# Patient Record
Sex: FEMALE | Race: BLACK OR AFRICAN AMERICAN | Employment: FULL TIME | URBAN - METROPOLITAN AREA
[De-identification: names, ages, dates, MRNs, and addresses within clinical notes are randomized per-mention and may not be internally consistent; named-entity substitution may affect disease eponyms.]

---

## 2017-02-01 ENCOUNTER — HOSPITAL ENCOUNTER (OUTPATIENT)
Dept: LAB | Age: 32
Discharge: HOME | End: 2017-02-01
Payer: MEDICAID

## 2017-02-01 ENCOUNTER — OFFICE VISIT (OUTPATIENT)
Dept: FAMILY MEDICINE CLINIC | Age: 32
End: 2017-02-01

## 2017-02-01 VITALS
DIASTOLIC BLOOD PRESSURE: 75 MMHG | WEIGHT: 189 LBS | TEMPERATURE: 97.9 F | HEIGHT: 67 IN | RESPIRATION RATE: 18 BRPM | OXYGEN SATURATION: 99 % | BODY MASS INDEX: 29.66 KG/M2 | SYSTOLIC BLOOD PRESSURE: 120 MMHG | HEART RATE: 74 BPM

## 2017-02-01 DIAGNOSIS — F41.9 ANXIETY: Primary | ICD-10-CM

## 2017-02-01 DIAGNOSIS — K21.00 GASTROESOPHAGEAL REFLUX DISEASE WITH ESOPHAGITIS: ICD-10-CM

## 2017-02-01 DIAGNOSIS — R09.81 SINUS CONGESTION: ICD-10-CM

## 2017-02-01 DIAGNOSIS — R00.2 PALPITATIONS: ICD-10-CM

## 2017-02-01 DIAGNOSIS — Z72.0 TOBACCO ABUSE: ICD-10-CM

## 2017-02-01 LAB
ALBUMIN SERPL BCP-MCNC: 3.8 G/DL (ref 3.4–5)
ALBUMIN/GLOB SERPL: 1.2 {RATIO} (ref 0.8–1.7)
ALP SERPL-CCNC: 66 U/L (ref 45–117)
ALT SERPL-CCNC: 20 U/L (ref 13–56)
ANION GAP BLD CALC-SCNC: 7 MMOL/L (ref 3–18)
AST SERPL W P-5'-P-CCNC: 12 U/L (ref 15–37)
BASOPHILS # BLD AUTO: 0 K/UL (ref 0–0.06)
BASOPHILS # BLD: 0 % (ref 0–2)
BILIRUB SERPL-MCNC: 0.7 MG/DL (ref 0.2–1)
BUN SERPL-MCNC: 9 MG/DL (ref 7–18)
BUN/CREAT SERPL: 9 (ref 12–20)
CALCIUM SERPL-MCNC: 9.2 MG/DL (ref 8.5–10.1)
CHLORIDE SERPL-SCNC: 106 MMOL/L (ref 100–108)
CO2 SERPL-SCNC: 26 MMOL/L (ref 21–32)
CREAT SERPL-MCNC: 0.98 MG/DL (ref 0.6–1.3)
DIFFERENTIAL METHOD BLD: ABNORMAL
EOSINOPHIL # BLD: 0.1 K/UL (ref 0–0.4)
EOSINOPHIL NFR BLD: 1 % (ref 0–5)
ERYTHROCYTE [DISTWIDTH] IN BLOOD BY AUTOMATED COUNT: 15.3 % (ref 11.6–14.5)
GLOBULIN SER CALC-MCNC: 3.2 G/DL (ref 2–4)
GLUCOSE SERPL-MCNC: 80 MG/DL (ref 74–99)
HCT VFR BLD AUTO: 38.2 % (ref 35–45)
HGB BLD-MCNC: 12.5 G/DL (ref 12–16)
LYMPHOCYTES # BLD AUTO: 35 % (ref 21–52)
LYMPHOCYTES # BLD: 2.2 K/UL (ref 0.9–3.6)
MCH RBC QN AUTO: 29.6 PG (ref 24–34)
MCHC RBC AUTO-ENTMCNC: 32.7 G/DL (ref 31–37)
MCV RBC AUTO: 90.5 FL (ref 74–97)
MONOCYTES # BLD: 0.4 K/UL (ref 0.05–1.2)
MONOCYTES NFR BLD AUTO: 6 % (ref 3–10)
NEUTS SEG # BLD: 3.7 K/UL (ref 1.8–8)
NEUTS SEG NFR BLD AUTO: 58 % (ref 40–73)
PLATELET # BLD AUTO: 214 K/UL (ref 135–420)
PMV BLD AUTO: 12.3 FL (ref 9.2–11.8)
POTASSIUM SERPL-SCNC: 3.9 MMOL/L (ref 3.5–5.5)
PROT SERPL-MCNC: 7 G/DL (ref 6.4–8.2)
RBC # BLD AUTO: 4.22 M/UL (ref 4.2–5.3)
SODIUM SERPL-SCNC: 139 MMOL/L (ref 136–145)
TSH SERPL DL<=0.05 MIU/L-ACNC: 0.72 UIU/ML (ref 0.36–3.74)
WBC # BLD AUTO: 6.4 K/UL (ref 4.6–13.2)

## 2017-02-01 PROCEDURE — 84443 ASSAY THYROID STIM HORMONE: CPT | Performed by: FAMILY MEDICINE

## 2017-02-01 PROCEDURE — 85025 COMPLETE CBC W/AUTO DIFF WBC: CPT | Performed by: FAMILY MEDICINE

## 2017-02-01 PROCEDURE — 80053 COMPREHEN METABOLIC PANEL: CPT | Performed by: FAMILY MEDICINE

## 2017-02-01 RX ORDER — LORATADINE AND PSEUDOEPHEDRINE 10; 240 MG/1; MG/1
1 TABLET, EXTENDED RELEASE ORAL DAILY
Qty: 30 TAB | Refills: 0 | Status: SHIPPED | OUTPATIENT
Start: 2017-02-01 | End: 2017-07-25

## 2017-02-01 RX ORDER — BUPROPION HYDROCHLORIDE 150 MG/1
150 TABLET, EXTENDED RELEASE ORAL 2 TIMES DAILY
Qty: 60 TAB | Refills: 1 | Status: SHIPPED | OUTPATIENT
Start: 2017-02-01 | End: 2017-07-25

## 2017-02-01 NOTE — MR AVS SNAPSHOT
Visit Information Date & Time Provider Department Dept. Phone Encounter #  
 2/1/2017 11:15 AM Hesed Sallye Kocher, MD [facility] [phone] [id number] Follow-up Instructions Return in about 1 month (around 3/1/2017), or if symptoms worsen or fail to improve, for smoking cessation, anxiety, GERD. Upcoming Health Maintenance Date Due Pneumococcal 19-64 Medium Risk (1 of 1 - PPSV23) 3/19/2004 PAP AKA CERVICAL CYTOLOGY 3/19/2006 DTaP/Tdap/Td series (2 - Td) 2/1/2027 Allergies as of 2/1/2017  Review Complete On: 2/1/2017 By: Tee Fierro MD  
 No Known Allergies Current Immunizations  Never Reviewed No immunizations on file. Not reviewed this visit You Were Diagnosed With   
  
 Codes Comments Anxiety    -  Primary ICD-10-CM: F41.9 ICD-9-CM: 300.00 Palpitations     ICD-10-CM: R00.2 ICD-9-CM: 785.1 Tobacco abuse     ICD-10-CM: Z72.0 ICD-9-CM: 305.1 Sinus congestion     ICD-10-CM: R09.81 ICD-9-CM: 478.19 Gastroesophageal reflux disease with esophagitis     ICD-10-CM: K21.0 ICD-9-CM: 530.11 Vitals BP Pulse Temp Resp Height(growth percentile) Weight(growth percentile) 120/75 (BP 1 Location: Left arm, BP Patient Position: Sitting) 74 97.9 °F (36.6 °C) (Oral) 18 5' 7\" (1.702 m) 189 lb (85.7 kg) LMP SpO2 BMI Smoking Status 01/23/2017 99% 29.6 kg/m2 Current Every Day Smoker BMI and BSA Data Body Mass Index Body Surface Area  
 29.6 kg/m 2 2.01 m 2 Preferred Pharmacy Pharmacy Name [address] [place] [phone] Your Updated Medication List  
  
   
This list is accurate as of: 2/1/17 12:28 PM.  Always use your most recent med list.  
  
  
  
  
 buPROPion  mg SR tablet Commonly known as:  Shamika Heller Take 1 Tab by mouth two (2) times a day.  Indications: SMOKING CESSATION  
  
 loratadine-pseudoephedrine  mg per tablet Commonly known as:  CLARITIN-D 24 HOUR Take 1 Tab by mouth daily. Prescriptions Sent to Pharmacy Refills  
 loratadine-pseudoephedrine (CLARITIN-D 24 HOUR)  mg per tablet 0 Sig: Take 1 Tab by mouth daily. Class: Normal  
 Pharmacy: [facility] [place] Ph #: [phone] Route: Oral  
 buPROPion SR (WELLBUTRIN SR) 150 mg SR tablet 1 Sig: Take 1 Tab by mouth two (2) times a day. Indications: SMOKING CESSATION Class: Normal  
 [facility] [facility] [facility]: [phone] Route: Oral  
  
Follow-up Instructions Return in about 1 month (around 3/1/2017), or if symptoms worsen or fail to improve, for smoking cessation, anxiety, GERD. To-Do List   
 02/01/2017 Lab:  CBC WITH AUTOMATED DIFF   
  
 02/01/2017 Lab:  METABOLIC PANEL, COMPREHENSIVE   
  
 02/01/2017 Lab:  TSH 3RD GENERATION Patient Instructions Anxiety Disorder: Care Instructions Your Care Instructions Anxiety is a normal reaction to stress. Difficult situations can cause you to have symptoms such as sweaty palms and a nervous feeling. In an anxiety disorder, the symptoms are far more severe. Constant worry, muscle tension, trouble sleeping, nausea and diarrhea, and other symptoms can make normal daily activities difficult or impossible. These symptoms may occur for no reason, and they can affect your work, school, or social life. Medicines, counseling, and self-care can all help. Follow-up care is a key part of your treatment and safety. Be sure to make and go to all appointments, and call your doctor if you are having problems. It's also a good idea to know your test results and keep a list of the medicines you take. How can you care for yourself at home? · Take medicines exactly as directed.  Call your doctor if you think you are having a problem with your medicine. · Go to your counseling sessions and follow-up appointments. · Recognize and accept your anxiety. Then, when you are in a situation that makes you anxious, say to yourself, \"This is not an emergency. I feel uncomfortable, but I am not in danger. I can keep going even if I feel anxious. \" · Be kind to your body: ¨ Relieve tension with exercise or a massage. ¨ Get enough rest. 
¨ Avoid alcohol, caffeine, nicotine, and illegal drugs. They can increase your anxiety level and cause sleep problems. ¨ Learn and do relaxation techniques. See below for more about these techniques. · Engage your mind. Get out and do something you enjoy. Go to a funny movie, or take a walk or hike. Plan your day. Having too much or too little to do can make you anxious. · Keep a record of your symptoms. Discuss your fears with a good friend or family member, or join a support group for people with similar problems. Talking to others sometimes relieves stress. · Get involved in social groups, or volunteer to help others. Being alone sometimes makes things seem worse than they are. · Get at least 30 minutes of exercise on most days of the week to relieve stress. Walking is a good choice. You also may want to do other activities, such as running, swimming, cycling, or playing tennis or team sports. Relaxation techniques Do relaxation exercises 10 to 20 minutes a day. You can play soothing, relaxing music while you do them, if you wish. · Tell others in your house that you are going to do your relaxation exercises. Ask them not to disturb you. · Find a comfortable place, away from all distractions and noise. · Lie down on your back, or sit with your back straight. · Focus on your breathing. Make it slow and steady. · Breathe in through your nose. Breathe out through either your nose or mouth.  
· Breathe deeply, filling up the area between your navel and your rib cage. Breathe so that your belly goes up and down. · Do not hold your breath. · Breathe like this for 5 to 10 minutes. Notice the feeling of calmness throughout your whole body. As you continue to breathe slowly and deeply, relax by doing the following for another 5 to 10 minutes: · Tighten and relax each muscle group in your body. You can begin at your toes and work your way up to your head. · Imagine your muscle groups relaxing and becoming heavy. · Empty your mind of all thoughts. · Let yourself relax more and more deeply. · Become aware of the state of calmness that surrounds you. · When your relaxation time is over, you can bring yourself back to alertness by moving your fingers and toes and then your hands and feet and then stretching and moving your entire body. Sometimes people fall asleep during relaxation, but they usually wake up shortly afterward. · Always give yourself time to return to full alertness before you drive a car or do anything that might cause an accident if you are not fully alert. Never play a relaxation tape while you drive a car. When should you call for help? Call 911 anytime you think you may need emergency care. For example, call if: 
· You feel you cannot stop from hurting yourself or someone else. Keep the numbers for these national suicide hotlines: 5-285-209-TALK (5-238.941.9499) and 0-430-NOFATCY (5-917.201.2224). If you or someone you know talks about suicide or feeling hopeless, get help right away. Watch closely for changes in your health, and be sure to contact your doctor if: 
· You have anxiety or fear that affects your life. · You have symptoms of anxiety that are new or different from those you had before. Where can you learn more? Go to http://kvng-carla.info/. Enter P754 in the search box to learn more about \"Anxiety Disorder: Care Instructions. \" Current as of: July 26, 2016 Content Version: 11.1 © 7933-2195 [employer]. Care instructions adapted under license by [employer] (which disclaims liability or warranty for this information). If you have questions about a medical condition or this instruction, always ask your healthcare professional. [address] any warranty or liability for your use of this information. Gastroesophageal Reflux Disease (GERD): Care Instructions Your Care Instructions Gastroesophageal reflux disease (GERD) is the backward flow of stomach acid into the esophagus. The esophagus is the tube that leads from your throat to your stomach. A one-way valve prevents the stomach acid from moving up into this tube. When you have GERD, this valve does not close tightly enough. If you have mild GERD symptoms including heartburn, you may be able to control the problem with antacids or over-the-counter medicine. Changing your diet, losing weight, and making other lifestyle changes can also help reduce symptoms. Follow-up care is a key part of your treatment and safety. Be sure to make and go to all appointments, and call your doctor if you are having problems. Its also a good idea to know your test results and keep a list of the medicines you take. How can you care for yourself at home? · Take your medicines exactly as prescribed. Call your doctor if you think you are having a problem with your medicine. · Your doctor may recommend over-the-counter medicine. For mild or occasional indigestion, antacids, such as Tums, Gaviscon, Mylanta, or Maalox, may help. Your doctor also may recommend over-the-counter acid reducers, such as Pepcid AC, Tagamet HB, Zantac 75, or Prilosec. Read and follow all instructions on the label. If you use these medicines often, talk with your doctor. · Change your eating habits. ¨ Its best to eat several small meals instead of two or three large meals. ¨ After you eat, wait 2 to 3 hours before you lie down. ¨ Chocolate, mint, and alcohol can make GERD worse. ¨ Spicy foods, foods that have a lot of acid (like tomatoes and oranges), and coffee can make GERD symptoms worse in some people. If your symptoms are worse after you eat a certain food, you may want to stop eating that food to see if your symptoms get better. · Do not smoke or chew tobacco. Smoking can make GERD worse. If you need help quitting, talk to your doctor about stop-smoking programs and medicines. These can increase your chances of quitting for good. · If you have GERD symptoms at night, raise the head of your bed 6 to 8 inches by putting the frame on blocks or placing a foam wedge under the head of your mattress. (Adding extra pillows does not work.) · Do not wear tight clothing around your middle. · Lose weight if you need to. Losing just 5 to 10 pounds can help. When should you call for help? Call your doctor now or seek immediate medical care if: 
· You have new or different belly pain. · Your stools are black and tarlike or have streaks of blood. Watch closely for changes in your health, and be sure to contact your doctor if: 
· Your symptoms have not improved after 2 days. · Food seems to catch in your throat or chest. 
Where can you learn more? Go to http://kvng-carla.info/. Enter C893 in the search box to learn more about \"Gastroesophageal Reflux Disease (GERD): Care Instructions. \" Current as of: August 9, 2016 Content Version: 11.1 © 4039-8880 [employer]. Care instructions adapted under license by [employer] (which disclaims liability or warranty for this information). If you have questions about a medical condition or this instruction, always ask your healthcare professional. [place] [address] any warranty or liability for your use of this information. Stopping Smokeless Tobacco Use: Care Instructions Your Care Instructions Smokeless tobacco comes in many forms, such as snuff and chewing tobacco: · Snuff is finely ground tobacco sold in cans or pouches. Most of the time, snuff is used by putting a \"pinch\" or \"dip\" between the lower lip or cheek and the gum. · Chewing tobacco is sold as loose leaves, plugs, or twists. It is chewed or placed between the cheek and the gum or teeth. There are plenty of reasons to stop using smokeless tobacco. These products are harmful. They are not risk-free alternatives to smoking. Smokeless tobacco contains nicotine, which is addicting. Though using smokeless tobacco is less harmful than smoking cigarettes, it can cause serious health problems, such as: 
· White patches or red sores in your mouth that can turn into mouth cancer involving the lip, tongue, or cheek. · Tooth loss and other dental problems. · Gum disease. Your gums may pull away from your teeth and not grow back. People who use smokeless tobacco crave the nicotine in it. Giving up smokeless tobacco is much harder than simply changing a habit. Your body has to stop craving the nicotine. It is hard to quit, but you can do it. Many tools are available for people who want to quit using smokeless tobacco. You may find that combining tools works best for you. There are several steps to quitting. First you get ready to quit. Then you get support to help you. After that, you learn new skills and behaviors to quit. For many people, a necessary step is getting and using medicine. Your doctor will help you set up the plan that best meets your needs. You may want to attend a tobacco cessation program. When you choose a program, look for one that has proven success. Ask your doctor for ideas.  You will greatly increase your chances of success if you take medicine as well as get counseling or join a cessation program. 
Some of the changes you feel when you first quit smokeless tobacco are uncomfortable. Your body will miss the nicotine at first, and you may feel short-tempered and grumpy. You may have trouble sleeping or concentrating. Medicine can help you deal with these symptoms. You may struggle with changing your habits and rituals. The last step is the tricky one: Be prepared for the urge to use smokeless tobacco to continue for a time. This is a lot to deal with, but keep at it. You will feel better. Follow-up care is a key part of your treatment and safety. Be sure to make and go to all appointments, and call your doctor if you are having problems. It's also a good idea to know your test results and keep a list of the medicines you take. How can you care for yourself at home? · Ask your family, friends, and coworkers for support. You have a better chance of quitting if you have help and support. · Join a support group for people who are trying to quit using smokeless tobacco. 
· Set a quit date. Pick your date carefully so that it is not right in the middle of a big deadline or stressful time. After you quit, do not use smokeless tobacco even once. Get rid of all spit cups, cans, and pouches after your last use. Clean your house and your clothes so that they do not smell of tobacco. 
· Learn how to be a non-user. Think about ways you can avoid those things that make you reach for tobacco. 
¨ Learn some ways to deal with cravings, like calling a friend or going for a walk. Cravings often pass. ¨ Avoid situations that put you at greatest risk for using smokeless tobacco. For some people, it is hard to spend time with friends without dipping or chewing. For others, they might skip a coffee break with coworkers who smoke or use smokeless tobacco. 
¨ Change your daily routine. Take a different route to work, or eat a meal in a different place. · Cut down on stress. Calm yourself or release tension by doing an activity you enjoy, such as reading a book, taking a hot bath, or gardening. · Talk to your doctor or pharmacist about nicotine replacement therapy. You still get nicotine, but you do not use tobacco. Nicotine replacement products help you slowly reduce the amount of nicotine you need. Many of these products are available over the counter. They include nicotine patches, gum, lozenges, and inhalers. · Ask your doctor about bupropion (Wellbutrin) or varenicline (Chantix), which are prescription medicines. They do not contain nicotine. They help you by reducing withdrawal symptoms, such as stress and anxiety. · Get regular exercise. Having healthy habits will help your body move past its craving for nicotine. · Be prepared to keep trying. Most people are not successful the first few times they try to quit. Do not get mad at yourself if you use tobacco again. Make a list of things you learned, and think about when you want to try again, such as next week, next month, or next year. Where can you learn more? Go to http://kvng-carla.info/. Enter C003 in the search box to learn more about \"Stopping Smokeless Tobacco Use: Care Instructions. \" Current as of: May 26, 2016 Content Version: 11.1 © 7746-9671 [employer]. Care instructions adapted under license by [employer] (which disclaims liability or warranty for this information). If you have questions about a medical condition or this instruction, always ask your healthcare professional. [address] any warranty or liability for your use of this information. Introducing [facility]! [facility] introduces [employer] patient portal. Now you can access parts of your medical record, email your doctor's office, and request medication refills online. 1. In your internet browser, go to [email or web address]€™[email or web address] More [email or web address] Than [email or web address]€™s More Alive Than You 2. Click on the First Time User? Click Here link in the Sign In box. You will see the New Member Sign Up page. 3. Enter your [employer] Access Code exactly as it appears below. You will not need to use this code after youve completed the sign-up process. If you do not sign up before the expiration date, you must request a new code. · [employer] Access Code: [email or web address] Expires: 5/2/2017 11:38 AM 
 
4. Enter the last four digits of your Social Security Number (xxxx) and Date of Birth (mm/dd/yyyy) as indicated and click Submit. You will be taken to the next sign-up page. 5. Create a [employer] ID. This will be your [employer] login ID and cannot be changed, so think of one that is secure and easy to remember. 6. Create a [employer] password. You can change your password at any time. 7. Enter your Password Reset Question and Answer. This can be used at a later time if you forget your password. 8. Enter your e-mail address. You will receive e-mail notification when new information is available in [address]. 9. Click Sign Up. You can now view and download portions of your medical record. 10. Click the Download Summary menu link to download a portable copy of your medical information. If you have questions, please visit the Frequently Asked Questions section of the [employer] website. Remember, [employer] is NOT to be used for urgent needs. For medical emergencies, dial 911. Now available from your iPhone and Android! Please provide this summary of care documentation to your next provider. Your primary care clinician is listed as Arpan Rojo. If you have any questions after today's visit, please call [phone].

## 2017-02-01 NOTE — PATIENT INSTRUCTIONS
Anxiety Disorder: Care Instructions  Your Care Instructions  Anxiety is a normal reaction to stress. Difficult situations can cause you to have symptoms such as sweaty palms and a nervous feeling. In an anxiety disorder, the symptoms are far more severe. Constant worry, muscle tension, trouble sleeping, nausea and diarrhea, and other symptoms can make normal daily activities difficult or impossible. These symptoms may occur for no reason, and they can affect your work, school, or social life. Medicines, counseling, and self-care can all help. Follow-up care is a key part of your treatment and safety. Be sure to make and go to all appointments, and call your doctor if you are having problems. It's also a good idea to know your test results and keep a list of the medicines you take. How can you care for yourself at home? · Take medicines exactly as directed. Call your doctor if you think you are having a problem with your medicine. · Go to your counseling sessions and follow-up appointments. · Recognize and accept your anxiety. Then, when you are in a situation that makes you anxious, say to yourself, \"This is not an emergency. I feel uncomfortable, but I am not in danger. I can keep going even if I feel anxious. \"  · Be kind to your body:  ¨ Relieve tension with exercise or a massage. ¨ Get enough rest.  ¨ Avoid alcohol, caffeine, nicotine, and illegal drugs. They can increase your anxiety level and cause sleep problems. ¨ Learn and do relaxation techniques. See below for more about these techniques. · Engage your mind. Get out and do something you enjoy. Go to a funny movie, or take a walk or hike. Plan your day. Having too much or too little to do can make you anxious. · Keep a record of your symptoms. Discuss your fears with a good friend or family member, or join a support group for people with similar problems. Talking to others sometimes relieves stress.   · Get involved in social groups, or volunteer to help others. Being alone sometimes makes things seem worse than they are. · Get at least 30 minutes of exercise on most days of the week to relieve stress. Walking is a good choice. You also may want to do other activities, such as running, swimming, cycling, or playing tennis or team sports. Relaxation techniques  Do relaxation exercises 10 to 20 minutes a day. You can play soothing, relaxing music while you do them, if you wish. · Tell others in your house that you are going to do your relaxation exercises. Ask them not to disturb you. · Find a comfortable place, away from all distractions and noise. · Lie down on your back, or sit with your back straight. · Focus on your breathing. Make it slow and steady. · Breathe in through your nose. Breathe out through either your nose or mouth. · Breathe deeply, filling up the area between your navel and your rib cage. Breathe so that your belly goes up and down. · Do not hold your breath. · Breathe like this for 5 to 10 minutes. Notice the feeling of calmness throughout your whole body. As you continue to breathe slowly and deeply, relax by doing the following for another 5 to 10 minutes:  · Tighten and relax each muscle group in your body. You can begin at your toes and work your way up to your head. · Imagine your muscle groups relaxing and becoming heavy. · Empty your mind of all thoughts. · Let yourself relax more and more deeply. · Become aware of the state of calmness that surrounds you. · When your relaxation time is over, you can bring yourself back to alertness by moving your fingers and toes and then your hands and feet and then stretching and moving your entire body. Sometimes people fall asleep during relaxation, but they usually wake up shortly afterward. · Always give yourself time to return to full alertness before you drive a car or do anything that might cause an accident if you are not fully alert.  Never play a relaxation tape while you drive a car. When should you call for help? Call 911 anytime you think you may need emergency care. For example, call if:  · You feel you cannot stop from hurting yourself or someone else. Keep the numbers for these national suicide hotlines: 0-715-789-TALK (3-959.277.9968) and 8-728-ZZJQTUR (8-386.846.9185). If you or someone you know talks about suicide or feeling hopeless, get help right away. Watch closely for changes in your health, and be sure to contact your doctor if:  · You have anxiety or fear that affects your life. · You have symptoms of anxiety that are new or different from those you had before. Where can you learn more? Go to http://kvng-carla.info/. Enter P754 in the search box to learn more about \"Anxiety Disorder: Care Instructions. \"  Current as of: July 26, 2016  Content Version: 11.1  © 9137-9453 [employer]. Care instructions adapted under license by [employer] (which disclaims liability or warranty for this information). If you have questions about a medical condition or this instruction, always ask your healthcare professional. [place] [address] any warranty or liability for your use of this information. Gastroesophageal Reflux Disease (GERD): Care Instructions  Your Care Instructions    Gastroesophageal reflux disease (GERD) is the backward flow of stomach acid into the esophagus. The esophagus is the tube that leads from your throat to your stomach. A one-way valve prevents the stomach acid from moving up into this tube. When you have GERD, this valve does not close tightly enough. If you have mild GERD symptoms including heartburn, you may be able to control the problem with antacids or over-the-counter medicine. Changing your diet, losing weight, and making other lifestyle changes can also help reduce symptoms. Follow-up care is a key part of your treatment and safety.  Be sure to make and go to all appointments, and call your doctor if you are having problems. Its also a good idea to know your test results and keep a list of the medicines you take. How can you care for yourself at home? · Take your medicines exactly as prescribed. Call your doctor if you think you are having a problem with your medicine. · Your doctor may recommend over-the-counter medicine. For mild or occasional indigestion, antacids, such as Tums, Gaviscon, Mylanta, or Maalox, may help. Your doctor also may recommend over-the-counter acid reducers, such as Pepcid AC, Tagamet HB, Zantac 75, or Prilosec. Read and follow all instructions on the label. If you use these medicines often, talk with your doctor. · Change your eating habits. ¨ Its best to eat several small meals instead of two or three large meals. ¨ After you eat, wait 2 to 3 hours before you lie down. ¨ Chocolate, mint, and alcohol can make GERD worse. ¨ Spicy foods, foods that have a lot of acid (like tomatoes and oranges), and coffee can make GERD symptoms worse in some people. If your symptoms are worse after you eat a certain food, you may want to stop eating that food to see if your symptoms get better. · Do not smoke or chew tobacco. Smoking can make GERD worse. If you need help quitting, talk to your doctor about stop-smoking programs and medicines. These can increase your chances of quitting for good. · If you have GERD symptoms at night, raise the head of your bed 6 to 8 inches by putting the frame on blocks or placing a foam wedge under the head of your mattress. (Adding extra pillows does not work.)  · Do not wear tight clothing around your middle. · Lose weight if you need to. Losing just 5 to 10 pounds can help. When should you call for help? Call your doctor now or seek immediate medical care if:  · You have new or different belly pain. · Your stools are black and tarlike or have streaks of blood.   Watch closely for changes in your health, and be sure to contact your doctor if:  · Your symptoms have not improved after 2 days. · Food seems to catch in your throat or chest.  Where can you learn more? Go to http://kvng-carla.info/. Enter K277 in the search box to learn more about \"Gastroesophageal Reflux Disease (GERD): Care Instructions. \"  Current as of: August 9, 2016  Content Version: 11.1  © 8716-6177 [employer]. Care instructions adapted under license by [employer] (which disclaims liability or warranty for this information). If you have questions about a medical condition or this instruction, always ask your healthcare professional. [place] [address] any warranty or liability for your use of this information. Stopping Smokeless Tobacco Use: Care Instructions  Your Care Instructions  Smokeless tobacco comes in many forms, such as snuff and chewing tobacco:  · Snuff is finely ground tobacco sold in cans or pouches. Most of the time, snuff is used by putting a \"pinch\" or \"dip\" between the lower lip or cheek and the gum. · Chewing tobacco is sold as loose leaves, plugs, or twists. It is chewed or placed between the cheek and the gum or teeth. There are plenty of reasons to stop using smokeless tobacco. These products are harmful. They are not risk-free alternatives to smoking. Smokeless tobacco contains nicotine, which is addicting. Though using smokeless tobacco is less harmful than smoking cigarettes, it can cause serious health problems, such as:  · White patches or red sores in your mouth that can turn into mouth cancer involving the lip, tongue, or cheek. · Tooth loss and other dental problems. · Gum disease. Your gums may pull away from your teeth and not grow back. People who use smokeless tobacco crave the nicotine in it. Giving up smokeless tobacco is much harder than simply changing a habit. Your body has to stop craving the nicotine. It is hard to quit, but you can do it. Many tools are available for people who want to quit using smokeless tobacco. You may find that combining tools works best for you. There are several steps to quitting. First you get ready to quit. Then you get support to help you. After that, you learn new skills and behaviors to quit. For many people, a necessary step is getting and using medicine. Your doctor will help you set up the plan that best meets your needs. You may want to attend a tobacco cessation program. When you choose a program, look for one that has proven success. Ask your doctor for ideas. You will greatly increase your chances of success if you take medicine as well as get counseling or join a cessation program.  Some of the changes you feel when you first quit smokeless tobacco are uncomfortable. Your body will miss the nicotine at first, and you may feel short-tempered and grumpy. You may have trouble sleeping or concentrating. Medicine can help you deal with these symptoms. You may struggle with changing your habits and rituals. The last step is the tricky one: Be prepared for the urge to use smokeless tobacco to continue for a time. This is a lot to deal with, but keep at it. You will feel better. Follow-up care is a key part of your treatment and safety. Be sure to make and go to all appointments, and call your doctor if you are having problems. It's also a good idea to know your test results and keep a list of the medicines you take. How can you care for yourself at home? · Ask your family, friends, and coworkers for support. You have a better chance of quitting if you have help and support. · Join a support group for people who are trying to quit using smokeless tobacco.  · Set a quit date. Pick your date carefully so that it is not right in the middle of a big deadline or stressful time. After you quit, do not use smokeless tobacco even once. Get rid of all spit cups, cans, and pouches after your last use.  Clean your house and your clothes so that they do not smell of tobacco.  · Learn how to be a non-user. Think about ways you can avoid those things that make you reach for tobacco.  ¨ Learn some ways to deal with cravings, like calling a friend or going for a walk. Cravings often pass. ¨ Avoid situations that put you at greatest risk for using smokeless tobacco. For some people, it is hard to spend time with friends without dipping or chewing. For others, they might skip a coffee break with coworkers who smoke or use smokeless tobacco.  ¨ Change your daily routine. Take a different route to work, or eat a meal in a different place. · Cut down on stress. Calm yourself or release tension by doing an activity you enjoy, such as reading a book, taking a hot bath, or gardening. · Talk to your doctor or pharmacist about nicotine replacement therapy. You still get nicotine, but you do not use tobacco. Nicotine replacement products help you slowly reduce the amount of nicotine you need. Many of these products are available over the counter. They include nicotine patches, gum, lozenges, and inhalers. · Ask your doctor about bupropion (Wellbutrin) or varenicline (Chantix), which are prescription medicines. They do not contain nicotine. They help you by reducing withdrawal symptoms, such as stress and anxiety. · Get regular exercise. Having healthy habits will help your body move past its craving for nicotine. · Be prepared to keep trying. Most people are not successful the first few times they try to quit. Do not get mad at yourself if you use tobacco again. Make a list of things you learned, and think about when you want to try again, such as next week, next month, or next year. Where can you learn more? Go to http://kvng-carla.info/. Enter Z882 in the search box to learn more about \"Stopping Smokeless Tobacco Use: Care Instructions. \"  Current as of: May 26, 2016  Content Version: 11.1  © 2567-2256 [employer]. Care instructions adapted under license by [employer] (which disclaims liability or warranty for this information). If you have questions about a medical condition or this instruction, always ask your healthcare professional. [address] any warranty or liability for your use of this information.

## 2017-02-01 NOTE — PROGRESS NOTES
Chief Complaint   Patient presents with    Establish Care    Anxiety    Abdominal Pain     Patient in to establish care. She has concerns with anxiety symptoms. She states that her heart is racing sometimes. She also reports changes in her appetite and sleeping habits. She was seen at [facility] ED on 1/1/2017 for RUQ pain. 1. Have you been to the ER, urgent care clinic since your last visit? Hospitalized since your last visit? Yes, 1/1/2017 [facility] ED for Abdominal pain. 2. Have you seen or consulted any other health care providers outside of the [address] [place] [address] since your last visit? Include any pap smears or colon screening. No     HPI  Jeevan Kraft comes in to establish care. 1) GERD: Patient has epigastric pain with retrosternal discomfort. This feels like heartburn. She was seen previously in the ED and placed on famotidine. She is yet to take the medication. Denies nausea, vomiting or hematemesis. Symptoms worse with certain foods including fatty and spicy foods. This is accompanied with nausea but no vomiting. I discussed dyspepsia and GERD. Will have her take the famotidine and f/u i 1 month or sooner as needed. 2) Anxiety: patient does have episodes of anxiety. These come on and off. She worries about anything and everything. She does get palpitations at these times. No chest pain or syncope. She also does at times feel depressed. 3) Tobacco abuse: patient smoker, 1 ppd for past 15 years. She is willing to quit smoking. Will give wellbutrin. This should also help with anxiety. F/u in 1 month. 4) Sinus congestion: patient has sinus congestion and pressure. ths affects the frontal and maxillary sinuses. Feels these are plugged up. She has post nasal drainage that is clear. No fever or chills. will give claritin D for now and f/u at next visit.         Past Medical History  Past Medical History   Diagnosis Date    Gastritis        Surgical History  Past Surgical History Procedure Laterality Date    Hx breast reduction      Hx tubal ligation          Medications      Allergies  No Known Allergies    Family History  No family history on file. Social History  Social History     Social History    Marital status: SINGLE     Spouse name: N/A    Number of children: N/A    Years of education: N/A     Occupational History    Unemployed      Social History Main Topics    Smoking status: Current Every Day Smoker     Types: Cigarettes    Smokeless tobacco: Not on file    Alcohol use Yes      Comment:  Occ    Drug use: No    Sexual activity: Yes     Other Topics Concern     Service No    Blood Transfusions No    Caffeine Concern No    Occupational Exposure No    Hobby Hazards No    Sleep Concern No    Stress Concern No    Weight Concern No    Special Diet No    Back Care No    Exercise No    Bike Helmet No    Seat Belt Yes    Self-Exams Yes     Social History Narrative    No narrative on file       Review of Systems  Review of Systems - History obtained from the patient  General ROS: positive for  - fatigue  negative for - chills or fever  Psychological ROS: positive for - anxiety, concentration difficulties, depression and mood swings  Ophthalmic ROS: negative  ENT ROS: positive for - headaches, nasal congestion, nasal discharge, sinus pain, sneezing and tinnitus  negative for - epistaxis, hearing change or sore throat  Allergy and Immunology ROS: positive for - nasal congestion and postnasal drip  Hematological and Lymphatic ROS: negative  Endocrine ROS: positive for - malaise/lethargy, mood swings and palpitations  Respiratory ROS: no cough, shortness of breath, or wheezing  Cardiovascular ROS: no chest pain or dyspnea on exertion, has palpitations on an doff  Gastrointestinal ROS: no abdominal pain, change in bowel habits, or black or bloody stools, has heartburn   Genito-Urinary ROS: no dysuria, trouble voiding, or hematuria  Musculoskeletal ROS: negative  Neurological ROS: negative    Vital Signs  Visit Vitals    /75 (BP 1 Location: Left arm, BP Patient Position: Sitting)    Pulse 74    Temp 97.9 °F (36.6 °C) (Oral)    Resp 18    Ht 5' 7\" (1.702 m)    Wt 189 lb (85.7 kg)    LMP 01/23/2017    SpO2 99%    BMI 29.6 kg/m2         Physical Exam  Physical Examination: General appearance - oriented to person, place, and time, acyanotic, in no respiratory distress, well hydrated and anxious  Mental status - alert, oriented to person, place, and time, affect appropriate to mood  Eyes - sclera anicteric  Ears - bilateral TM's and external ear canals normal, hearing grossly normal bilaterally  Nose - erythema, mucosal congestion  Mouth - mucous membranes moist, pharynx normal without lesions  Neck - supple, no significant adenopathy  Lymphatics - no palpable lymphadenopathy, no hepatosplenomegaly  Chest - clear to auscultation, no wheezes, rales or rhonchi, symmetric air entry, no tachypnea, retractions or cyanosis  Heart - normal rate, regular rhythm, normal S1, S2, no murmurs, rubs, clicks or gallops  Abdomen - soft, nontender, nondistended, no masses or organomegaly  bowel sounds normal  Back exam - full range of motion, no tenderness, palpable spasm or pain on motion  Neurological - alert, oriented, normal speech, no focal findings or movement disorder noted  Musculoskeletal - no joint tenderness, deformity or swelling  Extremities - peripheral pulses normal, no pedal edema, no clubbing or cyanosis    Diagnostics  No orders of the defined types were placed in this encounter.         Results  Results for orders placed or performed during the hospital encounter of 01/19/11   CBC WITH AUTOMATED DIFF   Result Value Ref Range    WBC 9.1 4.5 - 13.0 K/uL    RBC 4.24 4.10 - 5.10 M/uL    HGB 13.4 12.0 - 16.0 g/dL    HCT 40.4 36.0 - 46.0 %    MCV 95.3 78.0 - 102.0 FL    MCH 31.5 25.0 - 35.0 PG    MCHC 33.0 31.0 - 37.0 g/dL    RDW 14.4 11.5 - 14.5 %    PLATELET 168 130 - 400 K/uL    MPV 11.2 (H) 7.4 - 10.4 FL    NEUTROPHILS 68 42 - 75 %    LYMPHOCYTES 26 20 - 51 %    MONOCYTES 4 2 - 9 %    EOSINOPHILS 1 0 - 5 %    BASOPHILS 1 0 - 3 %    ABS. NEUTROPHILS 6.2 1.8 - 8.0 K/UL    ABS. LYMPHOCYTES 2.4 0.8 - 3.5 K/UL    ABS. MONOCYTES 0.4 0 - 1.0 K/UL    ABS. EOSINOPHILS 0.1 0.0 - 0.4 K/UL    ABS. BASOPHILS 0.0 0.0 - 0.1 K/UL    DF AUTOMATED      ASSESSMENT and PLAN    ICD-10-CM ICD-9-CM    1. Anxiety F41.9 300.00 buPROPion SR (WELLBUTRIN SR) 150 mg SR tablet      TSH 3RD GENERATION   2. Palpitations R00.2 785.1 TSH 3RD GENERATION      METABOLIC PANEL, COMPREHENSIVE      CBC WITH AUTOMATED DIFF   3. Tobacco abuse Z72.0 305.1 buPROPion SR (WELLBUTRIN SR) 150 mg SR tablet   4. Sinus congestion R09.81 478.19 loratadine-pseudoephedrine (CLARITIN-D 24 HOUR)  mg per tablet   5. Gastroesophageal reflux disease with esophagitis K21.0 530.11      lab results and schedule of future lab studies reviewed with patient  reviewed diet, exercise and weight control  reviewed medications and side effects in detail      I have discussed the diagnosis with the patient and the intended plan of care as seen in the above orders. The patient has received an after-visit summary and questions were answered concerning future plans. I have discussed medication, side effects, and warnings with the patient in detail. The patient verbalized understanding and is in agreement with the plan of care. The patient will contact the office with any additional concerns.     Randolph Pierre MD

## 2017-03-29 ENCOUNTER — OFFICE VISIT (OUTPATIENT)
Dept: FAMILY MEDICINE CLINIC | Age: 32
End: 2017-03-29

## 2017-03-29 VITALS
HEIGHT: 67 IN | WEIGHT: 189 LBS | DIASTOLIC BLOOD PRESSURE: 75 MMHG | HEART RATE: 84 BPM | TEMPERATURE: 98 F | SYSTOLIC BLOOD PRESSURE: 115 MMHG | BODY MASS INDEX: 29.66 KG/M2 | RESPIRATION RATE: 18 BRPM

## 2017-03-29 DIAGNOSIS — Z12.4 ENCOUNTER FOR SCREENING FOR CERVICAL CANCER: ICD-10-CM

## 2017-03-29 DIAGNOSIS — Z01.419 WELL WOMAN EXAM: Primary | ICD-10-CM

## 2017-03-29 DIAGNOSIS — Z72.0 TOBACCO ABUSE: ICD-10-CM

## 2017-03-29 DIAGNOSIS — N64.4 MASTALGIA: ICD-10-CM

## 2017-03-29 NOTE — MR AVS SNAPSHOT
Visit Information Date & Time Provider Department Dept. Phone Encounter #  
 3/29/2017 11:15 AM Arpan Sanderson MD [facility] [phone] [id number] Follow-up Instructions Return in about 3 months (around 6/29/2017), or if symptoms worsen or fail to improve, for smoking cessation. Upcoming Health Maintenance Date Due  
 PAP AKA CERVICAL CYTOLOGY 3/19/2006 DTaP/Tdap/Td series (2 - Td) 2/1/2027 Allergies as of 3/29/2017  Review Complete On: 3/29/2017 By: Mauricio Gorman MD  
 No Known Allergies Current Immunizations  Never Reviewed No immunizations on file. Not reviewed this visit You Were Diagnosed With   
  
 Codes Comments Encounter for screening for cervical cancer     -  Primary ICD-10-CM: Z12.4 ICD-9-CM: V76.2 Mastalgia     ICD-10-CM: N64.4 ICD-9-CM: 611.71 Vitals BP Pulse Temp Resp Height(growth percentile) Weight(growth percentile) 115/75 (BP 1 Location: Right arm, BP Patient Position: Sitting) 84 98 °F (36.7 °C) (Oral) 18 5' 7\" (1.702 m) 189 lb (85.7 kg) LMP BMI OB Status Smoking Status 03/20/2017 29.6 kg/m2 Having regular periods Current Every Day Smoker BMI and BSA Data Body Mass Index Body Surface Area  
 29.6 kg/m 2 2.01 m 2 Preferred Pharmacy Pharmacy Name Phone [facility] [address] [facility] [facility] [phone] Your Updated Medication List  
  
   
This list is accurate as of: 3/29/17 12:09 PM.  Always use your most recent med list.  
  
  
  
  
 buPROPion  mg SR tablet Commonly known as:  Alysia Martin Take 1 Tab by mouth two (2) times a day. Indications: SMOKING CESSATION  
  
 loratadine-pseudoephedrine  mg per tablet Commonly known as:  CLARITIN-D 24 HOUR Take 1 Tab by mouth daily. Follow-up Instructions  Return in about 3 months (around 6/29/2017), or if symptoms worsen or fail to improve, for smoking cessation. To-Do List   
 03/29/2017 Imaging:  NEW MAMMO BI SCREENING INCL CAD   
  
 03/29/2017 Pathology:  PAP IG, CT-NG-TV, APTIMA HPV AND [address] 36/74,50([id number]) Patient Instructions Breast Pain: Care Instructions Your Care Instructions Breast tenderness and pain may come and go with your monthly periods (cyclic), or it may not follow any pattern (noncyclic). Breast pain is rarely caused by a serious health problem. You may need tests to find the cause. Follow-up care is a key part of your treatment and safety. Be sure to make and go to all appointments, and call your doctor if you are having problems. Its also a good idea to know your test results and keep a list of the medicines you take. How can you care for yourself at home? · If your doctor gave you medicine, take it exactly as prescribed. Call your doctor if you think you are having a problem with your medicine. · Take an over-the-counter pain medicine, such as acetaminophen (Tylenol), ibuprofen (Advil, Motrin), or naproxen (Aleve), to relieve pain and swelling. Read and follow all instructions on the label. · Do not take two or more pain medicines at the same time unless the doctor told you to. Many pain medicines have acetaminophen, which is Tylenol. Too much acetaminophen (Tylenol) can be harmful. · Wear a supportive bra, such as a sports bra or a jog bra. · Cut down on the amount of fat in your diet. If you need help planning healthy meals, see a dietitian. · Get at least 30 minutes of exercise on most days of the week. Walking is a good choice. You also may want to do other activities, such as running, swimming, cycling, or playing tennis or team sports. · Keep a healthy sleep pattern. Go to bed at the same time every night, and get up at the same time every day. When should you call for help? Call your doctor now or seek immediate medical care if: · You have new changes in a breast, such as: ¨ A lump or thickening in your breast or armpit. ¨ A change in the breast's size or shape. ¨ Skin changes, such as dimples or puckers. ¨ Nipple discharge. ¨ A change in the color or feel of the skin of your breast or the darker area around the nipple (areola). ¨ A change in the shape of the nipple (it may look like it's being pulled into the breast). · You have symptoms of a breast infection, such as: 
¨ Increased pain, swelling, redness, or warmth around a breast. 
¨ Red streaks extending from the breast. 
¨ Pus draining from a breast. 
¨ A fever. Watch closely for changes in your health, and be sure to contact your doctor if: 
· Your breast pain does not get better after 1 week. · You have a lump or thickening in your breast or armpit. Where can you learn more? Go to http://kvng-carla.info/. Enter S226 in the search box to learn more about \"Breast Pain: Care Instructions. \" Current as of: May 27, 2016 Content Version: 11.2 © 4176-6470 [employer]. Care instructions adapted under license by [employer] (which disclaims liability or warranty for this information). If you have questions about a medical condition or this instruction, always ask your healthcare professional. [address] any warranty or liability for your use of this information. Pap Test: Care Instructions Your Care Instructions The Pap test (also called a Pap smear) is a screening test for cancer of the cervix, which is the lower part of the uterus that opens into the vagina. The test can help your doctor find early changes in the cells that could lead to cancer. The sample of cells taken during your test has been sent to a lab so that an expert can look at the cells. It usually takes a week or two to get the results back. Follow-up care is a key part of your treatment and safety.  Be sure to make and go to all appointments, and call your doctor if you are having problems. It's also a good idea to know your test results and keep a list of the medicines you take. What do the results mean? · A normal result means that the test did not find any abnormal cells in the sample. · An abnormal result can mean many things. Most of these are not cancer. The results of your test may be abnormal because: 
¨ You have an infection of the vagina or cervix, such as a yeast infection. ¨ You have an IUD (intrauterine device for birth control). ¨ You have low estrogen levels after menopause that are causing the cells to change. ¨ You have cell changes that may be a sign of precancer or cancer. The results are ranked based on how serious the changes might be. There are many other reasons why you might not get a normal result. If the results were abnormal, you may need to get another test within a few weeks or months. If the results show changes that could be a sign of cancer, you may need a test called a colposcopy, which provides a more complete view of the cervix. Sometimes the lab cannot use the sample because it does not contain enough cells or was not preserved well. If so, you may need to have the test again. This is not common, but it does happen from time to time. When should you call for help? Watch closely for changes in your health, and be sure to contact your doctor if: 
· You have vaginal bleeding or pain for more than 2 days after the test. It is normal to have a small amount of bleeding for a day or two after the test. 
Where can you learn more? Go to http://kvng-carla.info/. Enter I634 in the search box to learn more about \"Pap Test: Care Instructions. \" Current as of: July 26, 2016 Content Version: 11.2 © 0684-9595 [email or web address].[employer].  Care instructions adapted under license by [employer] (which disclaims liability or warranty for this information). If you have questions about a medical condition or this instruction, always ask your healthcare professional. [address] any warranty or liability for your use of this information. Introducing [facility]! Munir Bishop introduces [employer] patient portal. Now you can access parts of your medical record, email your doctor's office, and request medication refills online. 1. In your internet browser, go to [email or web address] 2. Click on the First Time User? Click Here link in the Sign In box. You will see the New Member Sign Up page. 3. Enter your [employer] Access Code exactly as it appears below. You will not need to use this code after youve completed the sign-up process. If you do not sign up before the expiration date, you must request a new code. · [employer] Access Code: [email or web address] Expires: 5/2/2017 12:38 PM 
 
4. Enter the last four digits of your Social Security Number (xxxx) and Date of Birth (mm/dd/yyyy) as indicated and click Submit. You will be taken to the next sign-up page. 5. Create a [employer] ID. This will be your [employer] login ID and cannot be changed, so think of one that is secure and easy to remember. 6. Create a [employer] password. You can change your password at any time. 7. Enter your Password Reset Question and Answer. This can be used at a later time if you forget your password. 8. Enter your e-mail address. You will receive e-mail notification when new information is available in [address] 9. Click Sign Up. You can now view and download portions of your medical record. 10. Click the Download Summary menu link to download a portable copy of your medical information. If you have questions, please visit the Frequently Asked Questions section of the [employer] website. Remember, [employer] is NOT to be used for urgent needs. For medical emergencies, dial 911. Now available from your iPhone and Android! Please provide this summary of care documentation to your next provider. Your primary care clinician is listed as Arpan Rojo. If you have any questions after today's visit, please call [phone].

## 2017-03-29 NOTE — PATIENT INSTRUCTIONS
Breast Pain: Care Instructions  Your Care Instructions  Breast tenderness and pain may come and go with your monthly periods (cyclic), or it may not follow any pattern (noncyclic). Breast pain is rarely caused by a serious health problem. You may need tests to find the cause. Follow-up care is a key part of your treatment and safety. Be sure to make and go to all appointments, and call your doctor if you are having problems. Its also a good idea to know your test results and keep a list of the medicines you take. How can you care for yourself at home? · If your doctor gave you medicine, take it exactly as prescribed. Call your doctor if you think you are having a problem with your medicine. · Take an over-the-counter pain medicine, such as acetaminophen (Tylenol), ibuprofen (Advil, Motrin), or naproxen (Aleve), to relieve pain and swelling. Read and follow all instructions on the label. · Do not take two or more pain medicines at the same time unless the doctor told you to. Many pain medicines have acetaminophen, which is Tylenol. Too much acetaminophen (Tylenol) can be harmful. · Wear a supportive bra, such as a sports bra or a jog bra. · Cut down on the amount of fat in your diet. If you need help planning healthy meals, see a dietitian. · Get at least 30 minutes of exercise on most days of the week. Walking is a good choice. You also may want to do other activities, such as running, swimming, cycling, or playing tennis or team sports. · Keep a healthy sleep pattern. Go to bed at the same time every night, and get up at the same time every day. When should you call for help? Call your doctor now or seek immediate medical care if:  · You have new changes in a breast, such as:  ¨ A lump or thickening in your breast or armpit. ¨ A change in the breast's size or shape. ¨ Skin changes, such as dimples or puckers. ¨ Nipple discharge.   ¨ A change in the color or feel of the skin of your breast or the darker area around the nipple (areola). ¨ A change in the shape of the nipple (it may look like it's being pulled into the breast). · You have symptoms of a breast infection, such as:  ¨ Increased pain, swelling, redness, or warmth around a breast.  ¨ Red streaks extending from the breast.  ¨ Pus draining from a breast.  ¨ A fever. Watch closely for changes in your health, and be sure to contact your doctor if:  · Your breast pain does not get better after 1 week. · You have a lump or thickening in your breast or armpit. Where can you learn more? Go to http://kvng-carla.info/. Enter S930 in the search box to learn more about \"Breast Pain: Care Instructions. \"  Current as of: May 27, 2016  Content Version: 11.2  © 9321-6731 [employer]. Care instructions adapted under license by [employer] (which disclaims liability or warranty for this information). If you have questions about a medical condition or this instruction, always ask your healthcare professional. [place] [address] any warranty or liability for your use of this information. Pap Test: Care Instructions  Your Care Instructions  The Pap test (also called a Pap smear) is a screening test for cancer of the cervix, which is the lower part of the uterus that opens into the vagina. The test can help your doctor find early changes in the cells that could lead to cancer. The sample of cells taken during your test has been sent to a lab so that an expert can look at the cells. It usually takes a week or two to get the results back. Follow-up care is a key part of your treatment and safety. Be sure to make and go to all appointments, and call your doctor if you are having problems. It's also a good idea to know your test results and keep a list of the medicines you take. What do the results mean? · A normal result means that the test did not find any abnormal cells in the sample.   · An abnormal result can mean many things. Most of these are not cancer. The results of your test may be abnormal because:  ¨ You have an infection of the vagina or cervix, such as a yeast infection. ¨ You have an IUD (intrauterine device for birth control). ¨ You have low estrogen levels after menopause that are causing the cells to change. ¨ You have cell changes that may be a sign of precancer or cancer. The results are ranked based on how serious the changes might be. There are many other reasons why you might not get a normal result. If the results were abnormal, you may need to get another test within a few weeks or months. If the results show changes that could be a sign of cancer, you may need a test called a colposcopy, which provides a more complete view of the cervix. Sometimes the lab cannot use the sample because it does not contain enough cells or was not preserved well. If so, you may need to have the test again. This is not common, but it does happen from time to time. When should you call for help? Watch closely for changes in your health, and be sure to contact your doctor if:  · You have vaginal bleeding or pain for more than 2 days after the test. It is normal to have a small amount of bleeding for a day or two after the test.  Where can you learn more? Go to http://kvng-carla.info/. Enter D638 in the search box to learn more about \"Pap Test: Care Instructions. \"  Current as of: July 26, 2016  Content Version: 11.2  © 4278-6908 [employer]. Care instructions adapted under license by [employer] (which disclaims liability or warranty for this information). If you have questions about a medical condition or this instruction, always ask your healthcare professional. [place] [address] any warranty or liability for your use of this information.

## 2017-03-29 NOTE — PROGRESS NOTES
Chief Complaint   Patient presents with    Well Woman     1. Have you been to the ER, urgent care clinic since your last visit? Hospitalized since your last visit? No    2. Have you seen or consulted any other health care providers outside of the [address] [place] [address] since your last visit? Include any pap smears or colon screening. No     HPI  Emilie Collazo comes in for Well woman check. 1) patient would like to have pap smear done. 2) Patient would like to have breast exam done. She has a family h/o breast cancer. She has had reduction mammoplasty. 2 years ago did have breast cysts and had to have breast us done. He has pain bilateral breasts more so on the left. She is at the end of her menstrual period. No nipple discharge. No redness or erythema. Will send her for mammogram.  3) Tobacco abuse: she is yet to start using the wellbutrin. She will try it and see how it does for her. Past Medical History  Past Medical History:   Diagnosis Date    Gastritis        Surgical History  Past Surgical History:   Procedure Laterality Date    HX BREAST REDUCTION      HX TUBAL LIGATION          Medications  Current Outpatient Prescriptions   Medication Sig Dispense Refill    loratadine-pseudoephedrine (CLARITIN-D 24 HOUR)  mg per tablet Take 1 Tab by mouth daily. 30 Tab 0    buPROPion SR (WELLBUTRIN SR) 150 mg SR tablet Take 1 Tab by mouth two (2) times a day. Indications: SMOKING CESSATION 60 Tab 1       Allergies  No Known Allergies    Family History  No family history on file. Social History  Social History     Social History    Marital status: SINGLE     Spouse name: N/A    Number of children: N/A    Years of education: N/A     Occupational History    Unemployed      Social History Main Topics    Smoking status: Current Every Day Smoker     Types: Cigarettes    Smokeless tobacco: Not on file    Alcohol use Yes      Comment:  Occ    Drug use: No    Sexual activity: Yes     Other Topics Concern     Service No    Blood Transfusions No    Caffeine Concern No    Occupational Exposure No    Hobby Hazards No    Sleep Concern No    Stress Concern No    Weight Concern No    Special Diet No    Back Care No    Exercise No    Bike Helmet No    Seat Belt Yes    Self-Exams Yes     Social History Narrative       Review of Systems  Review of Systems - History obtained from patient and chart review  General ROS: negative for - chills, fatigue, fever or malaise  Psychological ROS: negative  Ophthalmic ROS: negative for - blurry vision or decreased vision  ENT ROS: negative  Allergy and Immunology ROS: negative  Hematological and Lymphatic ROS: negative  Endocrine ROS: positive for - breast changes, negative for fever, chills  Breast ROS: negative for breast lumps  Respiratory ROS: no cough, shortness of breath, or wheezing  Cardiovascular ROS: no chest pain or dyspnea on exertion  Gastrointestinal ROS: no abdominal pain, change in bowel habits, or black or bloody stools  Genito-Urinary ROS: no dysuria, trouble voiding, or hematuria  Musculoskeletal ROS: negative  Neurological ROS: negative  Dermatological ROS: negative    Vital Signs  Visit Vitals    /75 (BP 1 Location: Right arm, BP Patient Position: Sitting)    Pulse 84    Temp 98 °F (36.7 °C) (Oral)    Resp 18    Ht 5' 7\" (1.702 m)    Wt 189 lb (85.7 kg)    LMP 03/20/2017    BMI 29.6 kg/m2         Physical Exam  Physical Examination: General appearance - alert, well appearing, and in no distress, oriented to person, place, and time, acyanotic, in no respiratory distress and well hydrated  Mental status - alert, oriented to person, place, and time, normal mood, behavior, speech, dress, motor activity, and thought processes  Eyes - sclera anicteric  Mouth - mucous membranes moist, pharynx normal without lesions  Neck - supple, no significant adenopathy  Chest - clear to auscultation, no wheezes, rales or rhonchi, symmetric air entry, no tachypnea, retractions or cyanosis  Heart - normal rate and regular rhythm, S1 and S2 normal  Breasts - breasts appear normal, no suspicious masses, no skin or nipple changes or axillary nodes, surgical scars noted inferior aspect both breasts  Pelvic - normal external genitalia, vulva, vagina, cervix, uterus and adnexa, PAP: Pap smear done today, exam chaperoned by Katarina Werner LPN. Back exam - full range of motion, no tenderness, palpable spasm or pain on motion  Neurological - alert, oriented, normal speech, no focal findings or movement disorder noted, motor and sensory grossly normal bilaterally  Musculoskeletal - no joint tenderness, deformity or swelling  Extremities - peripheral pulses normal, no pedal edema, no clubbing or cyanosis    Diagnostics  No orders of the defined types were placed in this encounter. Results  Results for orders placed or performed during the hospital encounter of 46/65/06   METABOLIC PANEL, COMPREHENSIVE   Result Value Ref Range    Sodium 139 136 - 145 mmol/L    Potassium 3.9 3.5 - 5.5 mmol/L    Chloride 106 100 - 108 mmol/L    CO2 26 21 - 32 mmol/L    Anion gap 7 3.0 - 18 mmol/L    Glucose 80 74 - 99 mg/dL    BUN 9 7.0 - 18 MG/DL    Creatinine 0.98 0.6 - 1.3 MG/DL    BUN/Creatinine ratio 9 (L) 12 - 20      GFR est AA >60 >60 ml/min/1.73m2    GFR est non-AA >60 >60 ml/min/1.73m2    Calcium 9.2 8.5 - 10.1 MG/DL    Bilirubin, total 0.7 0.2 - 1.0 MG/DL    ALT (SGPT) 20 13 - 56 U/L    AST (SGOT) 12 (L) 15 - 37 U/L    Alk.  phosphatase 66 45 - 117 U/L    Protein, total 7.0 6.4 - 8.2 g/dL    Albumin 3.8 3.4 - 5.0 g/dL    Globulin 3.2 2.0 - 4.0 g/dL    A-G Ratio 1.2 0.8 - 1.7     CBC WITH AUTOMATED DIFF   Result Value Ref Range    WBC 6.4 4.6 - 13.2 K/uL    RBC 4.22 4.20 - 5.30 M/uL    HGB 12.5 12.0 - 16.0 g/dL    HCT 38.2 35.0 - 45.0 %    MCV 90.5 74.0 - 97.0 FL    MCH 29.6 24.0 - 34.0 PG    MCHC 32.7 31.0 - 37.0 g/dL    RDW 15.3 (H) 11.6 - 14.5 %    PLATELET 363 504 - 420 K/uL    MPV 12.3 (H) 9.2 - 11.8 FL    NEUTROPHILS 58 40 - 73 %    LYMPHOCYTES 35 21 - 52 %    MONOCYTES 6 3 - 10 %    EOSINOPHILS 1 0 - 5 %    BASOPHILS 0 0 - 2 %    ABS. NEUTROPHILS 3.7 1.8 - 8.0 K/UL    ABS. LYMPHOCYTES 2.2 0.9 - 3.6 K/UL    ABS. MONOCYTES 0.4 0.05 - 1.2 K/UL    ABS. EOSINOPHILS 0.1 0.0 - 0.4 K/UL    ABS. BASOPHILS 0.0 0.0 - 0.06 K/UL    DF AUTOMATED     TSH, 3RD GENERATION   Result Value Ref Range    TSH 0.72 0.36 - 3.74 uIU/mL     ASSESSMENT and PLAN    ICD-10-CM ICD-9-CM    1. Well woman exam Z01.419 V72.31    2. Encounter for screening for cervical cancer  Z12.4 V76.2 PAP IG, CT-NG-TV, APTIMA HPV AND RFX 86/62,08([id number])      PAP IG, CT-NG-TV, APTIMA HPV AND RFX 91/42,80([id number])   3. Mastalgia N64.4 611.71 NEW MAMMO BI SCREENING INCL CAD     current treatment plan is effective, no change in therapy  lab results and schedule of future lab studies reviewed with patient  reviewed diet, exercise and weight control  very strongly urged to quit smoking to reduce cardiovascular risk  reviewed medications and side effects in detail  radiology results and schedule of future radiology studies reviewed with patient      I have discussed the diagnosis with the patient and the intended plan of care as seen in the above orders. The patient has received an after-visit summary and questions were answered concerning future plans. I have discussed medication, side effects, and warnings with the patient in detail. The patient verbalized understanding and is in agreement with the plan of care. The patient will contact the office with any additional concerns.     Alyssa Rene MD

## 2017-04-01 LAB
C TRACH RRNA CVX QL NAA+PROBE: NEGATIVE
CYTOLOGIST CVX/VAG CYTO: NORMAL
CYTOLOGY CVX/VAG DOC THIN PREP: NORMAL
DX ICD CODE: NORMAL
HPV I/H RISK 4 DNA CVX QL PROBE+SIG AMP: NEGATIVE
Lab: NORMAL
N GONORRHOEA RRNA CVX QL NAA+PROBE: NEGATIVE
OTHER STN SPEC: NORMAL
PATH REPORT.FINAL DX SPEC: NORMAL
STAT OF ADQ CVX/VAG CYTO-IMP: NORMAL
T VAGINALIS RRNA SPEC QL NAA+PROBE: NEGATIVE

## 2017-07-25 ENCOUNTER — OFFICE VISIT (OUTPATIENT)
Dept: FAMILY MEDICINE CLINIC | Age: 32
End: 2017-07-25

## 2017-07-25 VITALS
BODY MASS INDEX: 31.27 KG/M2 | WEIGHT: 199.2 LBS | DIASTOLIC BLOOD PRESSURE: 74 MMHG | HEIGHT: 67 IN | SYSTOLIC BLOOD PRESSURE: 136 MMHG | RESPIRATION RATE: 18 BRPM | OXYGEN SATURATION: 98 % | HEART RATE: 85 BPM | TEMPERATURE: 97.6 F

## 2017-07-25 DIAGNOSIS — R10.84 GENERALIZED ABDOMINAL PAIN: Primary | ICD-10-CM

## 2017-07-25 DIAGNOSIS — R10.13 DYSPEPSIA: ICD-10-CM

## 2017-07-25 DIAGNOSIS — R00.2 PALPITATIONS: ICD-10-CM

## 2017-07-25 DIAGNOSIS — K21.00 GASTROESOPHAGEAL REFLUX DISEASE WITH ESOPHAGITIS: ICD-10-CM

## 2017-07-25 DIAGNOSIS — F41.9 ANXIETY: ICD-10-CM

## 2017-07-25 DIAGNOSIS — R19.8 CHANGE IN BOWEL FUNCTION: ICD-10-CM

## 2017-07-25 DIAGNOSIS — Z13.6 SCREENING FOR CARDIOVASCULAR CONDITION: ICD-10-CM

## 2017-07-25 LAB
BILIRUB UR QL STRIP: NEGATIVE
GLUCOSE UR-MCNC: NEGATIVE MG/DL
KETONES P FAST UR STRIP-MCNC: NEGATIVE MG/DL
PH UR STRIP: 6 [PH] (ref 4.6–8)
PROT UR QL STRIP: NEGATIVE MG/DL
SP GR UR STRIP: 1.01 (ref 1–1.03)
UA UROBILINOGEN AMB POC: NORMAL (ref 0.2–1)
URINALYSIS CLARITY POC: CLEAR
URINALYSIS COLOR POC: YELLOW
URINE BLOOD POC: NEGATIVE
URINE LEUKOCYTES POC: NEGATIVE
URINE NITRITES POC: NEGATIVE

## 2017-07-25 RX ORDER — PHENOL/SODIUM PHENOLATE
20 AEROSOL, SPRAY (ML) MUCOUS MEMBRANE DAILY
Qty: 30 TAB | Refills: 1 | Status: SHIPPED | OUTPATIENT
Start: 2017-07-25 | End: 2017-12-05

## 2017-07-25 NOTE — MR AVS SNAPSHOT
Visit Information Date & Time Provider Department Dept. Phone Encounter #  
 7/25/2017 12:00 PM Arpan Hopper MD [facility] [phone] [id number] Follow-up Instructions Return in about 3 months (around 10/25/2017), or if symptoms worsen or fail to improve, for abdominal pain, dyspepsia. Upcoming Health Maintenance Date Due INFLUENZA AGE 9 TO ADULT 8/1/2017 PAP AKA CERVICAL CYTOLOGY 3/29/2020 DTaP/Tdap/Td series (2 - Td) 2/1/2027 Allergies as of 7/25/2017  Review Complete On: 7/25/2017 By: Paula Gray MD  
 No Known Allergies Current Immunizations  Never Reviewed No immunizations on file. Not reviewed this visit You Were Diagnosed With   
  
 Codes Comments Generalized abdominal pain    -  Primary ICD-10-CM: R10.84 ICD-9-CM: 789.07 Change in bowel function     ICD-10-CM: R19.4 ICD-9-CM: 787.99 Dyspepsia     ICD-10-CM: R10.13 ICD-9-CM: 536.8 Gastroesophageal reflux disease with esophagitis     ICD-10-CM: K21.0 ICD-9-CM: 530.11 Screening for cardiovascular condition     ICD-10-CM: Z13.6 ICD-9-CM: V81.2 Vitals BP Pulse Temp Resp Height(growth percentile) Weight(growth percentile) 136/74 (BP 1 Location: Left arm, BP Patient Position: Sitting) 85 97.6 °F (36.4 °C) (Oral) 18 5' 7\" (1.702 m) 199 lb 3.2 oz (90.4 kg) SpO2 BMI OB Status Smoking Status 98% 31.2 kg/m2 Having regular periods Current Every Day Smoker BMI and BSA Data Body Mass Index Body Surface Area  
 31.2 kg/m 2 2.07 m 2 Preferred Pharmacy Pharmacy Name [address] [phone] Your Updated Medication List  
  
   
This list is accurate as of: 7/25/17 12:27 PM.  Always use your most recent med list.  
  
  
  
  
 Omeprazole delayed release 20 mg tablet Commonly known as:  PRILOSEC D/R Take 1 Tab by mouth daily. Prescriptions Sent to Pharmacy Refills Omeprazole delayed release (PRILOSEC D/R) 20 mg tablet 1 Sig: Take 1 Tab by mouth daily. Class: Normal  
 Pharmacy: [facility]  #: [phone] Route: Oral  
  
We Performed the Following AMB POC URINALYSIS DIP STICK MANUAL W/O MICRO [88761 CPT(R)] REFERRAL TO GASTROENTEROLOGY [ZKO60 Custom] Comments:  
 Please evaluate patient for abdominal pain, bloating and change in bowel habits. Follow-up Instructions Return in about 3 months (around 10/25/2017), or if symptoms worsen or fail to improve, for abdominal pain, dyspepsia. To-Do List   
 07/25/2017 Lab:  CBC WITH AUTOMATED DIFF   
  
 07/25/2017 Lab:  LIPID PANEL   
  
 07/25/2017 Lab:  METABOLIC PANEL, COMPREHENSIVE Referral Information Referral ID Referred By Referred To  
  
 [id number] Jen Glynn N Not Available Visits Status Start Date End Date 1 New Request 7/25/17 7/25/18 If your referral has a status of pending review or denied, additional information will be sent to support the outcome of this decision. Patient Instructions Indigestion (Dyspepsia or Heartburn): Care Instructions Your Care Instructions Sometimes it can be hard to pinpoint the cause of indigestion (dyspepsia or heartburn). Most cases of an upset stomach with bloating, burning, burping, and nausea are minor and go away within several hours. Home treatment and over-the-counter medicine often are able to control symptoms. But if you take medicine to relieve your indigestion without making diet and lifestyle changes, your symptoms are likely to return again and again. If you get indigestion often, it may be a sign of a more serious medical problem. Be sure to follow up with your doctor, who may want to do tests to be sure of the cause of your indigestion. Follow-up care is a key part of your treatment and safety. Be sure to make and go to all appointments, and call your doctor if you are having problems. It's also a good idea to know your test results and keep a list of the medicines you take. How can you care for yourself at home? · Your doctor may recommend over-the-counter medicine. For mild or occasional indigestion, antacids such as Tums, Gaviscon, Mylanta, or Maalox may help. Be careful when you take over-the-counter antacid medicines. Many of these medicines have aspirin in them. Read the label to make sure that you are not taking more than the recommended dose. Too much aspirin can be harmful. · Your doctor also may recommend over-the-counter acid reducers, such as Pepcid AC, Tagamet HB, Zantac 75, or Prilosec. Read and follow all instructions on the label. If you use these medicines often, talk with your doctor. · Change your eating habits. ¨ It's best to eat several small meals instead of two or three large meals. ¨ After you eat, wait 2 to 3 hours before you lie down. ¨ Chocolate, mint, and alcohol can make GERD worse. ¨ Spicy foods, foods that have a lot of acid (like tomatoes and oranges), and coffee can make GERD symptoms worse in some people. If your symptoms are worse after you eat a certain food, you may want to stop eating that food to see if your symptoms get better. · Do not smoke or chew tobacco. Smoking can make GERD worse. If you need help quitting, talk to your doctor about stop-smoking programs and medicines. These can increase your chances of quitting for good. · If you have GERD symptoms at night, raise the head of your bed 6 to 8 inches by putting the frame on blocks or placing a foam wedge under the head of your mattress. (Adding extra pillows does not work.) · Do not wear tight clothing around your middle. · Lose weight if you need to. Losing just 5 to 10 pounds can help. · Do not take anti-inflammatory medicines, such as aspirin, ibuprofen (Advil, Motrin), or naproxen (Aleve). These can irritate the stomach. If you need a pain medicine, try acetaminophen (Tylenol), which does not cause stomach upset. When should you call for help? Call 911 anytime you think you may need emergency care. For example, call if: 
· You passed out (lost consciousness). · You vomit blood or what looks like coffee grounds. · You pass maroon or very bloody stools. · You have chest pain or pressure. This may occur with: ¨ Sweating. ¨ Shortness of breath. ¨ Nausea or vomiting. ¨ Pain that spreads from the chest to the neck, jaw, or one or both shoulders or arms. ¨ Feeling dizzy or lightheaded. ¨ A fast or uneven pulse. After calling 911, chew 1 adult-strength aspirin. Wait for an ambulance. Do not try to drive yourself. Call your doctor now or seek immediate medical care if: 
· You have severe belly pain. · Your stools are black and tarlike or have streaks of blood. · You have trouble swallowing. · You are losing weight and do not know why. Watch closely for changes in your health, and be sure to contact your doctor if: 
· You do not get better as expected. Where can you learn more? Go to http://kvng-carla.info/. Enter X266 in the search box to learn more about \"Indigestion (Dyspepsia or Heartburn): Care Instructions. \" Current as of: November 16, 2016 Content Version: 11.3 © 9075-1511 [employer]. Care instructions adapted under license by [employer] (which disclaims liability or warranty for this information). If you have questions about a medical condition or this instruction, always ask your healthcare professional. [place] [address] any warranty or liability for your use of this information. Introducing [facility] SERVICES!    
 Mahi Mason introduces [employer] patient portal. Now you can access parts of your medical record, email your doctor's office, and request medication refills online. 1. In your internet browser, go to [email or web address] 2. Click on the First Time User? Click Here link in the Sign In box. You will see the New Member Sign Up page. 3. Enter your [employer] Access Code exactly as it appears below. You will not need to use this code after youve completed the sign-up process. If you do not sign up before the expiration date, you must request a new code. · [employer] Access Code: [email or web address] Expires: 10/23/2017 12:27 PM 
 
4. Enter the last four digits of your Social Security Number (xxxx) and Date of Birth (mm/dd/yyyy) as indicated and click Submit. You will be taken to the next sign-up page. 5. Create a Propagenix ID. This will be your Prop[employer] login ID and cannot be changed, so think of one that is secure and easy to remember. 6. Create a [employer] password. You can change your password at any time. 7. Enter your Password Reset Question and Answer. This can be used at a later time if you forget your password. 8. Enter your e-mail address. You will receive e-mail notification when new information is available in [address] 9. Click Sign Up. You can now view and download portions of your medical record. 10. Click the Download Summary menu link to download a portable copy of your medical information. If you have questions, please visit the Frequently Asked Questions section of the [employer] website. Remember, [employer] is NOT to be used for urgent needs. For medical emergencies, dial 911. Now available from your iPhone and Android! Please provide this summary of care documentation to your next provider. Your primary care clinician is listed as Arpan Rojo. If you have any questions after today's visit, please call [phone].

## 2017-07-25 NOTE — PROGRESS NOTES
Chief Complaint   Patient presents with    Complete Physical    Abdominal Pain     Bloated with gas      1. Have you been to the ER, urgent care clinic since your last visit? Hospitalized since your last visit? No    2. Have you seen or consulted any other health care providers outside of the [address] [place] [address] since your last visit? Include any pap smears or colon screening. No     HPI  Haley Colon comes in for f/u care:  1) Abdominal pain: Patient has abdominal pain. Abdominal pain is mainly lower abdomen and also epigastric area. She does have bloating and abdominal fullness. States she is burping a lot. She also does note change in her bowel habits. Her stool is inconsistent in terms of texture and at times is loose but she denies any diarrhea. There is no blood in the stools. She has not changed her diet but her appetite is poor. She would like to be seen by gastroenterologist.  She does note weight gain. Will check labs today   2) GERD: Patient has heartburn and reflux symptoms. In the past has used antacids with relief. I will put her on omeprazole to take daily. Past Medical History  Past Medical History:   Diagnosis Date    Gastritis        Surgical History  Past Surgical History:   Procedure Laterality Date    HX BREAST REDUCTION      HX TUBAL LIGATION          Medications      Allergies  No Known Allergies    Family History  No family history on file. Social History  Social History     Social History    Marital status: SINGLE     Spouse name: N/A    Number of children: N/A    Years of education: N/A     Occupational History    Unemployed      Social History Main Topics    Smoking status: Current Every Day Smoker     Types: Cigarettes    Smokeless tobacco: Not on file    Alcohol use Yes      Comment:  Occ    Drug use: No    Sexual activity: Yes     Other Topics Concern     Service No    Blood Transfusions No    Caffeine Concern No    Occupational Exposure No    Hobby Hazards No    Sleep Concern No    Stress Concern No    Weight Concern No    Special Diet No    Back Care No    Exercise No    Bike Helmet No    Seat Belt Yes    Self-Exams Yes     Social History Narrative       Review of Systems  Review of Systems - History obtained from chart review and the patient  General ROS: positive for  - fatigue and malaise  negative for - chills or fever  Psychological ROS: negative  Ophthalmic ROS: negative  ENT ROS: negative  Allergy and Immunology ROS: negative  Hematological and Lymphatic ROS: negative  Endocrine ROS: negative  Respiratory ROS: no cough, shortness of breath, or wheezing  Cardiovascular ROS: no chest pain or dyspnea on exertion  Gastrointestinal ROS: positive for - abdominal pain, change in bowel habits, gas/bloating and heartburn  Genito-Urinary ROS: negative  Musculoskeletal ROS: negative  Neurological ROS: negative    Vital Signs  Visit Vitals    /74 (BP 1 Location: Left arm, BP Patient Position: Sitting)    Pulse 85    Temp 97.6 °F (36.4 °C) (Oral)    Resp 18    Ht 5' 7\" (1.702 m)    Wt 199 lb 3.2 oz (90.4 kg)    SpO2 98%    BMI 31.2 kg/m2         Physical Exam  Physical Examination: General appearance - alert, well appearing, and in no distress, oriented to person, place, and time, acyanotic, in no respiratory distress and well hydrated  Mental status - alert, oriented to person, place, and time, normal mood, behavior, speech, dress, motor activity, and thought processes  Eyes - pupils equal and reactive, extraocular eye movements intact, sclera anicteric  Mouth - mucous membranes moist, pharynx normal without lesions  Neck - supple, no significant adenopathy  Lymphatics - no palpable lymphadenopathy, no hepatosplenomegaly  Chest - clear to auscultation, no wheezes, rales or rhonchi, symmetric air entry, no tachypnea, retractions or cyanosis  Heart - normal rate, regular rhythm, normal S1, S2, no murmurs, rubs, clicks or gallops  Abdomen - tenderness noted epigastric, mild. No rebound tenderness noted  bowel sounds normal  Back exam - full range of motion, no tenderness, palpable spasm or pain on motion  Neurological - alert, oriented, normal speech, no focal findings or movement disorder noted  Musculoskeletal - no joint tenderness, deformity or swelling  Extremities - peripheral pulses normal, no pedal edema, no clubbing or cyanosis    Diagnostics  No orders of the defined types were placed in this encounter. Results  Results for orders placed or performed in visit on 03/29/17   PAP IG, CT-NG-TV, APTIMA HPV AND RFX 16/18,45([id number])   Result Value Ref Range    Diagnosis Comment     Specimen adequacy Comment     Clinician provided ICD10 Comment     Performed by: Comment     . Melissa Maradiaga Note: Comment     Test methodology Comment     HPV APTIMA Negative Negative    Chlamydia, Nuc. Acid Amp Negative Negative    Gonococcus, Nuc. Acid Amp Negative Negative    Trich vag by LORETTA Negative Negative     ASSESSMENT and PLAN    ICD-10-CM ICD-9-CM    1. Generalized abdominal pain R10.84 789.07 REFERRAL TO GASTROENTEROLOGY      METABOLIC PANEL, COMPREHENSIVE      CBC WITH AUTOMATED DIFF      AMB POC URINALYSIS DIP STICK MANUAL W/O MICRO      METABOLIC PANEL, COMPREHENSIVE      CBC WITH AUTOMATED DIFF   2. Change in bowel function R19.4 787.99 REFERRAL TO GASTROENTEROLOGY   3. Dyspepsia R10.13 536.8 REFERRAL TO GASTROENTEROLOGY   4. Gastroesophageal reflux disease with esophagitis K21.0 530.11 Omeprazole delayed release (PRILOSEC D/R) 20 mg tablet   5. Screening for cardiovascular condition Z13.6 V81.2 LIPID PANEL      GA COLLECTION VENOUS BLOOD,VENIPUNCTURE      LIPID PANEL   6. Anxiety F41.9 300.00 TSH 3RD GENERATION   7.  Palpitations R00.2 785.1 TSH 3RD GENERATION     lab results and schedule of future lab studies reviewed with patient  reviewed diet, exercise and weight control  reviewed medications and side effects in detail    I have discussed the diagnosis with the patient and the intended plan of care as seen in the above orders. The patient has received an after-visit summary and questions were answered concerning future plans. I have discussed medication, side effects, and warnings with the patient in detail. The patient verbalized understanding and is in agreement with the plan of care. The patient will contact the office with any additional concerns.     Davide Rose MD

## 2017-07-25 NOTE — PATIENT INSTRUCTIONS
Indigestion (Dyspepsia or Heartburn): Care Instructions  Your Care Instructions  Sometimes it can be hard to pinpoint the cause of indigestion (dyspepsia or heartburn). Most cases of an upset stomach with bloating, burning, burping, and nausea are minor and go away within several hours. Home treatment and over-the-counter medicine often are able to control symptoms. But if you take medicine to relieve your indigestion without making diet and lifestyle changes, your symptoms are likely to return again and again. If you get indigestion often, it may be a sign of a more serious medical problem. Be sure to follow up with your doctor, who may want to do tests to be sure of the cause of your indigestion. Follow-up care is a key part of your treatment and safety. Be sure to make and go to all appointments, and call your doctor if you are having problems. It's also a good idea to know your test results and keep a list of the medicines you take. How can you care for yourself at home? · Your doctor may recommend over-the-counter medicine. For mild or occasional indigestion, antacids such as Tums, Gaviscon, Mylanta, or Maalox may help. Be careful when you take over-the-counter antacid medicines. Many of these medicines have aspirin in them. Read the label to make sure that you are not taking more than the recommended dose. Too much aspirin can be harmful. · Your doctor also may recommend over-the-counter acid reducers, such as Pepcid AC, Tagamet HB, Zantac 75, or Prilosec. Read and follow all instructions on the label. If you use these medicines often, talk with your doctor. · Change your eating habits. ¨ It's best to eat several small meals instead of two or three large meals. ¨ After you eat, wait 2 to 3 hours before you lie down. ¨ Chocolate, mint, and alcohol can make GERD worse. ¨ Spicy foods, foods that have a lot of acid (like tomatoes and oranges), and coffee can make GERD symptoms worse in some people. If your symptoms are worse after you eat a certain food, you may want to stop eating that food to see if your symptoms get better. · Do not smoke or chew tobacco. Smoking can make GERD worse. If you need help quitting, talk to your doctor about stop-smoking programs and medicines. These can increase your chances of quitting for good. · If you have GERD symptoms at night, raise the head of your bed 6 to 8 inches by putting the frame on blocks or placing a foam wedge under the head of your mattress. (Adding extra pillows does not work.)  · Do not wear tight clothing around your middle. · Lose weight if you need to. Losing just 5 to 10 pounds can help. · Do not take anti-inflammatory medicines, such as aspirin, ibuprofen (Advil, Motrin), or naproxen (Aleve). These can irritate the stomach. If you need a pain medicine, try acetaminophen (Tylenol), which does not cause stomach upset. When should you call for help? Call 911 anytime you think you may need emergency care. For example, call if:  · You passed out (lost consciousness). · You vomit blood or what looks like coffee grounds. · You pass maroon or very bloody stools. · You have chest pain or pressure. This may occur with:  ¨ Sweating. ¨ Shortness of breath. ¨ Nausea or vomiting. ¨ Pain that spreads from the chest to the neck, jaw, or one or both shoulders or arms. ¨ Feeling dizzy or lightheaded. ¨ A fast or uneven pulse. After calling 911, chew 1 adult-strength aspirin. Wait for an ambulance. Do not try to drive yourself. Call your doctor now or seek immediate medical care if:  · You have severe belly pain. · Your stools are black and tarlike or have streaks of blood. · You have trouble swallowing. · You are losing weight and do not know why. Watch closely for changes in your health, and be sure to contact your doctor if:  · You do not get better as expected. Where can you learn more? Go to http://savanna.info/.   Enter T302 in the search box to learn more about \"Indigestion (Dyspepsia or Heartburn): Care Instructions. \"  Current as of: November 16, 2016  Content Version: 11.3  © 2549-1916 [employer]. Care instructions adapted under license by [employer] (which disclaims liability or warranty for this information). If you have questions about a medical condition or this instruction, always ask your healthcare professional. [place] [address] any warranty or liability for your use of this information.

## 2017-07-26 LAB
ALBUMIN SERPL-MCNC: 4.2 G/DL (ref 3.5–5.5)
ALBUMIN/GLOB SERPL: 1.7 {RATIO} (ref 1.2–2.2)
ALP SERPL-CCNC: 62 IU/L (ref 39–117)
ALT SERPL-CCNC: 19 IU/L (ref 0–32)
AST SERPL-CCNC: 20 IU/L (ref 0–40)
BASOPHILS # BLD AUTO: 0 X10E3/UL (ref 0–0.2)
BASOPHILS NFR BLD AUTO: 0 %
BILIRUB SERPL-MCNC: 0.3 MG/DL (ref 0–1.2)
BUN SERPL-MCNC: 13 MG/DL (ref 6–20)
BUN/CREAT SERPL: 13 (ref 9–23)
CALCIUM SERPL-MCNC: 9.2 MG/DL (ref 8.7–10.2)
CHLORIDE SERPL-SCNC: 101 MMOL/L (ref 96–106)
CHOLEST SERPL-MCNC: 153 MG/DL (ref 100–199)
CO2 SERPL-SCNC: 21 MMOL/L (ref 18–29)
CREAT SERPL-MCNC: 0.97 MG/DL (ref 0.57–1)
EOSINOPHIL # BLD AUTO: 0.1 X10E3/UL (ref 0–0.4)
EOSINOPHIL NFR BLD AUTO: 1 %
ERYTHROCYTE [DISTWIDTH] IN BLOOD BY AUTOMATED COUNT: 14.5 % (ref 12.3–15.4)
GLOBULIN SER CALC-MCNC: 2.5 G/DL (ref 1.5–4.5)
GLUCOSE SERPL-MCNC: 85 MG/DL (ref 65–99)
HCT VFR BLD AUTO: 38.8 % (ref 34–46.6)
HDLC SERPL-MCNC: 66 MG/DL
HGB BLD-MCNC: 12.4 G/DL (ref 11.1–15.9)
IMM GRANULOCYTES # BLD: 0 X10E3/UL (ref 0–0.1)
IMM GRANULOCYTES NFR BLD: 0 %
INTERPRETATION, 910389: NORMAL
LDLC SERPL CALC-MCNC: 59 MG/DL (ref 0–99)
LYMPHOCYTES # BLD AUTO: 2.3 X10E3/UL (ref 0.7–3.1)
LYMPHOCYTES NFR BLD AUTO: 29 %
MCH RBC QN AUTO: 29.3 PG (ref 26.6–33)
MCHC RBC AUTO-ENTMCNC: 32 G/DL (ref 31.5–35.7)
MCV RBC AUTO: 92 FL (ref 79–97)
MONOCYTES # BLD AUTO: 0.4 X10E3/UL (ref 0.1–0.9)
MONOCYTES NFR BLD AUTO: 4 %
NEUTROPHILS # BLD AUTO: 5.1 X10E3/UL (ref 1.4–7)
NEUTROPHILS NFR BLD AUTO: 66 %
PLATELET # BLD AUTO: 194 X10E3/UL (ref 150–379)
POTASSIUM SERPL-SCNC: 4.5 MMOL/L (ref 3.5–5.2)
PROT SERPL-MCNC: 6.7 G/DL (ref 6–8.5)
RBC # BLD AUTO: 4.23 X10E6/UL (ref 3.77–5.28)
SODIUM SERPL-SCNC: 138 MMOL/L (ref 134–144)
TRIGL SERPL-MCNC: 139 MG/DL (ref 0–149)
VLDLC SERPL CALC-MCNC: 28 MG/DL (ref 5–40)
WBC # BLD AUTO: 7.9 X10E3/UL (ref 3.4–10.8)

## 2017-12-05 ENCOUNTER — OFFICE VISIT (OUTPATIENT)
Dept: FAMILY MEDICINE CLINIC | Age: 32
End: 2017-12-05

## 2017-12-05 VITALS
TEMPERATURE: 96.3 F | SYSTOLIC BLOOD PRESSURE: 130 MMHG | HEIGHT: 67 IN | RESPIRATION RATE: 16 BRPM | DIASTOLIC BLOOD PRESSURE: 73 MMHG | OXYGEN SATURATION: 98 % | HEART RATE: 89 BPM | BODY MASS INDEX: 32.02 KG/M2 | WEIGHT: 204 LBS

## 2017-12-05 DIAGNOSIS — E66.9 OBESITY (BMI 30-39.9): Primary | ICD-10-CM

## 2017-12-05 DIAGNOSIS — M54.6 LEFT-SIDED THORACIC BACK PAIN, UNSPECIFIED CHRONICITY: ICD-10-CM

## 2017-12-05 DIAGNOSIS — M25.562 LEFT KNEE PAIN, UNSPECIFIED CHRONICITY: ICD-10-CM

## 2017-12-05 DIAGNOSIS — L02.419 AXILLARY ABSCESS: ICD-10-CM

## 2017-12-05 RX ORDER — SULFAMETHOXAZOLE AND TRIMETHOPRIM 800; 160 MG/1; MG/1
1 TABLET ORAL 2 TIMES DAILY
Qty: 20 TAB | Refills: 0 | Status: SHIPPED | OUTPATIENT
Start: 2017-12-05 | End: 2017-12-15

## 2017-12-05 RX ORDER — PHENTERMINE HYDROCHLORIDE 37.5 MG/1
37.5 TABLET ORAL
Qty: 30 TAB | Refills: 0 | Status: SHIPPED | OUTPATIENT
Start: 2017-12-05 | End: 2018-12-27 | Stop reason: SDUPTHER

## 2017-12-05 RX ORDER — DICLOFENAC SODIUM 50 MG/1
50 TABLET, DELAYED RELEASE ORAL
Qty: 60 TAB | Refills: 0 | Status: SHIPPED | OUTPATIENT
Start: 2017-12-05 | End: 2018-12-27 | Stop reason: SDUPTHER

## 2017-12-05 RX ORDER — MUPIROCIN 20 MG/G
OINTMENT TOPICAL DAILY
Qty: 30 G | Refills: 0 | Status: SHIPPED | OUTPATIENT
Start: 2017-12-05 | End: 2018-12-27

## 2017-12-05 NOTE — PROGRESS NOTES
Chief Complaint   Patient presents with    Complete Physical    Knee Pain     left knee pain about 1 month now. 1. Have you been to the ER, urgent care clinic since your last visit? Hospitalized since your last visit? No    2. Have you seen or consulted any other health care providers outside of the [address] [place] [address] since your last visit? Include any pap smears or colon screening. No     HPI  Andree Pimentel comes in for follow-up care. Knee pain: Patient has left knee pain that has been ongoing for a month. She has trouble flexing the knee or bearing weight with the knee. Denies trauma or twisting of the knee. Denies any clicking sensation or sensation of the knee giving way though has discomfort going up and down inclines. Will do knee x-ray. Obesity: Patient has a BMI of 31.95. Weight has kept going up despite her trying various dietary modifications and also lifestyle modification. She is at a point where she would like to try medication for weight loss. Will give some phentermine to try for a month and I will follow-up. Back pain: Patient has pain upper back most on the left side. Feels like the muscle is tight. Pain is worse when she moves the left upper extremity. This is myofascial pain. She denies any cough, wheeze or shortness of breath. I will put her on diclofenac to take up to twice a day as needed. Will follow-up in a month. Abscesses: Patient does have axillary abscesses. These come after she shaves her armpits or uses a role in. Currently has 2  swelling on the left side and one in the right axilla. I will put on Bactrim to take twice a day for 10 days. I will give Cochran cream to apply daily.     Past Medical History  Past Medical History:   Diagnosis Date    Gastritis        Surgical History  Past Surgical History:   Procedure Laterality Date    HX BREAST REDUCTION      HX TUBAL LIGATION          Medications  Current Outpatient Prescriptions   Medication Sig Dispense Refill    trimethoprim-sulfamethoxazole (BACTRIM DS, SEPTRA DS) 160-800 mg per tablet Take 1 Tab by mouth two (2) times a day for 10 days. 20 Tab 0    mupirocin (BACTROBAN) 2 % ointment Apply  to affected area daily. 30 g 0    phentermine (ADIPEX-P) 37.5 mg tablet Take 1 Tab by mouth every morning. Max Daily Amount: 37.5 mg. 30 Tab 0    diclofenac EC (VOLTAREN) 50 mg EC tablet Take 1 Tab by mouth two (2) times daily as needed. 60 Tab 0       Allergies  No Known Allergies    Family History  No family history on file. Social History  Social History     Social History    Marital status: SINGLE     Spouse name: N/A    Number of children: N/A    Years of education: N/A     Occupational History    Unemployed      Social History Main Topics    Smoking status: Current Every Day Smoker     Types: Cigarettes    Smokeless tobacco: Never Used    Alcohol use Yes      Comment:  Occ    Drug use: No    Sexual activity: Yes     Other Topics Concern     Service No    Blood Transfusions No    Caffeine Concern No    Occupational Exposure No    Hobby Hazards No    Sleep Concern No    Stress Concern No    Weight Concern No    Special Diet No    Back Care No    Exercise No    Bike Helmet No    Seat Belt Yes    Self-Exams Yes     Social History Narrative       Review of Systems  Review of Systems - History obtained from chart review and the patient  General ROS: negative for - chills, fatigue, fever or malaise  Psychological ROS: negative  Ophthalmic ROS: negative  ENT ROS: negative  Allergy and Immunology ROS: negative  Hematological and Lymphatic ROS: negative  Respiratory ROS: no cough, shortness of breath, or wheezing  Cardiovascular ROS: no chest pain or dyspnea on exertion  Gastrointestinal ROS: no abdominal pain, change in bowel habits, or black or bloody stools  Genito-Urinary ROS: no dysuria, trouble voiding, or hematuria  Musculoskeletal ROS: positive for - joint pain and pain in back - left, upper and knee - left  Neurological ROS: negative    Vital Signs  Visit Vitals    /73 (BP 1 Location: Left arm, BP Patient Position: Sitting)    Pulse 89    Temp 96.3 °F (35.7 °C) (Oral)    Resp 16    Ht 5' 7\" (1.702 m)    Wt 204 lb (92.5 kg)    LMP 11/20/2017    SpO2 98%    BMI 31.95 kg/m2         Physical Exam  Physical Examination: General appearance - alert, well appearing, and in no distress, oriented to person, place, and time, overweight, acyanotic, in no respiratory distress and well hydrated  Mental status - alert, oriented to person, place, and time, normal mood, behavior, speech, dress, motor activity, and thought processes  Eyes - sclera anicteric  Neck - supple, no significant adenopathy  Lymphatics - no palpable lymphadenopathy  Chest - clear to auscultation, no wheezes, rales or rhonchi, symmetric air entry, no tachypnea, retractions or cyanosis  Heart - normal rate, regular rhythm, normal S1, S2, no murmurs, rubs, clicks or gallops  Abdomen - soft, nontender, nondistended, no masses or organomegaly  Back exam - Mild discomfort left parathoracic muscles  Neurological - alert, oriented, normal speech, no focal findings or movement disorder noted  Musculoskeletal -left knee pain with limitation of movement due to discomfort especially to flexion  Extremities - no pedal edema noted, intact peripheral pulses  Skin - Axillary abscesses on the right and the left side. Not draining. Nontender.     Results  Results for orders placed or performed in visit on 15/09/79   METABOLIC PANEL, COMPREHENSIVE   Result Value Ref Range    Glucose 85 65 - 99 mg/dL    BUN 13 6 - 20 mg/dL    Creatinine 0.97 0.57 - 1.00 mg/dL    GFR est non-AA 78 >59 mL/min/1.73    GFR est AA 89 >59 mL/min/1.73    BUN/Creatinine ratio 13 9 - 23    Sodium 138 134 - 144 mmol/L    Potassium 4.5 3.5 - 5.2 mmol/L    Chloride 101 96 - 106 mmol/L    CO2 21 18 - 29 mmol/L    Calcium 9.2 8.7 - 10.2 mg/dL    Protein, total 6.7 6.0 - 8.5 g/dL    Albumin 4.2 3.5 - 5.5 g/dL    GLOBULIN, TOTAL 2.5 1.5 - 4.5 g/dL    A-G Ratio 1.7 1.2 - 2.2    Bilirubin, total 0.3 0.0 - 1.2 mg/dL    Alk. phosphatase 62 39 - 117 IU/L    AST (SGOT) 20 0 - 40 IU/L    ALT (SGPT) 19 0 - 32 IU/L   LIPID PANEL   Result Value Ref Range    Cholesterol, total 153 100 - 199 mg/dL    Triglyceride 139 0 - 149 mg/dL    HDL Cholesterol 66 >39 mg/dL    VLDL, calculated 28 5 - 40 mg/dL    LDL, calculated 59 0 - 99 mg/dL   CBC WITH AUTOMATED DIFF   Result Value Ref Range    WBC 7.9 3.4 - 10.8 x10E3/uL    RBC 4.23 3.77 - 5.28 x10E6/uL    HGB 12.4 11.1 - 15.9 g/dL    HCT 38.8 34.0 - 46.6 %    MCV 92 79 - 97 fL    MCH 29.3 26.6 - 33.0 pg    MCHC 32.0 31.5 - 35.7 g/dL    RDW 14.5 12.3 - 15.4 %    PLATELET 488 272 - 492 x10E3/uL    NEUTROPHILS 66 %    Lymphocytes 29 %    MONOCYTES 4 %    EOSINOPHILS 1 %    BASOPHILS 0 %    ABS. NEUTROPHILS 5.1 1.4 - 7.0 x10E3/uL    Abs Lymphocytes 2.3 0.7 - 3.1 x10E3/uL    ABS. MONOCYTES 0.4 0.1 - 0.9 x10E3/uL    ABS. EOSINOPHILS 0.1 0.0 - 0.4 x10E3/uL    ABS. BASOPHILS 0.0 0.0 - 0.2 x10E3/uL    IMMATURE GRANULOCYTES 0 %    ABS. IMM. GRANS. 0.0 0.0 - 0.1 x10E3/uL   CVD REPORT   Result Value Ref Range    INTERPRETATION Note    AMB POC URINALYSIS DIP STICK MANUAL W/O MICRO   Result Value Ref Range    Color (UA POC) Yellow     Clarity (UA POC) Clear     Glucose (UA POC) Negative Negative    Bilirubin (UA POC) Negative Negative    Ketones (UA POC) Negative Negative    Specific gravity (UA POC) 1.015 1.001 - 1.035    Blood (UA POC) Negative Negative    pH (UA POC) 6.0 4.6 - 8.0    Protein (UA POC) Negative Negative mg/dL    Urobilinogen (UA POC) 0.2 mg/dL 0.2 - 1    Nitrites (UA POC) Negative Negative    Leukocyte esterase (UA POC) Negative Negative       ASSESSMENT and PLAN  1. Obesity (BMI 30-39.9)  - phentermine (ADIPEX-P) 37.5 mg tablet; Take 1 Tab by mouth every morning. Max Daily Amount: 37.5 mg.  Dispense: 30 Tab; Refill: 0    2.  Left knee pain, unspecified chronicity  - XR KNEE LT MIN 4 V; Future  - diclofenac EC (VOLTAREN) 50 mg EC tablet; Take 1 Tab by mouth two (2) times daily as needed. Dispense: 60 Tab; Refill: 0    3. Left-sided thoracic back pain, unspecified chronicity  - diclofenac EC (VOLTAREN) 50 mg EC tablet; Take 1 Tab by mouth two (2) times daily as needed. Dispense: 60 Tab; Refill: 0    4. Axillary abscess  - trimethoprim-sulfamethoxazole (BACTRIM DS, SEPTRA DS) 160-800 mg per tablet; Take 1 Tab by mouth two (2) times a day for 10 days. Dispense: 20 Tab; Refill: 0  - mupirocin (BACTROBAN) 2 % ointment; Apply  to affected area daily. Dispense: 30 g; Refill: 0    reviewed diet, exercise and weight control  reviewed medications and side effects in detail  radiology results and schedule of future radiology studies reviewed with patient    I have discussed the diagnosis with the patient and the intended plan of care as seen in the above orders. The patient has received an after-visit summary and questions were answered concerning future plans. I have discussed medication, side effects, and warnings with the patient in detail. The patient verbalized understanding and is in agreement with the plan of care. The patient will contact the office with any additional concerns.     Karely Linton MD

## 2017-12-05 NOTE — MR AVS SNAPSHOT
Visit Information Date & Time Provider Department Dept. Phone Encounter #  
 12/5/2017  2:30 PM Arpan Black MD [place] [id number] Follow-up Instructions Return in about 1 month (around 1/5/2018), or if symptoms worsen or fail to improve, for weight management, knee pain. Upcoming Health Maintenance Date Due  
 PAP AKA CERVICAL CYTOLOGY 3/29/2020 DTaP/Tdap/Td series (2 - Td) 2/1/2027 Allergies as of 12/5/2017  Review Complete On: 12/5/2017 By: Mary Shah No Known Allergies Current Immunizations  Never Reviewed No immunizations on file. Not reviewed this visit You Were Diagnosed With   
  
 Codes Comments Obesity (BMI 30-39.9)    -  Primary ICD-10-CM: K07.7 ICD-9-CM: 278.00 Left knee pain, unspecified chronicity     ICD-10-CM: P55.222 ICD-9-CM: 719.46 Left-sided thoracic back pain, unspecified chronicity     ICD-10-CM: M54.6 ICD-9-CM: 724.1 Axillary abscess     ICD-10-CM: L02.419 ICD-9-CM: 234. 3 Vitals BP Pulse Temp Resp Height(growth percentile) Weight(growth percentile) 130/73 (BP 1 Location: Left arm, BP Patient Position: Sitting) 89 96.3 °F (35.7 °C) (Oral) 16 5' 7\" (1.702 m) 204 lb (92.5 kg) LMP SpO2 BMI OB Status Smoking Status 11/20/2017 98% 31.95 kg/m2 Having regular periods Current Every Day Smoker BMI and BSA Data Body Mass Index Body Surface Area 31.95 kg/m 2 2.09 m 2 Preferred Pharmacy Pharmacy Name Phone [id number] [address] Your Updated Medication List  
  
   
This list is accurate as of: 12/5/17  3:03 PM.  Always use your most recent med list.  
  
  
  
  
 diclofenac EC 50 mg EC tablet Commonly known as:  VOLTAREN Take 1 Tab by mouth two (2) times daily as needed. mupirocin 2 % ointment Commonly known as:  [facility]  
 Apply  to affected area daily. phentermine 37.5 mg tablet Commonly known as:  ADIPEX-P Take 1 Tab by mouth every morning. Max Daily Amount: 37.5 mg.  
  
 trimethoprim-sulfamethoxazole 160-800 mg per tablet Commonly known as:  BACTRIM DS, SEPTRA DS Take 1 Tab by mouth two (2) times a day for 10 days. Prescriptions Printed Refills  
 phentermine (ADIPEX-P) 37.5 mg tablet 0 Sig: Take 1 Tab by mouth every morning. Max Daily Amount: 37.5 mg.  
 Class: Print Route: Oral  
  
Prescriptions Sent to Pharmacy Refills  
 trimethoprim-sulfamethoxazole (BACTRIM DS, SEPTRA DS) 160-800 mg per tablet 0 Sig: Take 1 Tab by mouth two (2) times a day for 10 days. Class: Normal  
 Pharmacy: [facility] [address] [place] [address] Ph #: [phone] Route: Oral  
 mupirocin (BACTROBAN) 2 % ointment 0 Sig: Apply  to affected area daily. Class: Normal  
 Pharmacy: [facility] [address] [place] [address] Ph #: [phone] Route: Topical  
 diclofenac EC (VOLTAREN) 50 mg EC tablet 0 Sig: Take 1 Tab by mouth two (2) times daily as needed. Class: Normal  
 Pharmacy: [facility] [address] [place] [address] Ph #: [phone] Route: Oral  
  
Follow-up Instructions Return in about 1 month (around 1/5/2018), or if symptoms worsen or fail to improve, for weight management, knee pain. To-Do List   
 12/05/2017 Imaging:  XR KNEE LT MIN 4 V Introducing [facility]! Cory Bhakta introduces [employer] patient portal. Now you can access parts of your medical record, email your doctor's office, and request medication refills online. 1. In your internet browser, go to [email or web address] 2. Click on the First Time User? Click Here link in the Sign In box. You will see the New Member Sign Up page. 3. Enter your [employer] Access Code exactly as it appears below.  You will not need to use this code after youve completed the sign-up process. If you do not sign up before the expiration date, you must request a new code. · [employer] Access Code: [email or web address] Expires: 3/5/2018  3:03 PM 
 
4. Enter the last four digits of your Social Security Number (xxxx) and Date of Birth (mm/dd/yyyy) as indicated and click Submit. You will be taken to the next sign-up page. 5. Create a Neofonie ID. This will be your Ne[employer]ie login ID and cannot be changed, so think of one that is secure and easy to remember. 6. Create a Ne[employer]onie password. You can change your password at any time. 7. Enter your Password Reset Question and Answer. This can be used at a later time if you forget your password. 8. Enter your e-mail address. You will receive e-mail notification when new information is available in [address]. 9. Click Sign Up. You can now view and download portions of your medical record. 10. Click the Download Summary menu link to download a portable copy of your medical information. If you have questions, please visit the Frequently Asked Questions section of the [employer] website. Remember, [employer] is NOT to be used for urgent needs. For medical emergencies, dial 911. Now available from your iPhone and Android! Please provide this summary of care documentation to your next provider. Your primary care clinician is listed as Arpan Rojo. If you have any questions after today's visit, please call [phone].

## 2017-12-11 ENCOUNTER — TELEPHONE (OUTPATIENT)
Dept: FAMILY MEDICINE CLINIC | Age: 32
End: 2017-12-11

## 2017-12-11 NOTE — TELEPHONE ENCOUNTER
Informed patient of normal x-ray results at this time. Patient will also receive a letter in the mail this week with normal results.

## 2018-12-27 ENCOUNTER — OFFICE VISIT (OUTPATIENT)
Dept: FAMILY MEDICINE CLINIC | Age: 33
End: 2018-12-27

## 2018-12-27 VITALS
WEIGHT: 197 LBS | HEIGHT: 67 IN | HEART RATE: 85 BPM | TEMPERATURE: 98.2 F | RESPIRATION RATE: 18 BRPM | SYSTOLIC BLOOD PRESSURE: 131 MMHG | DIASTOLIC BLOOD PRESSURE: 76 MMHG | BODY MASS INDEX: 30.92 KG/M2 | OXYGEN SATURATION: 99 %

## 2018-12-27 DIAGNOSIS — M25.562 LEFT KNEE PAIN, UNSPECIFIED CHRONICITY: Primary | ICD-10-CM

## 2018-12-27 DIAGNOSIS — E66.9 OBESITY (BMI 30-39.9): ICD-10-CM

## 2018-12-27 RX ORDER — PHENTERMINE HYDROCHLORIDE 37.5 MG/1
37.5 TABLET ORAL
Qty: 30 TAB | Refills: 0 | Status: SHIPPED | OUTPATIENT
Start: 2018-12-27 | End: 2019-06-06

## 2018-12-27 RX ORDER — DICLOFENAC SODIUM 50 MG/1
50 TABLET, DELAYED RELEASE ORAL
Qty: 60 TAB | Refills: 0 | Status: SHIPPED | OUTPATIENT
Start: 2018-12-27 | End: 2019-02-04

## 2018-12-27 NOTE — LETTER
NOTIFICATION RETURN TO WORK  
 
12/27/2018 3:04 PM 
 
Ms. Emiliano Cope [phone] [facility] [address] To Whom It May Concern: 
 
Emiliano Cope is currently under the care of [facility]. She should avoid putting weight on or straining her left lower extremity while at work until she is reviewed by the orthopedic physician. If there are questions or concerns please have the patient contact our office. Sincerely, Bipin Goodman MD

## 2018-12-27 NOTE — PROGRESS NOTES
Chief Complaint   Patient presents with    Knee Swelling     left knee pain and swelling for approx one week     1. Have you been to the ER, urgent care clinic since your last visit? Hospitalized since your last visit? Yes Oct 12, 2018 [facility] for Knee injury    2. Have you seen or consulted any other health care providers outside of the [address] [place] [address] since your last visit? Include any pap smears or colon screening. No     HPI  Demetrius Oliveira comes in for follow-up care. Patient has knee pain. This is left knee. Has been ongoing for more than a week. 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 She is now limping. At times the knee does swell. She had a knee x-ray done last year that was negative. She denies fall or trauma to the knee. I will place a referral for her to be seen by the orthopedist.  Currently will prescribe some diclofenac 50 mg up to 3 times daily as needed for the knee pain. She can wear a knee brace for comfort. Patient would like some restrictions at the workplace. I did give [place] Number for this. Patient has obesity. She has a BMI of 30.85. In the past has been on phentermine but did discontinue the medication at that give her dry mouth. She would like to try the medication again for weight loss. Discussed the use of phentermine. I will only give it for 3 months. She is to do lifestyle and dietary modification. Prescription was given. The patient declines the flu vaccine. Past Medical History  Past Medical History:   Diagnosis Date    Gastritis        Surgical History  Past Surgical History:   Procedure Laterality Date    HX BREAST REDUCTION      HX TUBAL LIGATION          Medications  Current Outpatient Medications   Medication Sig Dispense Refill    diclofenac EC (VOLTAREN) 50 mg EC tablet Take 1 Tab by mouth three (3) times daily as needed.  60 Tab 0    phentermine (ADIPEX-P) 37.5 mg tablet Take 1 Tab by mouth every morning. Max Daily Amount: 37.5 mg. 30 Tab 0       Allergies  No Known Allergies    Family History  History reviewed. No pertinent family history. Social History  Social History     Socioeconomic History    Marital status: SINGLE     Spouse name: Not on file    Number of children: Not on file    Years of education: Not on file    Highest education level: Not on file   Social Needs    Financial resource strain: Not on file    Food insecurity - worry: Not on file    Food insecurity - inability: Not on file   [employer] needs - medical: Not on file   [employer] needs - non-medical: Not on file   Occupational History    Occupation: Unemployed   Tobacco Use    Smoking status: Current Every Day Smoker     Types: Cigarettes    Smokeless tobacco: Never Used   Substance and Sexual Activity    Alcohol use: Yes     Comment: Occ    Drug use: No    Sexual activity: Yes   Other Topics Concern     Service No    Blood Transfusions No    Caffeine Concern No    Occupational Exposure No    Hobby Hazards No    Sleep Concern No    Stress Concern No    Weight Concern No    Special Diet No    Back Care No    Exercise No    Bike Helmet No    Seat Belt Yes    Self-Exams Yes   Social History Narrative    Not on file       Review of Systems  Review of Systems -review of systems is negative except as noted above in the HPI.     Vital Signs  Visit Vitals  /76 (BP 1 Location: Left arm, BP Patient Position: Sitting)   Pulse 85   Temp 98.2 °F (36.8 °C) (Oral)   Resp 18   Ht 5' 7\" (1.702 m)   Wt 197 lb (89.4 kg)   LMP 12/19/2018 (Approximate)   SpO2 99%   BMI 30.85 kg/m²         Physical Exam  Physical Examination: General appearance - alert, well appearing, and in no distress, oriented to person, place, and time and overweight  Mental status - alert, oriented to person, place, and time, affect appropriate to mood  Lymphatics - no palpable lymphadenopathy  Chest - clear to auscultation, no wheezes, rales or rhonchi, symmetric air entry  Heart - normal rate, regular rhythm, normal S1, S2, no murmurs, rubs, clicks or gallops  Neurological - alert, oriented, normal speech, no focal findings or movement disorder noted  Musculoskeletal -tenderness left knee especially around the patella margins and patella tendon. Ligamentous exam is stable. No obvious swelling of the knee. Extremities - no pedal edema noted, intact peripheral pulses    Results  Results for orders placed or performed in visit on 85/76/17   METABOLIC PANEL, COMPREHENSIVE   Result Value Ref Range    Glucose 85 65 - 99 mg/dL    BUN 13 6 - 20 mg/dL    Creatinine 0.97 0.57 - 1.00 mg/dL    GFR est non-AA 78 >59 mL/min/1.73    GFR est AA 89 >59 mL/min/1.73    BUN/Creatinine ratio 13 9 - 23    Sodium 138 134 - 144 mmol/L    Potassium 4.5 3.5 - 5.2 mmol/L    Chloride 101 96 - 106 mmol/L    CO2 21 18 - 29 mmol/L    Calcium 9.2 8.7 - 10.2 mg/dL    Protein, total 6.7 6.0 - 8.5 g/dL    Albumin 4.2 3.5 - 5.5 g/dL    GLOBULIN, TOTAL 2.5 1.5 - 4.5 g/dL    A-G Ratio 1.7 1.2 - 2.2    Bilirubin, total 0.3 0.0 - 1.2 mg/dL    Alk. phosphatase 62 39 - 117 IU/L    AST (SGOT) 20 0 - 40 IU/L    ALT (SGPT) 19 0 - 32 IU/L   LIPID PANEL   Result Value Ref Range    Cholesterol, total 153 100 - 199 mg/dL    Triglyceride 139 0 - 149 mg/dL    HDL Cholesterol 66 >39 mg/dL    VLDL, calculated 28 5 - 40 mg/dL    LDL, calculated 59 0 - 99 mg/dL   CBC WITH AUTOMATED DIFF   Result Value Ref Range    WBC 7.9 3.4 - 10.8 x10E3/uL    RBC 4.23 3.77 - 5.28 x10E6/uL    HGB 12.4 11.1 - 15.9 g/dL    HCT 38.8 34.0 - 46.6 %    MCV 92 79 - 97 fL    MCH 29.3 26.6 - 33.0 pg    MCHC 32.0 31.5 - 35.7 g/dL    RDW 14.5 12.3 - 15.4 %    PLATELET 001 446 - 269 x10E3/uL    NEUTROPHILS 66 %    Lymphocytes 29 %    MONOCYTES 4 %    EOSINOPHILS 1 %    BASOPHILS 0 %    ABS. NEUTROPHILS 5.1 1.4 - 7.0 x10E3/uL    Abs Lymphocytes 2.3 0.7 - 3.1 x10E3/uL    ABS. MONOCYTES 0.4 0.1 - 0.9 x10E3/uL    ABS. EOSINOPHILS 0.1 0.0 - 0.4 x10E3/uL    ABS. BASOPHILS 0.0 0.0 - 0.2 x10E3/uL    IMMATURE GRANULOCYTES 0 %    ABS. IMM. GRANS. 0.0 0.0 - 0.1 x10E3/uL   CVD REPORT   Result Value Ref Range    INTERPRETATION Note    AMB POC URINALYSIS DIP STICK MANUAL W/O MICRO   Result Value Ref Range    Color (UA POC) Yellow     Clarity (UA POC) Clear     Glucose (UA POC) Negative Negative    Bilirubin (UA POC) Negative Negative    Ketones (UA POC) Negative Negative    Specific gravity (UA POC) 1.015 1.001 - 1.035    Blood (UA POC) Negative Negative    pH (UA POC) 6.0 4.6 - 8.0    Protein (UA POC) Negative Negative mg/dL    Urobilinogen (UA POC) 0.2 mg/dL 0.2 - 1    Nitrites (UA POC) Negative Negative    Leukocyte esterase (UA POC) Negative Negative       ASSESSMENT and PLAN    ICD-10-CM ICD-9-CM    1. Left knee pain, unspecified chronicity M25.562 719.46 diclofenac EC (VOLTAREN) 50 mg EC tablet      REFERRAL TO ORTHOPEDICS   2. Obesity (BMI 30-39. 9) E66.9 278.00 phentermine (ADIPEX-P) 37.5 mg tablet   Discussed the patient's BMI with her. The BMI follow up plan is as follows:     dietary management education, guidance, and counseling  encourage exercise  monitor weight  reviewed diet, exercise and weight control  reviewed medications and side effects in detail      I have discussed the diagnosis with the patient and the intended plan of care as seen in the above orders. The patient has received an after-visit summary and questions were answered concerning future plans. I have discussed medication, side effects, and warnings with the patient in detail. The patient verbalized understanding and is in agreement with the plan of care. The patient will contact the office with any additional concerns.     Letitia Sen MD

## 2018-12-27 NOTE — PATIENT INSTRUCTIONS
Body Mass Index: Care Instructions  Your Care Instructions    Body mass index (BMI) can help you see if your weight is raising your risk for health problems. It uses a formula to compare how much you weigh with how tall you are. · A BMI lower than 18.5 is considered underweight. · A BMI between 18.5 and 24.9 is considered healthy. · A BMI between 25 and 29.9 is considered overweight. A BMI of 30 or higher is considered obese. If your BMI is in the normal range, it means that you have a lower risk for weight-related health problems. If your BMI is in the overweight or obese range, you may be at increased risk for weight-related health problems, such as high blood pressure, heart disease, stroke, arthritis or joint pain, and diabetes. If your BMI is in the underweight range, you may be at increased risk for health problems such as fatigue, lower protection (immunity) against illness, muscle loss, bone loss, hair loss, and hormone problems. BMI is just one measure of your risk for weight-related health problems. You may be at higher risk for health problems if you are not active, you eat an unhealthy diet, or you drink too much alcohol or use tobacco products. Follow-up care is a key part of your treatment and safety. Be sure to make and go to all appointments, and call your doctor if you are having problems. It's also a good idea to know your test results and keep a list of the medicines you take. How can you care for yourself at home? · Practice healthy eating habits. This includes eating plenty of fruits, vegetables, whole grains, lean protein, and low-fat dairy. · If your doctor recommends it, get more exercise. Walking is a good choice. Bit by bit, increase the amount you walk every day. Try for at least 30 minutes on most days of the week. · Do not smoke. Smoking can increase your risk for health problems. If you need help quitting, talk to your doctor about stop-smoking programs and medicines. These can increase your chances of quitting for good. · Limit alcohol to 2 drinks a day for men and 1 drink a day for women. Too much alcohol can cause health problems. If you have a BMI higher than 25  · Your doctor may do other tests to check your risk for weight-related health problems. This may include measuring the distance around your waist. A waist measurement of more than 40 inches in men or 35 inches in women can increase the risk of weight-related health problems. · Talk with your doctor about steps you can take to stay healthy or improve your health. You may need to make lifestyle changes to lose weight and stay healthy, such as changing your diet and getting regular exercise. If you have a BMI lower than 18.5  · Your doctor may do other tests to check your risk for health problems. · Talk with your doctor about steps you can take to stay healthy or improve your health. You may need to make lifestyle changes to gain or maintain weight and stay healthy, such as getting more healthy foods in your diet and doing exercises to build muscle. Where can you learn more? Go to http://kvng-carla.info/. Enter S176 in the search box to learn more about \"Body Mass Index: Care Instructions. \"  Current as of: October 13, 2016  Content Version: 11.4  © 9803-7239 Healthwise, Incorporated. Care instructions adapted under license by [employer] (which disclaims liability or warranty for this information). If you have questions about a medical condition or this instruction, always ask your healthcare professional. [address] any warranty or liability for your use of this information.

## 2019-01-09 ENCOUNTER — OFFICE VISIT (OUTPATIENT)
Dept: ORTHOPEDIC SURGERY | Age: 34
End: 2019-01-09

## 2019-01-09 VITALS
SYSTOLIC BLOOD PRESSURE: 126 MMHG | WEIGHT: 197.8 LBS | RESPIRATION RATE: 16 BRPM | OXYGEN SATURATION: 97 % | HEIGHT: 67 IN | HEART RATE: 89 BPM | TEMPERATURE: 98.8 F | BODY MASS INDEX: 31.04 KG/M2 | DIASTOLIC BLOOD PRESSURE: 90 MMHG

## 2019-01-09 DIAGNOSIS — M22.2X2 PATELLOFEMORAL SYNDROME OF LEFT KNEE: Primary | ICD-10-CM

## 2019-01-09 DIAGNOSIS — M25.562 LEFT KNEE PAIN, UNSPECIFIED CHRONICITY: ICD-10-CM

## 2019-01-09 RX ORDER — MELOXICAM 15 MG/1
15 TABLET ORAL
Qty: 30 TAB | Refills: 1 | Status: SHIPPED | OUTPATIENT
Start: 2019-01-09 | End: 2019-02-04

## 2019-01-09 NOTE — LETTER
NOTIFICATION RETURN TO WORK / SCHOOL 
 
1/9/2019 3:33 PM 
 
Ms. Vane Rodriguez [address] [facility] [address] To Whom It May Concern: 
 
Vane Rodriguez is currently under the care of [address] [place] [address] [place]. She will return to work on: 1/14/19 with no restrictions. If there are questions or concerns please have the patient contact our office. Sincerely, Garry Coppola MD

## 2019-01-09 NOTE — PROGRESS NOTES
Tangela Vivar  1985   Chief Complaint   Patient presents with    Knee Pain     LEFT KNEE PAIN        HISTORY OF PRESENT ILLNESS  Tangela Vivar is a 35 y.o. female who presents today for evaluation of left knee pain. The patient was referred by Dr. Vangie Hawley. She rates her pain 8/10 today. Pain has been present for awhile. The pain is worst after sitting for a long time. Patient describes the pain as aching that is Intermittent in nature. Symptoms are worse with prolonged walking and standing, steps, squatting , Activity and is better with  Rest. Associated symptoms include locking. Since problem started, it: has worsened. Pain does wake patient up at night. Has taken no meds for the problem. Has tried following treatments: Injections:NO; Brace:NO; Therapy:NO; Cane/Crutch:NO       No Known Allergies     Past Medical History:   Diagnosis Date    Gastritis       Social History     Socioeconomic History    Marital status: SINGLE     Spouse name: Not on file    Number of children: Not on file    Years of education: Not on file    Highest education level: Not on file   Social Needs    Financial resource strain: Not on file    Food insecurity - worry: Not on file    Food insecurity - inability: Not on file    Transportation needs - medical: Not on file   [employer] needs - non-medical: Not on file   Occupational History    Occupation: Unemployed   Tobacco Use    Smoking status: Current Every Day Smoker     Types: Cigarettes    Smokeless tobacco: Never Used   Substance and Sexual Activity    Alcohol use: Yes     Comment:  Occ    Drug use: No    Sexual activity: Yes   Other Topics Concern     Service No    Blood Transfusions No    Caffeine Concern No    Occupational Exposure No    Hobby Hazards No    Sleep Concern No    Stress Concern No    Weight Concern No    Special Diet No    Back Care No    Exercise No    Bike Helmet No    Seat Belt Yes    Self-Exams Yes   Social History Narrative    Not on file      Past Surgical History:   Procedure Laterality Date    HX BREAST REDUCTION      HX TUBAL LIGATION        History reviewed. No pertinent family history. Current Outpatient Medications   Medication Sig    diclofenac EC (VOLTAREN) 50 mg EC tablet Take 1 Tab by mouth three (3) times daily as needed.  phentermine (ADIPEX-P) 37.5 mg tablet Take 1 Tab by mouth every morning. Max Daily Amount: 37.5 mg. No current facility-administered medications for this visit. REVIEW OF SYSTEM   Patient denies: Weight loss, Fever/Chills, HA, Visual changes, Fatigue, Chest pain, SOB, Abdominal pain, N/V/D/C, Blood in stool or urine, Edema. Pertinent positive as above in HPI. All others were negative    PHYSICAL EXAM:   Visit Vitals  /90   Pulse 89   Temp 98.8 °F (37.1 °C) (Oral)   Resp 16   Ht 5' 7\" (1.702 m)   Wt 197 lb 12.8 oz (89.7 kg)   LMP 12/19/2018 (Approximate)   SpO2 97%   BMI 30.98 kg/m²     The patient is a well-developed, well-nourished female   in no acute distress. The patient is alert and oriented times three. The patient is alert and oriented times three. Mood and affect are normal.  LYMPHATIC: lymph nodes are not enlarged and are within normal limits  SKIN: normal in color and non tender to palpation. There are no bruises or abrasions noted. NEUROLOGICAL: Motor sensory exam is within normal limits. Reflexes are equal bilaterally.  There is normal sensation to pinprick and light touch  MUSCULOSKELETAL:  Examination Left knee   Skin Intact   Range of motion 0-130   Effusion -   Medial joint line tenderness -   Lateral joint line tenderness -   Tenderness Pes Bursa -   Tenderness insertion MCL -   Tenderness insertion LCL -   Corys -   Patella crepitus +   Patella grind -   Lachman -   Pivot shift -   Anterior drawer -   Posterior drawer -   Varus stress -   Valgus stress -   Neurovascular Intact   Calf Swelling and Tenderness to Palpation -   Tawny's Test -   Hamstring Cord Tightness +     IMAGING: XR of left knee dated 1/9/19 was reviewed and read: No acute abnormalities     IMPRESSION:      ICD-10-CM ICD-9-CM    1. Patellofemoral syndrome of left knee M22.2X2 719.46 REFERRAL TO PHYSICAL THERAPY      meloxicam (MOBIC) 15 mg tablet   2. Left knee pain, unspecified chronicity M25.562 719.46 AMB POC XRAY, KNEE; 1/2 VIEWS      REFERRAL TO PHYSICAL THERAPY      meloxicam (MOBIC) 15 mg tablet        PLAN:  1. The patient presents today with left knee pain due to patellofemoral syndrome and I would like her to begin PT. Risk factors include: n/a  2. No ultrasound exam indicated today  3. No cortisone injection indicated today   4. Yes Physical Therapy indicated today  5. No diagnostic test indicated today:   6. No durable medical equipment indicated today  7. No referral indicated today   8. Yes medications indicated today: MOBIC  9. No Narcotic indicated today       RTC 4 weeks  Follow-up Disposition: Not on File  Office note will be sent to referring provider. Scribed by Wil Alberto ([facility]) as dictated by MD SHABANA Poole, Dr. Denisha Fraser, confirm that all documentation is accurate.     Denisha Fraser M.D.   [facility]

## 2019-01-09 NOTE — LETTER
NOTIFICATION RETURN TO WORK / SCHOOL 
 
1/9/2019 3:25 PM 
 
Ms. Yvonne Avalos [address] [facility] [address] To Whom It May Concern: 
 
Yvonne Avalos is currently under the care of [address] [place] [address] [place]. She can return to work with no restrictions. If there are questions or concerns please have the patient contact our office. Sincerely, Peter Cheek MD

## 2019-01-09 NOTE — PROGRESS NOTES
1. Have you been to the ER, urgent care clinic since your last visit? Hospitalized since your last visit? No    2. Have you seen or consulted any other health care providers outside of the [address] [place] [address] since your last visit? Include any pap smears or colon screening.  No

## 2019-01-31 ENCOUNTER — ED HISTORICAL/CONVERTED ENCOUNTER (OUTPATIENT)
Dept: OTHER | Age: 34
End: 2019-01-31

## 2019-02-04 ENCOUNTER — ED HISTORICAL/CONVERTED ENCOUNTER (OUTPATIENT)
Dept: OTHER | Age: 34
End: 2019-02-04

## 2019-02-04 ENCOUNTER — OFFICE VISIT (OUTPATIENT)
Dept: FAMILY MEDICINE CLINIC | Age: 34
End: 2019-02-04

## 2019-02-04 VITALS
BODY MASS INDEX: 30.92 KG/M2 | TEMPERATURE: 98.9 F | WEIGHT: 197 LBS | HEART RATE: 72 BPM | HEIGHT: 67 IN | RESPIRATION RATE: 16 BRPM | OXYGEN SATURATION: 99 % | SYSTOLIC BLOOD PRESSURE: 125 MMHG | DIASTOLIC BLOOD PRESSURE: 76 MMHG

## 2019-02-04 DIAGNOSIS — M54.50 RIGHT-SIDED LOW BACK PAIN WITHOUT SCIATICA, UNSPECIFIED CHRONICITY: Primary | ICD-10-CM

## 2019-02-04 NOTE — LETTER
NOTIFICATION RETURN TO WORK 
 
2/4/2019 4:50 PM 
 
Ms. Lpoez Headley [address] [facility] [address] To Whom It May Concern: 
 
Lopez Headley is currently under the care of [facility]. She says that she has been unable to work since 02/01/2019. She will return to work on: 02/07/2019. She should not lift any loads above 20 pounds until she is seen by the orthopedic physician. If there are questions or concerns please have the patient contact our office. Sincerely, Sergio Sharif MD

## 2019-02-04 NOTE — PROGRESS NOTES
Chief Complaint   Patient presents with    LOW BACK PAIN     work injury on 01-     1. Have you been to the ER, urgent care clinic since your last visit? Hospitalized since your last visit? Yes [facility] on 01-31-19    2. Have you seen or consulted any other health care providers outside of the [address] [place] [address] since your last visit? Include any pap smears or colon screening. No     HPI  Mario Leal comes in for acute care. Patient has back pain. States that she strained her back while she was at work last week. Since then has been on the muscle relaxant. Pain persists. Was also given ibuprofen but she has not been taking this. Preferred use a muscle relaxant. She went back to work on restrictions but the pain has gotten worse. She feels that she is unable to go back to work given the persistence of symptoms. He denies numbness or tingling of the lower extremities. No bladder or bowel dysfunction. Pain is more so on her right side. Feels more like a muscle spasm. Past Medical History  Past Medical History:   Diagnosis Date    Gastritis        Surgical History  Past Surgical History:   Procedure Laterality Date    HX BREAST REDUCTION      HX TUBAL LIGATION          Medications  Current Outpatient Medications   Medication Sig Dispense Refill    phentermine (ADIPEX-P) 37.5 mg tablet Take 1 Tab by mouth every morning. Max Daily Amount: 37.5 mg. 30 Tab 0    meloxicam (MOBIC) 15 mg tablet Take 1 Tab by mouth daily (with breakfast). 30 Tab 1    diclofenac EC (VOLTAREN) 50 mg EC tablet Take 1 Tab by mouth three (3) times daily as needed. 60 Tab 0       Allergies  No Known Allergies    Family History  History reviewed. No pertinent family history.     Social History  Social History     Socioeconomic History    Marital status: SINGLE     Spouse name: Not on file    Number of children: Not on file    Years of education: Not on file    Highest education level: Not on file   Social Needs    Financial resource strain: Not on file    Food insecurity - worry: Not on file    Food insecurity - inability: Not on file    Transportation needs - medical: Not on file   [employer] needs - non-medical: Not on file   Occupational History    Occupation: Unemployed   Tobacco Use    Smoking status: Current Every Day Smoker     Types: Cigarettes    Smokeless tobacco: Never Used   Substance and Sexual Activity    Alcohol use: Yes     Comment: Occ    Drug use: No    Sexual activity: Yes   Other Topics Concern     Service No    Blood Transfusions No    Caffeine Concern No    Occupational Exposure No    Hobby Hazards No    Sleep Concern No    Stress Concern No    Weight Concern No    Special Diet No    Back Care No    Exercise No    Bike Helmet No    Seat Belt Yes    Self-Exams Yes   Social History Narrative    Not on file       Review of Systems  Review of Systems -review of all systems negative except as noted above in the HPI.     Vital Signs  Visit Vitals  /76 (BP 1 Location: Left arm, BP Patient Position: Sitting)   Pulse 72   Temp 98.9 °F (37.2 °C) (Oral)   Resp 16   Ht 5' 7\" (1.702 m)   Wt 197 lb (89.4 kg)   LMP 01/12/2019 (Exact Date)   SpO2 99%   BMI 30.85 kg/m²         Physical Exam  Physical Examination: General appearance - oriented to person, place, and time and acyanotic, in no respiratory distress  Mental status - alert, oriented to person, place, and time, affect appropriate to mood  Neck - supple, no significant adenopathy  Lymphatics - no palpable lymphadenopathy  Chest - clear to auscultation, no wheezes, rales or rhonchi, symmetric air entry  Heart - normal rate and regular rhythm, S1 and S2 normal  Back exam - limited range of motion, pain with motion noted during exam, tenderness noted right paralumbar muscles  Neurological - motor and sensory grossly normal bilaterally    Results  Results for orders placed or performed in visit on 49/04/03   METABOLIC PANEL, COMPREHENSIVE   Result Value Ref Range    Glucose 85 65 - 99 mg/dL    BUN 13 6 - 20 mg/dL    Creatinine 0.97 0.57 - 1.00 mg/dL    GFR est non-AA 78 >59 mL/min/1.73    GFR est AA 89 >59 mL/min/1.73    BUN/Creatinine ratio 13 9 - 23    Sodium 138 134 - 144 mmol/L    Potassium 4.5 3.5 - 5.2 mmol/L    Chloride 101 96 - 106 mmol/L    CO2 21 18 - 29 mmol/L    Calcium 9.2 8.7 - 10.2 mg/dL    Protein, total 6.7 6.0 - 8.5 g/dL    Albumin 4.2 3.5 - 5.5 g/dL    GLOBULIN, TOTAL 2.5 1.5 - 4.5 g/dL    A-G Ratio 1.7 1.2 - 2.2    Bilirubin, total 0.3 0.0 - 1.2 mg/dL    Alk. phosphatase 62 39 - 117 IU/L    AST (SGOT) 20 0 - 40 IU/L    ALT (SGPT) 19 0 - 32 IU/L   LIPID PANEL   Result Value Ref Range    Cholesterol, total 153 100 - 199 mg/dL    Triglyceride 139 0 - 149 mg/dL    HDL Cholesterol 66 >39 mg/dL    VLDL, calculated 28 5 - 40 mg/dL    LDL, calculated 59 0 - 99 mg/dL   CBC WITH AUTOMATED DIFF   Result Value Ref Range    WBC 7.9 3.4 - 10.8 x10E3/uL    RBC 4.23 3.77 - 5.28 x10E6/uL    HGB 12.4 11.1 - 15.9 g/dL    HCT 38.8 34.0 - 46.6 %    MCV 92 79 - 97 fL    MCH 29.3 26.6 - 33.0 pg    MCHC 32.0 31.5 - 35.7 g/dL    RDW 14.5 12.3 - 15.4 %    PLATELET 968 926 - 281 x10E3/uL    NEUTROPHILS 66 %    Lymphocytes 29 %    MONOCYTES 4 %    EOSINOPHILS 1 %    BASOPHILS 0 %    ABS. NEUTROPHILS 5.1 1.4 - 7.0 x10E3/uL    Abs Lymphocytes 2.3 0.7 - 3.1 x10E3/uL    ABS. MONOCYTES 0.4 0.1 - 0.9 x10E3/uL    ABS. EOSINOPHILS 0.1 0.0 - 0.4 x10E3/uL    ABS. BASOPHILS 0.0 0.0 - 0.2 x10E3/uL    IMMATURE GRANULOCYTES 0 %    ABS. IMM.  GRANS. 0.0 0.0 - 0.1 x10E3/uL   CVD REPORT   Result Value Ref Range    INTERPRETATION Note    AMB POC URINALYSIS DIP STICK MANUAL W/O MICRO   Result Value Ref Range    Color (UA POC) Yellow     Clarity (UA POC) Clear     Glucose (UA POC) Negative Negative    Bilirubin (UA POC) Negative Negative    Ketones (UA POC) Negative Negative    Specific gravity (UA POC) 1.015 1.001 - 1.035    Blood (UA POC) Negative Negative    pH (UA POC) 6.0 4.6 - 8.0    Protein (UA POC) Negative Negative mg/dL    Urobilinogen (UA POC) 0.2 mg/dL 0.2 - 1    Nitrites (UA POC) Negative Negative    Leukocyte esterase (UA POC) Negative Negative       ASSESSMENT and PLAN    ICD-10-CM ICD-9-CM    1. Right-sided low back pain without sciatica, unspecified chronicity M54.5 724.2 XR SPINE LUMB 2 OR 3 V      REFERRAL TO ORTHOPEDICS     reviewed diet, exercise and weight control  reviewed medications and side effects in detail  radiology results and schedule of future radiology studies reviewed with patient  Patient has myofascial low back pain. This is likely due to muscle strain. Given the persistence of symptoms we will will order the lumbar x-ray. I will have her take the Flexeril and ibuprofen 3 times a day as needed for the pain. Will have her not to lift any loads about 20 pounds while at work. We discussed physical therapy and follow-up by a spine specialist.  Patient would like to be referred to spine specialist.  This was done. I will follow-up with her in a month or sooner as needed. I have discussed the diagnosis with the patient and the intended plan of care as seen in the above orders. The patient has received an after-visit summary and questions were answered concerning future plans. I have discussed medication, side effects, and warnings with the patient in detail. The patient verbalized understanding and is in agreement with the plan of care. The patient will contact the office with any additional concerns.     Moises Haque MD

## 2019-03-01 ENCOUNTER — TELEPHONE (OUTPATIENT)
Dept: ORTHOPEDIC SURGERY | Age: 34
End: 2019-03-01

## 2019-03-01 ENCOUNTER — OFFICE VISIT (OUTPATIENT)
Dept: ORTHOPEDIC SURGERY | Age: 34
End: 2019-03-01

## 2019-03-01 VITALS
OXYGEN SATURATION: 100 % | RESPIRATION RATE: 16 BRPM | DIASTOLIC BLOOD PRESSURE: 82 MMHG | WEIGHT: 199.2 LBS | SYSTOLIC BLOOD PRESSURE: 123 MMHG | HEIGHT: 67 IN | BODY MASS INDEX: 31.27 KG/M2 | HEART RATE: 79 BPM | TEMPERATURE: 98.3 F

## 2019-03-01 DIAGNOSIS — S39.012A STRAIN OF LUMBAR REGION, INITIAL ENCOUNTER: ICD-10-CM

## 2019-03-01 DIAGNOSIS — M54.5 LOW BACK PAIN, UNSPECIFIED BACK PAIN LATERALITY, UNSPECIFIED CHRONICITY, WITH SCIATICA PRESENCE UNSPECIFIED: Primary | ICD-10-CM

## 2019-03-01 RX ORDER — METHYLPREDNISOLONE 4 MG/1
TABLET ORAL
Qty: 1 DOSE PACK | Refills: 0 | Status: SHIPPED | OUTPATIENT
Start: 2019-03-01 | End: 2019-04-02 | Stop reason: ALTCHOICE

## 2019-03-01 RX ORDER — IBUPROFEN 200 MG
TABLET ORAL
COMMUNITY
End: 2019-06-06

## 2019-03-01 NOTE — TELEPHONE ENCOUNTER
Form for [employer] faxed a form to the [facility] [place] office. It is a patient status report to be filled out. It will be in NP door.

## 2019-03-01 NOTE — PROGRESS NOTES
[facility]  [address], [address]  Phone: [phone]  Fax: [phone]        INITIAL CONSULTATION      HISTORY OF PRESENT ILLNESS:  Elizabeth Logan is a 35 y.o. female whom is referred from workers comp and Apolinar Garcia MD secondary to progressive right-sided flank pain since work injury 1/31/19. She rates her pain 8/10. She reports she was lifting a case of cottage cheese (~60 lbs) and felt a catch in her back on the left side. She kept working following the injury as she thought the pain would go away on its own. Pt denies hx of back problems prior to the injury. She denies radicular sxs at this time. Her pain is exacerbated with bending and sitting up straight for prolonged periods of time. Pt denies hx of spinal surgery, injections, or PT/chiropractor. She has treated with muscle relaxers which help her go to sleep at night and Ibuprofen with no relief. Pt is seeing Dr. Elliot Gates for left knee pain which predates her injury. Pt denies change in bowel or bladder habits. Pt denies fever, weight loss, or skin changes. PmHx tubal ligation. Note from Betina Cunningham MD dated 2/4/19 indicating patient was seen with c/o low back pain following injury 1/31/19. Strained her back while she was at work last week. Treated with muscle relaxers without relief. Treated with Ibuprofen which she did not take. She went back to work with restrictions which exacerbated her pain. Her pain was mostly left-sided at that time. Referred to me. The patient is RHD.  reviewed. Body mass index is 31.2 kg/m².     PCP: Betina Cunningham MD    Past Medical History:   Diagnosis Date    Gastritis           Past Surgical History:   Procedure Laterality Date    HX BREAST REDUCTION      HX TUBAL LIGATION           Social History     Tobacco Use    Smoking status: Current Every Day Smoker     Types: Cigarettes    Smokeless tobacco: Never Used   Substance Use Topics    Alcohol use: Yes     Comment: Occ     Work status: The patient is employed. Marital status: The patient is single. Current Outpatient Medications   Medication Sig Dispense Refill    ibuprofen (MOTRIN) 200 mg tablet Take  by mouth.  cyclobenzaprine HCl (FLEXERIL PO) Take  by mouth.  phentermine (ADIPEX-P) 37.5 mg tablet Take 1 Tab by mouth every morning. Max Daily Amount: 37.5 mg. 30 Tab 0       No Known Allergies       No family history on file. REVIEW OF SYSTEMS  Constitutional symptoms: Negative  Eyes: Negative  Ears, Nose, Throat, and Mouth: Negative  Cardiovascular: Negative  Respiratory: Negative  Genitourinary: Negative  Integumentary (Skin and/or breast): Negative  Musculoskeletal: Positive for right-sided flank pain  Extremities: Negative for edema. Endocrine/Rheumatologic: Negative  Hematologic/Lymphatic: Negative  Allergic/Immunologic: Negative  Psychiatric: Negative       PHYSICAL EXAMINATION  Visit Vitals  /82   Pulse 79   Temp 98.3 °F (36.8 °C) (Oral)   Resp 16   Ht 5' 7\" (1.702 m)   Wt 199 lb 3.2 oz (90.4 kg)   SpO2 100%   BMI 31.20 kg/m²       CONSTITUTIONAL: NAD, A&O x 3  HEART: Regular rate and rhythm  ABDOMEN: Positive bowel sounds, soft, nontender, and nondistended  LUNGS: Clear to auscultation bilaterally. SKIN: Negative for rash. RANGE OF MOTION: The patient has full passive range of motion in all four extremities. SENSATION: Sensation is intact to light touch throughout. MOTOR:   Straight Leg Raise: Negative, bilateral  Felix: Negative, bilateral  Deep tendon reflexes are 0 at the brachioradialis, biceps, and triceps bilaterally. Deep tendon reflexes are 0 at the knees and ankles bilaterally.      Shoulder AB/Flex Elbow Flex Wrist Ext Elbow Ext Wrist Flex Hand Intrin Tone   Right +4/5 +4/5 +4/5 +4/5 +4/5 +4/5 +4/5   Left +4/5 +4/5 +4/5 +4/5 +4/5 +4/5 +4/5              Hip Flex Knee Ext Knee Flex Ankle DF GTE Ankle PF Tone   Right +4/5 +4/5 +4/5 +4/5 +4/5 +4/5 +4/5   Left +4/5 +4/5 +4/5 +4/5 +4/5 +4/5 +4/5     RADIOGRAPHS  Preliminary reading of lumbar plain films:  No acute pathology identified. These are being sent out for official reading by Dr. Bong Elrdidge. ASSESSMENT   Diagnoses and all orders for this visit:    1. Low back pain, unspecified back pain laterality, unspecified chronicity, with sciatica presence unspecified  -     AMB POC XRAY, SPINE, LUMBOSACRAL; 2 O    2. Strain of lumbar region, initial encounter           IMPRESSIONS/RECOMMENDATIONS:  Patient presents today with c/o progressive right-sided flank pain since work injury on 1/31/19. Patient denies hx of back problems prior to the injury. She denies radicular sxs at this time. Upon reviewing lumbar plain films and physical examination, I do not appreciate any spinal pathology to account for her sxs. My sense is that her sxs are muscular at this time. I will start her on Medrol Dosepak. Patient instructed to resume Ibuprofen following completion of MDP. I will refer her to physical therapy with an emphasis on HEP. Multiple treatment options were discussed. OOW until f/u. Patient is neurologically intact. I will see the patient back in 1 month's time or earlier if needed. Written by Shiloh Krishnamurthy, as dictated by Greg Arambula MD  I examined the patient, reviewed and agree with the note.

## 2019-03-01 NOTE — TELEPHONE ENCOUNTER
[employer] with [employer] called to say that [employer] has approved patient for physical therapy and they will get it set up with one of their approved companies.

## 2019-03-01 NOTE — LETTER
NOTIFICATION RETURN TO WORK   
 
3/1/2019 11:43 AM 
 
Ms. Siobhan Puente [address] [facility] [address] To Whom It May Concern: 
 
Siobhan Puente is [facility]. She will remain out of work until follow up appointment 3-. If there are questions or concerns please have the patient contact our office. Sincerely, Morteza Walls MD

## 2019-03-04 ENCOUNTER — DOCUMENTATION ONLY (OUTPATIENT)
Dept: ORTHOPEDIC SURGERY | Age: 34
End: 2019-03-04

## 2019-04-02 ENCOUNTER — OFFICE VISIT (OUTPATIENT)
Dept: ORTHOPEDIC SURGERY | Age: 34
End: 2019-04-02

## 2019-04-02 VITALS
DIASTOLIC BLOOD PRESSURE: 80 MMHG | TEMPERATURE: 98.7 F | RESPIRATION RATE: 14 BRPM | WEIGHT: 199.2 LBS | BODY MASS INDEX: 31.27 KG/M2 | HEART RATE: 84 BPM | HEIGHT: 67 IN | SYSTOLIC BLOOD PRESSURE: 125 MMHG

## 2019-04-02 DIAGNOSIS — S39.012A STRAIN OF LUMBAR REGION, INITIAL ENCOUNTER: ICD-10-CM

## 2019-04-02 DIAGNOSIS — M54.50 LUMBAR SPINE PAIN: Primary | ICD-10-CM

## 2019-04-02 RX ORDER — NAPROXEN 500 MG/1
500 TABLET ORAL 2 TIMES DAILY WITH MEALS
Qty: 60 TAB | Refills: 0 | Status: SHIPPED | OUTPATIENT
Start: 2019-04-02 | End: 2019-06-06

## 2019-04-02 NOTE — LETTER
NOTIFICATION OF RETURN TO WORK / SCHOOL 
 
4/2/2019 10:19 AM 
 
Ms. Renee Hill [address] [facility] [address] Yair Sauer To Whom It May Concern: 
 
Renee Hill was under the care of [address]  today 4-2-19. Ms. Ruth Mooney is to return to work on Monday 4-8-19 with the following restrictions: No lifting greater than 20lbs. These restrictions will be re evaluated at her next appointment on 5-1-19. If you have any questions please call the office at [phone]. Sincerely, Shreya Melendez MD

## 2019-04-02 NOTE — LETTER
4/2/19 Patient: Bob Wong YOB: 1985 Date of Visit: 4/2/2019 Roslynn Kehr, MD 
[facility] [address] [place] [address] VIA In Basket Dear Roslynn Kehr, MD, Thank you for referring Ms. Maria Del Rosario Morales to [address] [place] [address] for evaluation. My notes for this consultation are attached. If you have questions, please do not hesitate to call me. I look forward to following your patient along with you. Sincerely, Aliyah Church MD

## 2019-04-02 NOTE — LETTER
NOTIFICATION OF RETURN TO WORK / SCHOOL 
 
4/2/2019 10:13 AM 
 
Ms. Monserrat Campbell [address] [facility] [address] Jose Worley To Whom It May Concern: 
 
Monserrat Campbell was under the care of [facility] today 4-2-19. Ms. Anne Miller is to return to work with the following restrictions: No lifting greater than 20lbs. If you have any questions please call the office at [phone]. Sincerely, Ana Luisa Groves MD

## 2019-04-02 NOTE — PROGRESS NOTES
[facility]  [address]  [address], [address]  Phone: [phone]  Fax: [phone]        PROGRESS NOTE      HISTORY OF PRESENT ILLNESS:  The patient is a 29 y.o. female and was seen today for follow up of  progressive right-sided flank pain since work injury 1/31/19. She reports she was lifting a case of cottage cheese (~60 lbs) and felt a catch in her back on the left side. She kept working following the injury as she thought the pain would go away on its own. Pt denies h/o back problems prior to the injury. She denies radicular sxs at this time. Her pain is exacerbated with bending and sitting up straight for prolonged periods of time. Pt denies hx of spinal surgery, injections, or PT/chiropractor. She has treated with muscle relaxers which help her go to sleep at night and Ibuprofen with no relief. Pt is seeing Dr. Lori Mitchell for left knee pain which predates her injury. Pt denies change in bowel or bladder habits. Pt denies fever, weight loss, or skin changes. The patient is RHD. PmHx tubal ligation. Note from Igor Hopkins MD dated 2/4/19 indicating patient was seen with c/o low back pain following injury 1/31/19. Strained her back while she was at work last week. Treated with muscle relaxers without relief. Treated with Ibuprofen which she did not take. She went back to work with restrictions which exacerbated her pain. Her pain was mostly left-sided at that time. Referred to me. Preliminary reading of lumbar plain films revealed: No acute pathology identified. At her last clinical appointment, patient presented with c/o progressive right-sided flank pain since work injury on 1/31/19. Patient denied hx of back problems prior to the injury. She denied radicular sxs at that time. Upon reviewing lumbar plain films and physical examination, I did not appreciate any spinal pathology to account for her sxs. My sense was that her sxs are muscular at that time.  I started her on Medrol Dosepak. Patient instructed to resume Ibuprofen following completion of MDP. I referred her to physical therapy with an emphasis on HEP. Multiple treatment options were discussed. OOW until f/u. The patient returns today with pain location and distribution remain unchanged. She rates her pain 7/10, previously 8/10. Pt is currently enrolled in PT and has only completed 2 visits due to insurance issues. She completed MDP with temporary relief. Pt denies change in bowel or bladder habits. Note from PT dated 3/28/19 indicating patient was seen with c/o low back pain without radiculopathy. Therapy notes reviewed.  reviewed. Body mass index is 31.2 kg/m². PCP: Winston Mueller MD      Past Medical History:   Diagnosis Date    Gastritis         Social History     Socioeconomic History    Marital status: SINGLE     Spouse name: Not on file    Number of children: Not on file    Years of education: Not on file    Highest education level: Not on file   Occupational History    Occupation: Unemployed   Social Needs    Financial resource strain: Not on file    Food insecurity:     Worry: Not on file     Inability: Not on file    Transportation needs:     Medical: Not on file     Non-medical: Not on file   Tobacco Use    Smoking status: Current Every Day Smoker     Types: Cigarettes    Smokeless tobacco: Never Used   Substance and Sexual Activity    Alcohol use: Yes     Comment:  Occ    Drug use: No    Sexual activity: Yes   Lifestyle    Physical activity:     Days per week: Not on file     Minutes per session: Not on file    Stress: Not on file   Relationships    Social connections:     Talks on phone: Not on file     Gets together: Not on file     Attends Gnosticism service: Not on file     Active member of club or organization: Not on file     Attends meetings of clubs or organizations: Not on file     Relationship status: Not on file    Intimate partner violence:     Fear of current or ex partner: Not on file     Emotionally abused: Not on file     Physically abused: Not on file     Forced sexual activity: Not on file   Other Topics Concern     Service No    Blood Transfusions No    Caffeine Concern No    Occupational Exposure No    Hobby Hazards No    Sleep Concern No    Stress Concern No    Weight Concern No    Special Diet No    Back Care No    Exercise No    Bike Helmet No    Seat Belt Yes    Self-Exams Yes   Social History Narrative    Not on file       Current Outpatient Medications   Medication Sig Dispense Refill    ibuprofen (MOTRIN) 200 mg tablet Take  by mouth.  cyclobenzaprine HCl (FLEXERIL PO) Take  by mouth.  phentermine (ADIPEX-P) 37.5 mg tablet Take 1 Tab by mouth every morning. Max Daily Amount: 37.5 mg. (Patient not taking: Reported on 4/2/2019) 30 Tab 0       No Known Allergies       PHYSICAL EXAMINATION    Visit Vitals  /80   Pulse 84   Temp 98.7 °F (37.1 °C) (Oral)   Resp 14   Ht 5' 7\" (1.702 m)   Wt 199 lb 3.2 oz (90.4 kg)   BMI 31.20 kg/m²       CONSTITUTIONAL: NAD, A&O x 3  SENSATION: Intact to light touch throughout  RANGE OF MOTION: The patient has full passive range of motion in all four extremities. MOTOR:  Straight Leg Raise: Negative, bilateral               Hip Flex Knee Ext Knee Flex Ankle DF GTE Ankle PF Tone   Right +4/5 +4/5 +4/5 +4/5 +4/5 +4/5 +4/5   Left +4/5 +4/5 +4/5 +4/5 +4/5 +4/5 +4/5       ASSESSMENT   Diagnoses and all orders for this visit:    1. Lumbar spine pain    2. Strain of lumbar region, initial encounter          IMPRESSION AND PLAN:  I recommend she attend PT as prescribed and complete her HEP daily following that. I provided her with an Rx for Naproxen. We did discuss blocks in the future if PT does not provide relief. I provided patient with a note to return to work with lifting restriction of 20 lbs. Patient is neurologically intact.  I will see the patient back in 1 month's time or earlier if needed. Written by Tawanna Bates, as dictated by Mitchell Katz MD  I examined the patient, reviewed and agree with the note.

## 2019-04-15 ENCOUNTER — TELEPHONE (OUTPATIENT)
Dept: ORTHOPEDIC SURGERY | Age: 34
End: 2019-04-15

## 2019-04-15 NOTE — LETTER
NOTIFICATION RETURN TO WORK / SCHOOL 
 
4/16/2019 4:20 PM 
 
Ms. Ines uSe [address] [facility] [address] To Whom It May Concern: 
 
Ines Sue is currently under the care of [facility]. She will remain out of work from today Tuesday, 4/16/19 until Monday, 4/22/19, which she will be re-evaluated at that time. If there are questions or concerns please have the patient contact our office. Sincerely, Kulwinder Lepe MD

## 2019-04-16 NOTE — TELEPHONE ENCOUNTER
Called patient and informed her that per the provider that we can do an out of work note but not until her follow up appointment with Dr. Nathanael Soto on 5/1 but we can make her an earlier appointment with one of our NP. She states she will make an appointment. An appointment was made for Monday, 4/22/19 at 9:50 am with SANCHEZ Stevens at [facility]. Patient states she will  her note tomorrow. I informed her that the note will be for today until Monday. Patient verbalized understanding and no further action needed at this time.

## 2019-04-16 NOTE — TELEPHONE ENCOUNTER
Spoke to Dr. Luba Dhillon in regards to this message and he states the patient will need to make an earlier appointment with one of our Nurse Practitioners and and we can give her an out of work note until then. Please make the appointment for sooner so she can be re-evaluated at that time.

## 2019-04-16 NOTE — TELEPHONE ENCOUNTER
Patient called again and is requesting a call back in regards to the letter she requested from [address] Patient tel. [phone].

## 2019-04-23 ENCOUNTER — TELEPHONE (OUTPATIENT)
Dept: ORTHOPEDIC SURGERY | Age: 34
End: 2019-04-23

## 2019-04-23 NOTE — LETTER
NOTIFICATION RETURN TO WORK / SCHOOL 
 
4/25/2019 4:44 PM 
 
Ms. Kristal Wilhelm [address] [facility] [address] To Whom It May Concern: 
 
Kristal Wilhelm is [facility]. She will remain out of work until her follow up appointment on 4/29/19. If there are questions or concerns please have the patient contact our office. Sincerely, Georgi Rossi MD

## 2019-04-23 NOTE — TELEPHONE ENCOUNTER
Patient called in states that she was supposed to have an appt tomorrow and forgot that she had court. Patient was r/s to 04/29/19 and she needs another work note that states she returns 04/29/19 to see doctor. Patient can be reached at [phone].

## 2019-04-25 NOTE — TELEPHONE ENCOUNTER
Called patient and verified her name and date of birth. I informed patient per the provider that we have extended her out of work note until 4/29/19 and she can pick it up from either our [facility] office or [facility] office. She states she has already spoken to her supervisor and  and they told her that she can just get the note the day of her appointment. Patient verbalized understanding. No further action needed at this time.

## 2019-05-01 ENCOUNTER — DOCUMENTATION ONLY (OUTPATIENT)
Dept: ORTHOPEDIC SURGERY | Age: 34
End: 2019-05-01

## 2019-05-01 ENCOUNTER — OFFICE VISIT (OUTPATIENT)
Dept: ORTHOPEDIC SURGERY | Age: 34
End: 2019-05-01

## 2019-05-01 VITALS
WEIGHT: 198 LBS | BODY MASS INDEX: 31.08 KG/M2 | HEART RATE: 74 BPM | OXYGEN SATURATION: 99 % | HEIGHT: 67 IN | TEMPERATURE: 98.9 F | RESPIRATION RATE: 16 BRPM | DIASTOLIC BLOOD PRESSURE: 82 MMHG | SYSTOLIC BLOOD PRESSURE: 124 MMHG

## 2019-05-01 DIAGNOSIS — S39.012A STRAIN OF LUMBAR REGION, INITIAL ENCOUNTER: ICD-10-CM

## 2019-05-01 DIAGNOSIS — M54.50 LUMBAR SPINE PAIN: Primary | ICD-10-CM

## 2019-05-01 NOTE — LETTER
NOTIFICATION OF RETURN TO WORK / SCHOOL 
 
5/1/2019 11:06 AM 
 
Ms. Roxann Franco [address] [facility] [address] [facility] [employer] To Whom It May Concern: 
 
Roxann Franco was under the care of [facility]  today 5-1-19. Ms. Adarsh Lane is to return to work on 5-6-19 with the following restrictions of: No lifting greater than 50lbs. Ms. Adarsh Lane will also be scheduled for a MRI in the near further and may need time off for that test.  If you have any questions please call the office at [phone]. Sincerely, Maegan Castañeda MD

## 2019-05-01 NOTE — LETTER
5/1/19 Patient: Monserrat Campbell YOB: 1985 Date of Visit: 5/1/2019 Bolivar Delacruz MD 
[facility]. [address] [place] [address] VIA In Basket Dear Bolivar Delacruz MD, Thank you for referring Ms. Pete Servin to [address] [place] [address] for evaluation. My notes for this consultation are attached. If you have questions, please do not hesitate to call me. I look forward to following your patient along with you. Sincerely, Ana Luisa Groves MD

## 2019-05-01 NOTE — LETTER
NOTIFICATION OF RETURN TO WORK / SCHOOL 
 
5/1/2019 10:46 AM 
 
Ms. Amber Fernandez [address] [facility] [address] [place] Cruz To Whom It May Concern: 
 
Amber Fernandez was under the care of [facility] today 5-1-19. Ms. Stefani Paula is to return to work on 5-2-19 with the following restrictions of: No lifting greater than 50lbs. If you have any questions please call the office at [phone]. Sincerely, Chiquita Da Silva MD

## 2019-05-01 NOTE — PROGRESS NOTES
[facility]  [address]   Phone: [phone]  Fax: [phone]        PROGRESS NOTE      HISTORY OF PRESENT ILLNESS:  The patient is a 29 y.o. female and was seen today for follow up of progressive right-sided flank pain since work injury 1/31/19. She reports she was lifting a case of cottage cheese (~60 lbs) and felt a catch in her back on the left side. She kept working following the injury as she thought the pain would go away on its own. Pt denies h/o back problems prior to the injury. She denies radicular sxs at this time. Her pain is exacerbated with bending and sitting up straight for prolonged periods of time. Pt denies hx of spinal surgery, injections, or PT/chiropractor. She has treated with muscle relaxers which help her go to sleep at night and Ibuprofen with no relief. She completed MDP with temporary relief. Pt is seeing Dr. Gladys Cunningham for left knee pain which predates her injury. Pt denies change in bowel or bladder habits. Pt denies fever, weight loss, or skin changes. The patient is RHD. PmHx tubal ligation. Note from Bryant Bishop MD dated 2/4/19 indicating patient was seen with c/o low back pain following injury 1/31/19. Strained her back while she was at work last week. Treated with muscle relaxers without relief. Treated with Ibuprofen which she did not take. She went back to work with restrictions which exacerbated her pain. Her pain was mostly left-sided at that time. Referred to me. Preliminary reading of lumbar plain films revealed: No acute pathology identified. At her last clinical appointment, I recommended she attend PT as prescribed and complete her HEP daily following that. I provided her with an Rx for Naproxen. We did discuss blocks in the future if PT did not provide relief. I provided patient with a note to return to work with lifting restriction of 20 lbs.             The patient returns today with pain location and distribution remain unchanged. She rates her pain 6/10, previously 7/10. According to the pt, she has only attended 3 sessions of PT. I ordered it 2 months ago and it is unclear to me what is taking so long. Pt denies change in bowel or bladder habits.  reviewed. Body mass index is 31.01 kg/m². PCP: Stephon Martines MD      Past Medical History:   Diagnosis Date    Gastritis         Social History     Socioeconomic History    Marital status: SINGLE     Spouse name: Not on file    Number of children: Not on file    Years of education: Not on file    Highest education level: Not on file   Occupational History    Occupation: Unemployed   Social Needs    Financial resource strain: Not on file    Food insecurity:     Worry: Not on file     Inability: Not on file    Transportation needs:     Medical: Not on file     Non-medical: Not on file   Tobacco Use    Smoking status: Current Every Day Smoker     Types: Cigarettes    Smokeless tobacco: Never Used   Substance and Sexual Activity    Alcohol use: Yes     Comment:  Occ    Drug use: No    Sexual activity: Yes   Lifestyle    Physical activity:     Days per week: Not on file     Minutes per session: Not on file    Stress: Not on file   Relationships    Social connections:     Talks on phone: Not on file     Gets together: Not on file     Attends Jehovah's witness service: Not on file     Active member of club or organization: Not on file     Attends meetings of clubs or organizations: Not on file     Relationship status: Not on file    Intimate partner violence:     Fear of current or ex partner: Not on file     Emotionally abused: Not on file     Physically abused: Not on file     Forced sexual activity: Not on file   Other Topics Concern     Service No    Blood Transfusions No    Caffeine Concern No    Occupational Exposure No    Hobby Hazards No    Sleep Concern No    Stress Concern No    Weight Concern No    Special Diet No  Back Care No    Exercise No    Bike Helmet No    Seat Belt Yes    Self-Exams Yes   Social History Narrative    Not on file       Current Outpatient Medications   Medication Sig Dispense Refill    naproxen (NAPROSYN) 500 mg tablet Take 1 Tab by mouth two (2) times daily (with meals). 60 Tab 0    ibuprofen (MOTRIN) 200 mg tablet Take  by mouth.  cyclobenzaprine HCl (FLEXERIL PO) Take  by mouth.  phentermine (ADIPEX-P) 37.5 mg tablet Take 1 Tab by mouth every morning. Max Daily Amount: 37.5 mg. 30 Tab 0       No Known Allergies       PHYSICAL EXAMINATION    Visit Vitals  /82   Pulse 74   Temp 98.9 °F (37.2 °C)   Resp 16   Ht 5' 7\" (1.702 m)   Wt 198 lb (89.8 kg)   SpO2 99%   BMI 31.01 kg/m²       CONSTITUTIONAL: NAD, A&O x 3  SENSATION: Intact to light touch throughout  RANGE OF MOTION: The patient has full passive range of motion in all four extremities. MOTOR:  Straight Leg Raise: Negative, bilateral               Hip Flex Knee Ext Knee Flex Ankle DF GTE Ankle PF Tone   Right +4/5 +4/5 +4/5 +4/5 +4/5 +4/5 +4/5   Left +4/5 +4/5 +4/5 +4/5 +4/5 +4/5 +4/5       ASSESSMENT   Diagnoses and all orders for this visit:    1. Lumbar spine pain    2. Strain of lumbar region, initial encounter          IMPRESSION AND PLAN:  According to the patient, she has only attended 3 sessions of PT that was ordered 2 months ago. In light of the fact that she continues to be in significant pain despite being 3 months out from her injury, I will set her up for an MRI of the lumbar spine. I advised patient to bring copies of films to next visit. Patient is neurologically intact. I will see the patient back following MRI. I have provided her with a note to return to work with a lifting limit of 50 lbs. Written by Angel Pan, as dictated by Lena Hall MD  I examined the patient, reviewed and agree with the note.

## 2019-05-01 NOTE — PROGRESS NOTES
MRI Lumbar Spine without contrast has been faxed to [facility] for approval and scheduling, [phone], fax [phone], Claim [id number].

## 2019-05-23 ENCOUNTER — ED HISTORICAL/CONVERTED ENCOUNTER (OUTPATIENT)
Dept: OTHER | Age: 34
End: 2019-05-23

## 2019-05-24 ENCOUNTER — ED HISTORICAL/CONVERTED ENCOUNTER (OUTPATIENT)
Dept: OTHER | Age: 34
End: 2019-05-24

## 2019-05-29 ENCOUNTER — TELEPHONE (OUTPATIENT)
Dept: ORTHOPEDIC SURGERY | Age: 34
End: 2019-05-29

## 2019-05-29 DIAGNOSIS — M54.50 LUMBAR SPINE PAIN: Primary | ICD-10-CM

## 2019-05-29 DIAGNOSIS — S39.012A STRAIN OF LUMBAR REGION, INITIAL ENCOUNTER: ICD-10-CM

## 2019-05-29 NOTE — TELEPHONE ENCOUNTER
Patient called with [facility] on the line, she was not able to have MRI done because of anxiety. She's requesting rx so she can reschedule MRI through [facility] and then schedule our follow up MRI review. Please advise patient when done at [phone].

## 2019-05-29 NOTE — TELEPHONE ENCOUNTER
Ok to call in valium 10 mg 1 tab po 30 mins prior to MRI #1. She will need  to and from MRI. She should go ahead and get her MRI f/u appt with Dr Aguilar Parisi or she may have to wait a long time to get in.

## 2019-06-05 ENCOUNTER — TELEPHONE (OUTPATIENT)
Dept: FAMILY MEDICINE CLINIC | Age: 34
End: 2019-06-05

## 2019-06-06 ENCOUNTER — OFFICE VISIT (OUTPATIENT)
Dept: FAMILY MEDICINE CLINIC | Age: 34
End: 2019-06-06

## 2019-06-06 VITALS
HEIGHT: 67 IN | OXYGEN SATURATION: 100 % | WEIGHT: 196 LBS | HEART RATE: 64 BPM | RESPIRATION RATE: 18 BRPM | BODY MASS INDEX: 30.76 KG/M2 | SYSTOLIC BLOOD PRESSURE: 132 MMHG | DIASTOLIC BLOOD PRESSURE: 84 MMHG | TEMPERATURE: 97.3 F

## 2019-06-06 DIAGNOSIS — M54.50 MIDLINE LOW BACK PAIN WITHOUT SCIATICA, UNSPECIFIED CHRONICITY: Primary | ICD-10-CM

## 2019-06-06 NOTE — LETTER
NOTIFICATION RETURN TO WORK  
 
6/6/2019 11:15 AM 
 
Ms. Jeevan Kraft [address] [facility] [address] To Whom It May Concern: 
 
Jeevan Kraft is currently under the care of [address] [place] [address]. She will return to work on: 06/10/2019 without restrictions. If there are questions or concerns please have the patient contact our office. Sincerely, Jeovanny Torres MD

## 2019-06-06 NOTE — PROGRESS NOTES
Chief Complaint   Patient presents with    Back Pain     on restrictions     1. Have you been to the ER, urgent care clinic since your last visit? Hospitalized since your last visit? Yes When: 05/2019  Where: now care  Reason for visit: back pain     2. Have you seen or consulted any other health care providers outside of the [address] [place] [address] since your last visit? Include any pap smears or colon screening. No     HPI  Link Baltazar comes in for f/u care. Back pain: patient has low back pain, strained her back in January and has been having pain on and off, worsened recently and was seen in  and she was given work restrictions. She has been off work since Monday this week. She would like to get back to work. She is being seen by [facility] doctor and scheduled to have MRI. She is now seeing an orthopedist for the back pain. Has done therapy. She will try to go back to work without restriction on Monday. If back pain recurs will come back for reevaluation and will be off work until she is seen by the specialist.      Past Medical History  Past Medical History:   Diagnosis Date    Gastritis        Surgical History  Past Surgical History:   Procedure Laterality Date    HX BREAST REDUCTION      HX TUBAL LIGATION          Medications  Current Outpatient Medications   Medication Sig Dispense Refill    naproxen (NAPROSYN) 500 mg tablet Take 1 Tab by mouth two (2) times daily (with meals). 60 Tab 0    ibuprofen (MOTRIN) 200 mg tablet Take  by mouth.  cyclobenzaprine HCl (FLEXERIL PO) Take  by mouth.  phentermine (ADIPEX-P) 37.5 mg tablet Take 1 Tab by mouth every morning. Max Daily Amount: 37.5 mg. 30 Tab 0       Allergies  No Known Allergies    Family History  History reviewed. No pertinent family history.     Social History  Social History     Socioeconomic History    Marital status: SINGLE     Spouse name: Not on file    Number of children: Not on file    Years of education: Not on file  Highest education level: Not on file   Occupational History    Occupation: Unemployed   Social Needs    Financial resource strain: Not on file    Food insecurity:     Worry: Not on file     Inability: Not on file   [employer] needs:     Medical: Not on file     Non-medical: Not on file   Tobacco Use    Smoking status: Current Every Day Smoker     Types: Cigarettes    Smokeless tobacco: Never Used   Substance and Sexual Activity    Alcohol use: Yes     Comment: Occ    Drug use: No    Sexual activity: Yes   Lifestyle    Physical activity:     Days per week: Not on file     Minutes per session: Not on file    Stress: Not on file   Relationships    Social connections:     Talks on phone: Not on file     Gets together: Not on file     Attends Religion service: Not on file     Active member of club or organization: Not on file     Attends meetings of clubs or organizations: Not on file     Relationship status: Not on file    Intimate partner violence:     Fear of current or ex partner: Not on file     Emotionally abused: Not on file     Physically abused: Not on file     Forced sexual activity: Not on file   Other Topics Concern     Service No    Blood Transfusions No    Caffeine Concern No    Occupational Exposure No    Hobby Hazards No    Sleep Concern No    Stress Concern No    Weight Concern No    Special Diet No    Back Care No    Exercise No    Bike Helmet No    Seat Belt Yes    Self-Exams Yes   Social History Narrative    Not on file       Review of Systems  Review of Systems - Review of all systems is negative except as noted above in the HPI.     Vital Signs  Visit Vitals  /84 (BP 1 Location: Left arm, BP Patient Position: Sitting)   Pulse 64   Temp 97.3 °F (36.3 °C) (Oral)   Resp 18   Ht 5' 7\" (1.702 m)   Wt 196 lb (88.9 kg)   LMP 05/30/2019   SpO2 100%   BMI 30.70 kg/m²         Physical Exam  Physical Examination: General appearance - oriented to person, place, and time and acyanotic, in no respiratory distress  Mental status - alert, oriented to person, place, and time, affect appropriate to mood  Chest - no tachypnea, retractions or cyanosis  Heart - S1 and S2 normal  Back exam - limited range of motion  Neurological - motor and sensory grossly normal bilaterally    Results  Results for orders placed or performed in visit on 85/50/88   METABOLIC PANEL, COMPREHENSIVE   Result Value Ref Range    Glucose 85 65 - 99 mg/dL    BUN 13 6 - 20 mg/dL    Creatinine 0.97 0.57 - 1.00 mg/dL    GFR est non-AA 78 >59 mL/min/1.73    GFR est AA 89 >59 mL/min/1.73    BUN/Creatinine ratio 13 9 - 23    Sodium 138 134 - 144 mmol/L    Potassium 4.5 3.5 - 5.2 mmol/L    Chloride 101 96 - 106 mmol/L    CO2 21 18 - 29 mmol/L    Calcium 9.2 8.7 - 10.2 mg/dL    Protein, total 6.7 6.0 - 8.5 g/dL    Albumin 4.2 3.5 - 5.5 g/dL    GLOBULIN, TOTAL 2.5 1.5 - 4.5 g/dL    A-G Ratio 1.7 1.2 - 2.2    Bilirubin, total 0.3 0.0 - 1.2 mg/dL    Alk. phosphatase 62 39 - 117 IU/L    AST (SGOT) 20 0 - 40 IU/L    ALT (SGPT) 19 0 - 32 IU/L   LIPID PANEL   Result Value Ref Range    Cholesterol, total 153 100 - 199 mg/dL    Triglyceride 139 0 - 149 mg/dL    HDL Cholesterol 66 >39 mg/dL    VLDL, calculated 28 5 - 40 mg/dL    LDL, calculated 59 0 - 99 mg/dL   CBC WITH AUTOMATED DIFF   Result Value Ref Range    WBC 7.9 3.4 - 10.8 x10E3/uL    RBC 4.23 3.77 - 5.28 x10E6/uL    HGB 12.4 11.1 - 15.9 g/dL    HCT 38.8 34.0 - 46.6 %    MCV 92 79 - 97 fL    MCH 29.3 26.6 - 33.0 pg    MCHC 32.0 31.5 - 35.7 g/dL    RDW 14.5 12.3 - 15.4 %    PLATELET 676 420 - 217 x10E3/uL    NEUTROPHILS 66 %    Lymphocytes 29 %    MONOCYTES 4 %    EOSINOPHILS 1 %    BASOPHILS 0 %    ABS. NEUTROPHILS 5.1 1.4 - 7.0 x10E3/uL    Abs Lymphocytes 2.3 0.7 - 3.1 x10E3/uL    ABS. MONOCYTES 0.4 0.1 - 0.9 x10E3/uL    ABS. EOSINOPHILS 0.1 0.0 - 0.4 x10E3/uL    ABS. BASOPHILS 0.0 0.0 - 0.2 x10E3/uL    IMMATURE GRANULOCYTES 0 %    ABS. IMM.  GRANS. 0.0 0.0 - 0.1 x10E3/uL   CVD REPORT   Result Value Ref Range    INTERPRETATION Note    AMB POC URINALYSIS DIP STICK MANUAL W/O MICRO   Result Value Ref Range    Color (UA POC) Yellow     Clarity (UA POC) Clear     Glucose (UA POC) Negative Negative    Bilirubin (UA POC) Negative Negative    Ketones (UA POC) Negative Negative    Specific gravity (UA POC) 1.015 1.001 - 1.035    Blood (UA POC) Negative Negative    pH (UA POC) 6.0 4.6 - 8.0    Protein (UA POC) Negative Negative mg/dL    Urobilinogen (UA POC) 0.2 mg/dL 0.2 - 1    Nitrites (UA POC) Negative Negative    Leukocyte esterase (UA POC) Negative Negative       ASSESSMENT and PLAN    ICD-10-CM ICD-9-CM    1. Midline low back pain without sciatica, unspecified chronicity M54.5 724.2      reviewed diet, exercise and weight control  reviewed medications and side effects in detail    I have discussed the diagnosis with the patient and the intended plan of care as seen in the above orders. The patient has received an after-visit summary and questions were answered concerning future plans. I have discussed medication, side effects, and warnings with the patient in detail. The patient verbalized understanding and is in agreement with the plan of care. The patient will contact the office with any additional concerns. Conception MD Raul    PLEASE NOTE:   This document has been produced using voice recognition software.  Unrecognized errors in transcription may be present

## 2019-06-07 RX ORDER — DIAZEPAM 10 MG/1
TABLET ORAL
Qty: 1 TAB | Refills: 0 | OUTPATIENT
Start: 2019-06-07 | End: 2020-01-13

## 2019-06-07 NOTE — TELEPHONE ENCOUNTER
Medication has been called in and patient is aware. I have also scheduled her follow up appointment for July 2nd and she is aware to bring the disk.

## 2019-06-13 ENCOUNTER — TELEPHONE (OUTPATIENT)
Dept: ORTHOPEDIC SURGERY | Age: 34
End: 2019-06-13

## 2019-06-13 NOTE — TELEPHONE ENCOUNTER
Patient called stating her worker's comp  told her to check with Dr. Paula Teixeira about when and if she can return to work and what restrictions would she have. She states she feels like she can go back to work until her 6/24/19 appointment. She is wondering if she is able to go back to work until her appointment.  Please advise patient at [phone]

## 2019-06-13 NOTE — TELEPHONE ENCOUNTER
Pt was given this return to work note by Dr. Thong Hendricks on 5/1/19:    Prema Schreiber was under the care of [address]  today 5-1-19. Ms. Jazlyn Anaya is to return to work on 5-6-19 with the following restrictions of: No lifting greater than 50lbs. Ms. Jazlyn Anaya will also be scheduled for a MRI in the near further and may need time off for that test.  If you have any questions please call the office at [phone]. The MRI would only require her take a few hours off.        Please let her  know the above

## 2019-06-14 NOTE — TELEPHONE ENCOUNTER
Patient called again saying she was seen in the er 2 weeks ago with back spasms and was put on work restrictions. They told her to contact Dr. Mickey Beltran and clinical staff because of being worker's comp and make sure she is able to go back to work with the restrictions she was put on or stay out until her next appointment. She did not tell me what hospital she went to and she didn't remember off the top of her head what restrictions she had.  Please advise patient at [phone]

## 2019-06-14 NOTE — TELEPHONE ENCOUNTER
First of all, pt did not go to an ER, she went to [facility] urgent care according to PCP note in [facility]. We do not have those records or documented restrictions given at [facility] not did she call us to inform us of this visit. PCP returned her back to work full duty on 6/6/19. We have not seen her since all this has happened so we cannot really give new restrictions on her.   She will need to keep her f/u with Dr. Albino Dave

## 2019-06-17 ENCOUNTER — OFFICE VISIT (OUTPATIENT)
Dept: ORTHOPEDIC SURGERY | Age: 34
End: 2019-06-17

## 2019-06-17 VITALS
SYSTOLIC BLOOD PRESSURE: 117 MMHG | WEIGHT: 198 LBS | HEIGHT: 67 IN | OXYGEN SATURATION: 100 % | RESPIRATION RATE: 24 BRPM | BODY MASS INDEX: 31.08 KG/M2 | TEMPERATURE: 97.3 F | HEART RATE: 90 BPM | DIASTOLIC BLOOD PRESSURE: 80 MMHG

## 2019-06-17 DIAGNOSIS — M51.36 DDD (DEGENERATIVE DISC DISEASE), LUMBAR: ICD-10-CM

## 2019-06-17 DIAGNOSIS — M43.06 LUMBAR SPONDYLOLYSIS: ICD-10-CM

## 2019-06-17 DIAGNOSIS — M48.061 SPINAL STENOSIS OF LUMBAR REGION WITHOUT NEUROGENIC CLAUDICATION: Primary | ICD-10-CM

## 2019-06-17 RX ORDER — TOPIRAMATE 25 MG/1
TABLET ORAL
Qty: 90 TAB | Refills: 1 | Status: SHIPPED | OUTPATIENT
Start: 2019-06-17 | End: 2020-01-13

## 2019-06-17 RX ORDER — CYCLOBENZAPRINE HCL 10 MG
TABLET ORAL
Refills: 0 | COMMUNITY
Start: 2019-05-26 | End: 2020-02-25 | Stop reason: SDUPTHER

## 2019-06-17 RX ORDER — DICLOFENAC SODIUM 50 MG/1
TABLET, DELAYED RELEASE ORAL
Refills: 0 | COMMUNITY
Start: 2019-06-13 | End: 2020-01-13

## 2019-06-17 RX ORDER — TIZANIDINE 4 MG/1
TABLET ORAL
Refills: 0 | COMMUNITY
Start: 2019-06-13 | End: 2020-01-13

## 2019-06-17 NOTE — PROGRESS NOTES
[facility]  [address], [place]   Phone: [phone]  Fax: [phone]        PROGRESS NOTE      HISTORY OF PRESENT ILLNESS:  The patient is a 29 y.o. female and was seen today for follow up of progressive right-sided flank pain since work injury 1/31/19. She reports she was lifting a case of cottage cheese (~60 lbs) and felt a catch in her back on the left side. She kept working following the injury as she thought the pain would go away on its own. Pt denies h/o back problems prior to the injury. She denies radicular sxs at this time. Her pain is exacerbated with bending and sitting up straight for prolonged periods of time. Pt denies hx of spinal surgery, injections, or PT/chiropractor. She has treated with muscle relaxers which help her go to sleep at night and Ibuprofen with no relief. She completed MDP with temporary relief. Pt is seeing  [facility] for left knee pain which predates her injury. Pt denies change in bowel or bladder habits. Pt denies fever, weight loss, or skin changes. The patient is RHD. PmHx tubal ligation. Note from Luz Concepcion MD dated 2/4/19 indicating patient was seen with c/o low back pain following injury 1/31/19. Strained her back while she was at work last week. Treated with muscle relaxers without relief. Treated with Ibuprofen which she did not take. She went back to work with restrictions which exacerbated her pain. Her pain was mostly left-sided at that time. Referred to me. Preliminary reading of lumbar plain films revealed: No acute pathology identified. At her last clinical appointment, according to the patient, she had only attended 3 sessions of PT that was ordered 2 months ago. In light of the fact that she continued to be in significant pain despite being 3 months out from her injury, I set her up for an MRI of the lumbar spine. The patient returns today with c/o low back pain at roughly the L4 level.  She rates her pain 7/10, previously 6/10. Pt additionally endorses bilateral knee pain at this time. Pt denies change in bowel or bladder habits. Note from Caron Arriaza MD dated 6/6/19 indicating patient was seen with c/o low back pain. According to the note, pt was seen in UC due to increase in pain and was put back on work restrictions. Lumbar spine MRI dated 6/10/19 reviewed. Per report, multilevel degenerative changes, most pronounced at L3 through S1. At least moderate canal and bilateral recess stenosis L3-4. Mild-to-moderate bilateral neural foraminal stenosis. Moderate to severe canal stenosis at L4-5. Mild-to-moderate bilateral neural foraminal stenosis at this level. Mild-to-moderate right and mild left neural foraminal stenosis L5-S1. Mild canal stenosis also noted at this level.  reviewed. Body mass index is 31.01 kg/m². PCP: Caron Arriaza MD      Past Medical History:   Diagnosis Date    Gastritis         Social History     Socioeconomic History    Marital status: SINGLE     Spouse name: Not on file    Number of children: Not on file    Years of education: Not on file    Highest education level: Not on file   Occupational History    Occupation: Unemployed   Social Needs    Financial resource strain: Not on file    Food insecurity:     Worry: Not on file     Inability: Not on file    Transportation needs:     Medical: Not on file     Non-medical: Not on file   Tobacco Use    Smoking status: Current Every Day Smoker     Types: Cigarettes    Smokeless tobacco: Never Used   Substance and Sexual Activity    Alcohol use: Yes     Comment:  Occ    Drug use: No    Sexual activity: Yes   Lifestyle    Physical activity:     Days per week: Not on file     Minutes per session: Not on file    Stress: Not on file   Relationships    Social connections:     Talks on phone: Not on file     Gets together: Not on file     Attends Gnosticism service: Not on file     Active member of club or organization: Not on file     Attends meetings of clubs or organizations: Not on file     Relationship status: Not on file    Intimate partner violence:     Fear of current or ex partner: Not on file     Emotionally abused: Not on file     Physically abused: Not on file     Forced sexual activity: Not on file   Other Topics Concern     Service No    Blood Transfusions No    Caffeine Concern No    Occupational Exposure No    Hobby Hazards No    Sleep Concern No    Stress Concern No    Weight Concern No    Special Diet No    Back Care No    Exercise No    Bike Helmet No    Seat Belt Yes    Self-Exams Yes   Social History Narrative    Not on file       Current Outpatient Medications   Medication Sig Dispense Refill    cyclobenzaprine (FLEXERIL) 10 mg tablet TAKE 1 TABLET BY MOUTH EVERY DAY AT BEDTIME AS NEEDED FOR PAIN. CAUTION SEDATION  0    diclofenac EC (VOLTAREN) 50 mg EC tablet TAKE 1 TABLET BY MOUTH THREE TIMES A DAY AS NEEDED FOR PAIN  0    tiZANidine (ZANAFLEX) 4 mg tablet TAKE 1 TABLET BY MOUTH THREE TIMES A DAY AS NEEDED FOR SPASMS  0    diazePAM (VALIUM) 10 mg tablet Take 1 tab PO 30 minutes prior to MRI. 1 Tab 0       No Known Allergies       PHYSICAL EXAMINATION    Visit Vitals  /80   Pulse 90   Temp 97.3 °F (36.3 °C)   Resp 24   Ht 5' 7\" (1.702 m)   Wt 198 lb (89.8 kg)   LMP 05/30/2019   SpO2 100%   BMI 31.01 kg/m²       CONSTITUTIONAL: NAD, A&O x 3  SENSATION: Intact to light touch throughout  RANGE OF MOTION: The patient has full passive range of motion in all four extremities. MOTOR:  Straight Leg Raise: Negative, bilateral               Hip Flex Knee Ext Knee Flex Ankle DF GTE Ankle PF Tone   Right +4/5 +4/5 +4/5 +4/5 +4/5 +4/5 +4/5   Left +4/5 +4/5 +4/5 +4/5 +4/5 +4/5 +4/5       ASSESSMENT   Diagnoses and all orders for this visit:    1. Spinal stenosis of lumbar region without neurogenic claudication    2. Lumbar spondylolysis    3.  DDD (degenerative disc disease), lumbar        IMPRESSION AND PLAN:  The patient returns today with c/o low back pain at roughly the L4 level. I will try her on Topamax 75 mg qhs. The risks, benefits, and potential side effects of this medication were discussed. Patient understands and wishes to proceed. Patient advised to call the office if intolerant to new medication. I will refer her back to physical therapy with an emphasis on HEP. Patient is not interested in surgical intervention or lumbar blocks at this time. Patient is neurologically intact. I will see the patient back in 1 month's time or earlier if needed. I provided her with a note to return to work with a lifting restriction of 20 lbs. Written by Kalea Horn, as dictated by Greg Beard MD  I examined the patient, reviewed and agree with the note.

## 2019-06-17 NOTE — PROGRESS NOTES
1. Have you been to the ER, urgent care clinic since your last visit? Hospitalized since your last visit? Yes When: 5/24/19, 5/25/19 and 6/2/19 Urgent care [facility]     2. Have you seen or consulted any other health care providers outside of the [address] [place] [address] since your last visit? Include any pap smears or colon screening.  Yes When: 5/24/19, 5/25/19 and 6/2/19 LBP

## 2019-07-22 ENCOUNTER — OFFICE VISIT (OUTPATIENT)
Dept: ORTHOPEDIC SURGERY | Age: 34
End: 2019-07-22

## 2019-07-22 VITALS
SYSTOLIC BLOOD PRESSURE: 124 MMHG | DIASTOLIC BLOOD PRESSURE: 76 MMHG | OXYGEN SATURATION: 100 % | HEART RATE: 79 BPM | WEIGHT: 202 LBS | TEMPERATURE: 98.9 F | BODY MASS INDEX: 31.71 KG/M2 | HEIGHT: 67 IN

## 2019-07-22 DIAGNOSIS — M48.061 SPINAL STENOSIS OF LUMBAR REGION WITHOUT NEUROGENIC CLAUDICATION: Primary | ICD-10-CM

## 2019-07-22 DIAGNOSIS — M43.06 LUMBAR SPONDYLOLYSIS: ICD-10-CM

## 2019-07-22 DIAGNOSIS — M51.36 DDD (DEGENERATIVE DISC DISEASE), LUMBAR: ICD-10-CM

## 2019-07-22 RX ORDER — TOPIRAMATE 25 MG/1
TABLET ORAL
Qty: 180 TAB | Refills: 1 | Status: SHIPPED | OUTPATIENT
Start: 2019-07-22 | End: 2020-01-13

## 2019-07-22 NOTE — LETTER
NOTIFICATION OF RETURN TO WORK / SCHOOL 
 
7/22/2019 10:16 AM 
 
Ms. Marie Gonzalez [address] [facility] [address] [facility] To Whom It May Concern: 
 
Marie Gonzalez is under the care of [address] [place] [address] [facility]. .  
Ms. Mode Roldan is to return to work with the 20lb weight restrictions If there are questions or concerns please have the patient contact our office. Sincerely, Sonny Lawton MD

## 2019-07-22 NOTE — PROGRESS NOTES
[facility]  [address], [place] [address]  Phone: [phone]  Fax: [phone]        PROGRESS NOTE      HISTORY OF PRESENT ILLNESS:  The patient is a 29 y.o. female and was seen today for follow up of low back pain at roughly the L4 level. Additionally endorsed bilateral knee pain at that time. Initially had c/o progressive right-sided flank pain since work injury 1/31/19. She reports she was lifting a case of cottage cheese (~60 lbs) and felt a catch in her back on the left side. She kept working following the injury as she thought the pain would go away on its own. Pt denies h/o back problems prior to the injury. She denies radicular sxs at this time. Her pain is exacerbated with bending and sitting up straight for prolonged periods of time. Pt denies hx of spinal surgery, injections, or PT/chiropractor. She has treated with muscle relaxers which help her go to sleep at night and Ibuprofen with no relief. She completed MDP with temporary relief. Pt is seeing Dr. Mckeon Session for left knee pain which predates her injury. Pt denies change in bowel or bladder habits. Pt denies fever, weight loss, or skin changes. The patient is RHD. PmHx tubal ligation. Note from Cortney Villalba MD dated 2/4/19 indicating patient was seen with c/o low back pain following injury 1/31/19. Strained her back while she was at work last week. Treated with muscle relaxers without relief. Treated with Ibuprofen which she did not take. She went back to work with restrictions which exacerbated her pain. Her pain was mostly left-sided at that time. Referred to me. Note from Cortney Vlilalba MD dated 6/6/19 indicating patient was seen with c/o low back pain. According to the note, pt was seen in UC due to increase in pain and was put back on work restrictions. Preliminary reading of lumbar plain films revealed: No acute pathology identified. Lumbar spine MRI dated 6/10/19 reviewed.  Per report, multilevel degenerative changes, most pronounced at L3 through S1. At least moderate canal and bilateral recess stenosis L3-4. Mild-to-moderate bilateral neural foraminal stenosis. Moderate to severe canal stenosis at L4-5. Mild-to-moderate bilateral neural foraminal stenosis at this level. Mild-to-moderate right and mild left neural foraminal stenosis L5-S1. Mild canal stenosis also noted at this level. At her last clinical appointment, patient returned today with c/o low back pain at roughly the L4 level. I tried her on Topamax 75 mg qhs. I referred her back to physical therapy with an emphasis on HEP. Patient was not interested in surgical intervention or lumbar blocks at this time. I provided her with a note to return to work with a lifting restriction of 20 lbs. The patient returns today with pain location and distribution remain unchanged. She rates her pain 5/10, previously 7/10. Pt denies change in bowel or bladder habits. Pt is tolerating Topamax 75 mg qhs with good relief. Pt did not return to PT due to not having a  for her kids. She is performing her HEP daily. Pt denies change in bowel or bladder habits.  reviewed. Body mass index is 31.64 kg/m².       PCP: Jewell Quintero MD      Past Medical History:   Diagnosis Date    Gastritis         Social History     Socioeconomic History    Marital status: SINGLE     Spouse name: Not on file    Number of children: Not on file    Years of education: Not on file    Highest education level: Not on file   Occupational History    Occupation: Unemployed   Social Needs    Financial resource strain: Not on file    Food insecurity:     Worry: Not on file     Inability: Not on file    Transportation needs:     Medical: Not on file     Non-medical: Not on file   Tobacco Use    Smoking status: Current Every Day Smoker     Types: Cigarettes    Smokeless tobacco: Never Used   Substance and Sexual Activity    Alcohol use: Yes     Comment: Occ    Drug use: No    Sexual activity: Yes   Lifestyle    Physical activity:     Days per week: Not on file     Minutes per session: Not on file    Stress: Not on file   Relationships    Social connections:     Talks on phone: Not on file     Gets together: Not on file     Attends Confucianism service: Not on file     Active member of club or organization: Not on file     Attends meetings of clubs or organizations: Not on file     Relationship status: Not on file    Intimate partner violence:     Fear of current or ex partner: Not on file     Emotionally abused: Not on file     Physically abused: Not on file     Forced sexual activity: Not on file   Other Topics Concern     Service No    Blood Transfusions No    Caffeine Concern No    Occupational Exposure No    Hobby Hazards No    Sleep Concern No    Stress Concern No    Weight Concern No    Special Diet No    Back Care No    Exercise No    Bike Helmet No    Seat Belt Yes    Self-Exams Yes   Social History Narrative    Not on file       Current Outpatient Medications   Medication Sig Dispense Refill    topiramate (TOPAMAX) 25 mg tablet 3 tabs PO QHS 90 Tab 1    cyclobenzaprine (FLEXERIL) 10 mg tablet TAKE 1 TABLET BY MOUTH EVERY DAY AT BEDTIME AS NEEDED FOR PAIN. CAUTION SEDATION  0    diclofenac EC (VOLTAREN) 50 mg EC tablet TAKE 1 TABLET BY MOUTH THREE TIMES A DAY AS NEEDED FOR PAIN  0    tiZANidine (ZANAFLEX) 4 mg tablet TAKE 1 TABLET BY MOUTH THREE TIMES A DAY AS NEEDED FOR SPASMS  0    diazePAM (VALIUM) 10 mg tablet Take 1 tab PO 30 minutes prior to MRI.  1 Tab 0       No Known Allergies       PHYSICAL EXAMINATION    Visit Vitals  /76 (BP 1 Location: Left arm, BP Patient Position: Sitting)   Pulse 79   Temp 98.9 °F (37.2 °C) (Oral)   Ht 5' 7\" (1.702 m)   Wt 202 lb (91.6 kg)   SpO2 100%   BMI 31.64 kg/m²       CONSTITUTIONAL: NAD, A&O x 3  SENSATION: Intact to light touch throughout  RANGE OF MOTION: The patient has full passive range of motion in all four extremities. MOTOR:  Straight Leg Raise: Negative, bilateral               Hip Flex Knee Ext Knee Flex Ankle DF GTE Ankle PF Tone   Right +4/5 +4/5 +4/5 +4/5 +4/5 +4/5 +4/5   Left +4/5 +4/5 +4/5 +4/5 +4/5 +4/5 +4/5       ASSESSMENT   Diagnoses and all orders for this visit:    1. Spinal stenosis of lumbar region without neurogenic claudication    2. Lumbar spondylolysis    3. DDD (degenerative disc disease), lumbar          IMPRESSION AND PLAN:  I will increase her dose of Topamax to 75 mg qam and75 mg qhs. Patient advised to call the office if intolerant to higher dose. Consideration was given for blocks if medication does not provide relief. I recommend she continue to perform her HEP daily. I provided her with a note to work with a lifting restriction of 20 lbs. Patient is neurologically intact. I will see the patient back in 6 week's time or earlier if needed. Written by Michael Perez, as dictated by Pamela Malhotra MD  I examined the patient, reviewed and agree with the note.

## 2019-07-22 NOTE — LETTER
7/22/19 Patient: Ham Carson YOB: 1985 Date of Visit: 7/22/2019 Nestor Banks MD 
[facility]. [address] Care [address] [place] [address] VIA In Basket Dear Nestor Banks MD, Thank you for referring Ms. Jignesh Pritchett to [address] [place] [address] Drive for evaluation. My notes for this consultation are attached. If you have questions, please do not hesitate to call me. I look forward to following your patient along with you. Sincerely, Reny Logan MD

## 2019-07-29 DIAGNOSIS — S39.012A STRAIN OF LUMBAR REGION, INITIAL ENCOUNTER: ICD-10-CM

## 2019-07-29 DIAGNOSIS — M54.50 LUMBAR SPINE PAIN: ICD-10-CM

## 2020-01-13 ENCOUNTER — OFFICE VISIT (OUTPATIENT)
Dept: FAMILY MEDICINE CLINIC | Age: 35
End: 2020-01-13

## 2020-01-13 VITALS
RESPIRATION RATE: 16 BRPM | HEART RATE: 76 BPM | WEIGHT: 200.6 LBS | HEIGHT: 67 IN | SYSTOLIC BLOOD PRESSURE: 133 MMHG | BODY MASS INDEX: 31.48 KG/M2 | TEMPERATURE: 97.8 F | DIASTOLIC BLOOD PRESSURE: 83 MMHG

## 2020-01-13 DIAGNOSIS — E66.9 OBESITY (BMI 30-39.9): ICD-10-CM

## 2020-01-13 DIAGNOSIS — M54.50 MIDLINE LOW BACK PAIN WITHOUT SCIATICA, UNSPECIFIED CHRONICITY: Primary | ICD-10-CM

## 2020-01-13 NOTE — PATIENT INSTRUCTIONS
Body Mass Index: Care Instructions  Your Care Instructions    Body mass index (BMI) can help you see if your weight is raising your risk for health problems. It uses a formula to compare how much you weigh with how tall you are. · A BMI lower than 18.5 is considered underweight. · A BMI between 18.5 and 24.9 is considered healthy. · A BMI between 25 and 29.9 is considered overweight. A BMI of 30 or higher is considered obese. If your BMI is in the normal range, it means that you have a lower risk for weight-related health problems. If your BMI is in the overweight or obese range, you may be at increased risk for weight-related health problems, such as high blood pressure, heart disease, stroke, arthritis or joint pain, and diabetes. If your BMI is in the underweight range, you may be at increased risk for health problems such as fatigue, lower protection (immunity) against illness, muscle loss, bone loss, hair loss, and hormone problems. BMI is just one measure of your risk for weight-related health problems. You may be at higher risk for health problems if you are not active, you eat an unhealthy diet, or you drink too much alcohol or use tobacco products. Follow-up care is a key part of your treatment and safety. Be sure to make and go to all appointments, and call your doctor if you are having problems. It's also a good idea to know your test results and keep a list of the medicines you take. How can you care for yourself at home? · Practice healthy eating habits. This includes eating plenty of fruits, vegetables, whole grains, lean protein, and low-fat dairy. · If your doctor recommends it, get more exercise. Walking is a good choice. Bit by bit, increase the amount you walk every day. Try for at least 30 minutes on most days of the week. · Do not smoke. Smoking can increase your risk for health problems. If you need help quitting, talk to your doctor about stop-smoking programs and medicines. These can increase your chances of quitting for good. · Limit alcohol to 2 drinks a day for men and 1 drink a day for women. Too much alcohol can cause health problems. If you have a BMI higher than 25  · Your doctor may do other tests to check your risk for weight-related health problems. This may include measuring the distance around your waist. A waist measurement of more than 40 inches in men or 35 inches in women can increase the risk of weight-related health problems. · Talk with your doctor about steps you can take to stay healthy or improve your health. You may need to make lifestyle changes to lose weight and stay healthy, such as changing your diet and getting regular exercise. If you have a BMI lower than 18.5  · Your doctor may do other tests to check your risk for health problems. · Talk with your doctor about steps you can take to stay healthy or improve your health. You may need to make lifestyle changes to gain or maintain weight and stay healthy, such as getting more healthy foods in your diet and doing exercises to build muscle. Where can you learn more? Go to http://kvng-carla.info/. Enter S176 in the search box to learn more about \"Body Mass Index: Care Instructions. \"  Current as of: October 13, 2016  Content Version: 11.4  © 0366-7167 [employer]wise[employer]. Care instructions adapted under license by [employer] (which disclaims liability or warranty for this information). If you have questions about a medical condition or this instruction, always ask your healthcare professional. [address] any warranty or liability for your use of this information.

## 2020-01-21 ENCOUNTER — TELEPHONE (OUTPATIENT)
Dept: FAMILY MEDICINE CLINIC | Age: 35
End: 2020-01-21

## 2020-01-21 NOTE — TELEPHONE ENCOUNTER
Patient called regarding FMLA paperwork. She states it was filled out, but #5 was incomplete. She needs this filled out ASAP, as it is due tomorrow.

## 2020-01-23 ENCOUNTER — TELEPHONE (OUTPATIENT)
Dept: FAMILY MEDICINE CLINIC | Age: 35
End: 2020-01-23

## 2020-01-23 NOTE — TELEPHONE ENCOUNTER
Patient called to speak with nurse regarding FMLA paperwork. She states 2 things were incomplete. She is at work and goes on break at noon.

## 2020-01-27 ENCOUNTER — TELEPHONE (OUTPATIENT)
Dept: FAMILY MEDICINE CLINIC | Age: 35
End: 2020-01-27

## 2020-01-27 NOTE — TELEPHONE ENCOUNTER
Patient called to inform Dr Serenity Bae that the [facility] paperwork that he made the corrections on needs his initials by the corrections and, also needs a cover sheet to show where it is coming from. Patient stated to call Gerardo Emerson at [phone] and go through the prompter to get to the rep that is requesting this info. She did not have a specific name to give that was requesting the info. Also stated this form needs to be completed by tomorrow 01/28 please be advised.  Contact pt for any questions on this matter or to clarify

## 2020-02-11 ENCOUNTER — OFFICE VISIT (OUTPATIENT)
Dept: FAMILY MEDICINE CLINIC | Age: 35
End: 2020-02-11

## 2020-02-11 VITALS
HEIGHT: 67 IN | SYSTOLIC BLOOD PRESSURE: 119 MMHG | WEIGHT: 201 LBS | HEART RATE: 77 BPM | RESPIRATION RATE: 16 BRPM | DIASTOLIC BLOOD PRESSURE: 74 MMHG | BODY MASS INDEX: 31.55 KG/M2 | TEMPERATURE: 98.5 F

## 2020-02-11 DIAGNOSIS — M79.601 PAIN IN INFERIOR RIGHT UPPER EXTREMITY: ICD-10-CM

## 2020-02-11 DIAGNOSIS — M25.511 ACUTE PAIN OF RIGHT SHOULDER: Primary | ICD-10-CM

## 2020-02-11 RX ORDER — DICLOFENAC SODIUM 50 MG/1
50 TABLET, DELAYED RELEASE ORAL
Qty: 60 TAB | Refills: 1 | Status: SHIPPED | OUTPATIENT
Start: 2020-02-11 | End: 2020-02-25

## 2020-02-11 NOTE — PROGRESS NOTES
Luster Spurling presents today for   Chief Complaint   Patient presents with    Arm Pain     stiffness  (Rt arm)  X 1 month         Is someone accompanying this pt? no    Is the patient using any DME equipment during OV? no    Depression Screening:  3 most recent PHQ Screens 2/11/2020   Little interest or pleasure in doing things Not at all   Feeling down, depressed, irritable, or hopeless Not at all   Total Score PHQ 2 0       Learning Assessment:  No flowsheet data found. Abuse Screening:  No flowsheet data found. Fall Risk  No flowsheet data found. Health Maintenance reviewed and discussed and ordered per Provider. Health Maintenance Due   Topic Date Due    Pneumococcal 0-64 years (1 of 1 - PPSV23) 03/19/1991    PAP AKA CERVICAL CYTOLOGY  03/29/2020   . Coordination of Care:  1. Have you been to the ER, urgent care clinic since your last visit? Hospitalized since your last visit? Yes  Muscle Spasms  Veloscity and Obici x 1 month ago    2. Have you seen or consulted any other health care providers outside of the [address] [place] [address] since your last visit? Include any pap smears or colon screening.  no

## 2020-02-11 NOTE — LETTER
NOTIFICATION RETURN TO WORK 
 
2/11/2020 11:21 AM 
 
Ms. Emilie Collazo [phone] [facility] [address] To Whom It May Concern: 
 
Emilie Collazo is currently under the care of [address]. She will return to work on 02/17/2020. If there are questions or concerns please have the patient contact our office. Sincerely, Corby Oneal MD

## 2020-02-11 NOTE — PROGRESS NOTES
HPI  Caprice Burton comes in for acute care. Right arm pain: pain right arm and shoulder with limitation on overhead motions. No h/o trauma. Will do xray right shoulder and refer to orthopedist for evaluation. Will give diclofenac 50 mg BID as needed for pain. Past Medical History  Past Medical History:   Diagnosis Date    Gastritis        Surgical History  Past Surgical History:   Procedure Laterality Date    HX BREAST REDUCTION      HX TUBAL LIGATION          Medications  Current Outpatient Medications   Medication Sig Dispense Refill    cyclobenzaprine (FLEXERIL) 10 mg tablet TAKE 1 TABLET BY MOUTH EVERY DAY AT BEDTIME AS NEEDED FOR PAIN. CAUTION SEDATION  0       Allergies  No Known Allergies    Family History  History reviewed. No pertinent family history. Social History  Social History     Socioeconomic History    Marital status: SINGLE     Spouse name: Not on file    Number of children: Not on file    Years of education: Not on file    Highest education level: Not on file   Occupational History    Occupation: Unemployed   Social Needs    Financial resource strain: Not on file    Food insecurity:     Worry: Not on file     Inability: Not on file    Transportation needs:     Medical: Not on file     Non-medical: Not on file   Tobacco Use    Smoking status: Current Every Day Smoker     Types: Cigarettes    Smokeless tobacco: Never Used   Substance and Sexual Activity    Alcohol use: Yes     Comment:  Occ    Drug use: No    Sexual activity: Yes     Birth control/protection: Surgical   Lifestyle    Physical activity:     Days per week: Not on file     Minutes per session: Not on file    Stress: Not on file   Relationships    Social connections:     Talks on phone: Not on file     Gets together: Not on file     Attends Druze service: Not on file     Active member of club or organization: Not on file     Attends meetings of clubs or organizations: Not on file     Relationship status: Not on file    Intimate partner violence:     Fear of current or ex partner: Not on file     Emotionally abused: Not on file     Physically abused: Not on file     Forced sexual activity: Not on file   Other Topics Concern     Service No    Blood Transfusions No    Caffeine Concern No    Occupational Exposure No    Hobby Hazards No    Sleep Concern No    Stress Concern No    Weight Concern No    Special Diet No    Back Care No    Exercise No    Bike Helmet No    Seat Belt Yes    Self-Exams Yes   Social History Narrative    Not on file       Review of Systems  Review of Systems - Review of all systems is negative except as noted above in the HPI. Vital Signs  Visit Vitals  /74 (BP 1 Location: Left arm, BP Patient Position: Sitting)   Pulse 77   Temp 98.5 °F (36.9 °C) (Temporal)   Resp 16   Ht 5' 7\" (1.702 m)   Wt 201 lb (91.2 kg)   LMP 02/06/2020 (Exact Date)   BMI 31.48 kg/m²         Physical Exam  Physical Examination: General appearance - alert, well appearing, and in no distress, oriented to person, place, and time and overweight  Mental status - alert, oriented to person, place, and time  Chest - clear to auscultation, no wheezes, rales or rhonchi, symmetric air entry  Heart - normal rate and regular rhythm, S1 and S2 normal  Neurological - neck supple without rigidity  Musculoskeletal -right shoulder pain with discomfort in overhead motions. Pain mainly at the [facility] joint area posteriorly.   Extremities - intact peripheral pulses    Results  Results for orders placed or performed in visit on 20/72/32   METABOLIC PANEL, COMPREHENSIVE   Result Value Ref Range    Glucose 85 65 - 99 mg/dL    BUN 13 6 - 20 mg/dL    Creatinine 0.97 0.57 - 1.00 mg/dL    GFR est non-AA 78 >59 mL/min/1.73    GFR est AA 89 >59 mL/min/1.73    BUN/Creatinine ratio 13 9 - 23    Sodium 138 134 - 144 mmol/L    Potassium 4.5 3.5 - 5.2 mmol/L    Chloride 101 96 - 106 mmol/L    CO2 21 18 - 29 mmol/L    Calcium 9.2 8.7 - 10.2 mg/dL    Protein, total 6.7 6.0 - 8.5 g/dL    Albumin 4.2 3.5 - 5.5 g/dL    GLOBULIN, TOTAL 2.5 1.5 - 4.5 g/dL    A-G Ratio 1.7 1.2 - 2.2    Bilirubin, total 0.3 0.0 - 1.2 mg/dL    Alk. phosphatase 62 39 - 117 IU/L    AST (SGOT) 20 0 - 40 IU/L    ALT (SGPT) 19 0 - 32 IU/L   LIPID PANEL   Result Value Ref Range    Cholesterol, total 153 100 - 199 mg/dL    Triglyceride 139 0 - 149 mg/dL    HDL Cholesterol 66 >39 mg/dL    VLDL, calculated 28 5 - 40 mg/dL    LDL, calculated 59 0 - 99 mg/dL   CBC WITH AUTOMATED DIFF   Result Value Ref Range    WBC 7.9 3.4 - 10.8 x10E3/uL    RBC 4.23 3.77 - 5.28 x10E6/uL    HGB 12.4 11.1 - 15.9 g/dL    HCT 38.8 34.0 - 46.6 %    MCV 92 79 - 97 fL    MCH 29.3 26.6 - 33.0 pg    MCHC 32.0 31.5 - 35.7 g/dL    RDW 14.5 12.3 - 15.4 %    PLATELET 263 650 - 509 x10E3/uL    NEUTROPHILS 66 %    Lymphocytes 29 %    MONOCYTES 4 %    EOSINOPHILS 1 %    BASOPHILS 0 %    ABS. NEUTROPHILS 5.1 1.4 - 7.0 x10E3/uL    Abs Lymphocytes 2.3 0.7 - 3.1 x10E3/uL    ABS. MONOCYTES 0.4 0.1 - 0.9 x10E3/uL    ABS. EOSINOPHILS 0.1 0.0 - 0.4 x10E3/uL    ABS. BASOPHILS 0.0 0.0 - 0.2 x10E3/uL    IMMATURE GRANULOCYTES 0 %    ABS. IMM. GRANS. 0.0 0.0 - 0.1 x10E3/uL   CVD REPORT   Result Value Ref Range    INTERPRETATION Note    AMB POC URINALYSIS DIP STICK MANUAL W/O MICRO   Result Value Ref Range    Color (UA POC) Yellow     Clarity (UA POC) Clear     Glucose (UA POC) Negative Negative    Bilirubin (UA POC) Negative Negative    Ketones (UA POC) Negative Negative    Specific gravity (UA POC) 1.015 1.001 - 1.035    Blood (UA POC) Negative Negative    pH (UA POC) 6.0 4.6 - 8.0    Protein (UA POC) Negative Negative mg/dL    Urobilinogen (UA POC) 0.2 mg/dL 0.2 - 1    Nitrites (UA POC) Negative Negative    Leukocyte esterase (UA POC) Negative Negative       ASSESSMENT and PLAN    ICD-10-CM ICD-9-CM    1.  Acute pain of right shoulder M25.511 719.41 XR SHOULDER RT AP/LAT MIN 2 V      diclofenac EC (VOLTAREN) 50 mg EC tablet      REFERRAL TO ORTHOPEDICS   2. Pain in inferior right upper extremity M79.601 729.5 XR SHOULDER RT AP/LAT MIN 2 V      diclofenac EC (VOLTAREN) 50 mg EC tablet      REFERRAL TO ORTHOPEDICS     reviewed diet, exercise and weight control  reviewed medications and side effects in detail  radiology results and schedule of future radiology studies reviewed with patient    I have discussed the diagnosis with the patient and the intended plan of care as seen in the above orders. The patient has received an after-visit summary and questions were answered concerning future plans. I have discussed medication, side effects, and warnings with the patient in detail. The patient verbalized understanding and is in agreement with the plan of care. The patient will contact the office with any additional concerns. Humble Warren MD    PLEASE NOTE:   This document has been produced using voice recognition software.  Unrecognized errors in transcription may be present

## 2020-02-17 ENCOUNTER — OFFICE VISIT (OUTPATIENT)
Dept: FAMILY MEDICINE CLINIC | Age: 35
End: 2020-02-17

## 2020-02-17 VITALS
HEIGHT: 67 IN | TEMPERATURE: 98.6 F | OXYGEN SATURATION: 100 % | WEIGHT: 200.8 LBS | SYSTOLIC BLOOD PRESSURE: 133 MMHG | HEART RATE: 87 BPM | BODY MASS INDEX: 31.52 KG/M2 | RESPIRATION RATE: 16 BRPM | DIASTOLIC BLOOD PRESSURE: 80 MMHG

## 2020-02-17 DIAGNOSIS — M25.511 RIGHT SHOULDER PAIN, UNSPECIFIED CHRONICITY: Primary | ICD-10-CM

## 2020-02-17 NOTE — PROGRESS NOTES
Chief Complaint   Patient presents with    Arm Pain     no improvement      1. Have you been to the ER, urgent care clinic since your last visit? Hospitalized since your last visit? No    2. Have you seen or consulted any other health care providers outside of the [address] [place] [address] since your last visit? Include any pap smears or colon screening. No     HPI  [facility] comes in for f/u care. She has right arm and shoulder strain. Xray done was negative for fracture or dislocation. She has been referred to see the orthopedist and I will give her number to call to set up the appointment. Needs release back to work with restrictions. She has been off work since last week. She will return tomorrow. She should avoid doing overhead motions and is restricted to lifting below 10 pounds for a week. She will f/u with the orthopedist.  She has diclofenac that she will take as needed for pain and inflammation. Past Medical History  Past Medical History:   Diagnosis Date    Gastritis        Surgical History  Past Surgical History:   Procedure Laterality Date    HX BREAST REDUCTION      HX TUBAL LIGATION          Medications  Current Outpatient Medications   Medication Sig Dispense Refill    diclofenac EC (VOLTAREN) 50 mg EC tablet Take 1 Tab by mouth two (2) times daily as needed for Pain. 60 Tab 1    cyclobenzaprine (FLEXERIL) 10 mg tablet TAKE 1 TABLET BY MOUTH EVERY DAY AT BEDTIME AS NEEDED FOR PAIN. CAUTION SEDATION  0       Allergies  No Known Allergies    Family History  History reviewed. No pertinent family history.     Social History  Social History     Socioeconomic History    Marital status: SINGLE     Spouse name: Not on file    Number of children: Not on file    Years of education: Not on file    Highest education level: Not on file   Occupational History    Occupation: Unemployed   Social Needs    Financial resource strain: Not on file    Food insecurity:     Worry: Not on file Inability: Not on file    Transportation needs:     Medical: Not on file     Non-medical: Not on file   Tobacco Use    Smoking status: Current Every Day Smoker     Types: Cigarettes    Smokeless tobacco: Never Used   Substance and Sexual Activity    Alcohol use: Yes     Comment: Occ    Drug use: No    Sexual activity: Yes     Birth control/protection: Surgical   Lifestyle    Physical activity:     Days per week: Not on file     Minutes per session: Not on file    Stress: Not on file   Relationships    Social connections:     Talks on phone: Not on file     Gets together: Not on file     Attends Pentecostalism service: Not on file     Active member of club or organization: Not on file     Attends meetings of clubs or organizations: Not on file     Relationship status: Not on file    Intimate partner violence:     Fear of current or ex partner: Not on file     Emotionally abused: Not on file     Physically abused: Not on file     Forced sexual activity: Not on file   Other Topics Concern     Service No    Blood Transfusions No    Caffeine Concern No    Occupational Exposure No    Hobby Hazards No    Sleep Concern No    Stress Concern No    Weight Concern No    Special Diet No    Back Care No    Exercise No    Bike Helmet No    Seat Belt Yes    Self-Exams Yes   Social History Narrative    Not on file       Review of Systems  Review of Systems - Review of all systems is negative except as noted above in the HPI.     Vital Signs  Visit Vitals  /80 (BP 1 Location: Left arm, BP Patient Position: Sitting)   Pulse 87   Temp 98.6 °F (37 °C) (Oral)   Resp 16   Ht 5' 7\" (1.702 m)   Wt 200 lb 12.8 oz (91.1 kg)   LMP 02/06/2020 (Exact Date)   SpO2 100%   BMI 31.45 kg/m²     Physical Exam  Physical Examination: General appearance - alert, well appearing, and in no distress  Mental status - affect appropriate to mood  Chest - no tachypnea, retractions or cyanosis  Heart - S1 and S2 normal  Neurological - alert, oriented, normal speech, no focal findings or movement disorder noted  Extremities -mild discomfort to palpation right shoulder with discomfort still doing overhead motions due to pain no swelling or crepitus. Results  Results for orders placed or performed in visit on 80/68/19   METABOLIC PANEL, COMPREHENSIVE   Result Value Ref Range    Glucose 85 65 - 99 mg/dL    BUN 13 6 - 20 mg/dL    Creatinine 0.97 0.57 - 1.00 mg/dL    GFR est non-AA 78 >59 mL/min/1.73    GFR est AA 89 >59 mL/min/1.73    BUN/Creatinine ratio 13 9 - 23    Sodium 138 134 - 144 mmol/L    Potassium 4.5 3.5 - 5.2 mmol/L    Chloride 101 96 - 106 mmol/L    CO2 21 18 - 29 mmol/L    Calcium 9.2 8.7 - 10.2 mg/dL    Protein, total 6.7 6.0 - 8.5 g/dL    Albumin 4.2 3.5 - 5.5 g/dL    GLOBULIN, TOTAL 2.5 1.5 - 4.5 g/dL    A-G Ratio 1.7 1.2 - 2.2    Bilirubin, total 0.3 0.0 - 1.2 mg/dL    Alk. phosphatase 62 39 - 117 IU/L    AST (SGOT) 20 0 - 40 IU/L    ALT (SGPT) 19 0 - 32 IU/L   LIPID PANEL   Result Value Ref Range    Cholesterol, total 153 100 - 199 mg/dL    Triglyceride 139 0 - 149 mg/dL    HDL Cholesterol 66 >39 mg/dL    VLDL, calculated 28 5 - 40 mg/dL    LDL, calculated 59 0 - 99 mg/dL   CBC WITH AUTOMATED DIFF   Result Value Ref Range    WBC 7.9 3.4 - 10.8 x10E3/uL    RBC 4.23 3.77 - 5.28 x10E6/uL    HGB 12.4 11.1 - 15.9 g/dL    HCT 38.8 34.0 - 46.6 %    MCV 92 79 - 97 fL    MCH 29.3 26.6 - 33.0 pg    MCHC 32.0 31.5 - 35.7 g/dL    RDW 14.5 12.3 - 15.4 %    PLATELET 252 618 - 749 x10E3/uL    NEUTROPHILS 66 %    Lymphocytes 29 %    MONOCYTES 4 %    EOSINOPHILS 1 %    BASOPHILS 0 %    ABS. NEUTROPHILS 5.1 1.4 - 7.0 x10E3/uL    Abs Lymphocytes 2.3 0.7 - 3.1 x10E3/uL    ABS. MONOCYTES 0.4 0.1 - 0.9 x10E3/uL    ABS. EOSINOPHILS 0.1 0.0 - 0.4 x10E3/uL    ABS. BASOPHILS 0.0 0.0 - 0.2 x10E3/uL    IMMATURE GRANULOCYTES 0 %    ABS. IMM.  GRANS. 0.0 0.0 - 0.1 x10E3/uL   CVD REPORT   Result Value Ref Range    INTERPRETATION Note AMB POC URINALYSIS DIP STICK MANUAL W/O MICRO   Result Value Ref Range    Color (UA POC) Yellow     Clarity (UA POC) Clear     Glucose (UA POC) Negative Negative    Bilirubin (UA POC) Negative Negative    Ketones (UA POC) Negative Negative    Specific gravity (UA POC) 1.015 1.001 - 1.035    Blood (UA POC) Negative Negative    pH (UA POC) 6.0 4.6 - 8.0    Protein (UA POC) Negative Negative mg/dL    Urobilinogen (UA POC) 0.2 mg/dL 0.2 - 1    Nitrites (UA POC) Negative Negative    Leukocyte esterase (UA POC) Negative Negative       ASSESSMENT and PLAN    ICD-10-CM ICD-9-CM    1. Right shoulder pain, unspecified chronicity M25.511 719.41      reviewed diet, exercise and weight control  reviewed medications and side effects in detail    I have discussed the diagnosis with the patient and the intended plan of care as seen in the above orders. The patient has received an after-visit summary and questions were answered concerning future plans. I have discussed medication, side effects, and warnings with the patient in detail. The patient verbalized understanding and is in agreement with the plan of care. The patient will contact the office with any additional concerns. Gerald Reyes MD    PLEASE NOTE:   This document has been produced using voice recognition software.  Unrecognized errors in transcription may be present

## 2020-02-17 NOTE — LETTER
NOTIFICATION RETURN TO WORK 
 
2/17/2020 2:58 PM 
 
Ms. Cathy Dasilva [phone] [facility] [address] To Whom It May Concern: 
 
Cathy Dasilva is currently under the care of [facility]. She will return to work on: 02/18/2020. She will schedule appointment to see the orthopedist. She should not lift , pull or push loads greater than 10 pounds for a week. She should also not do any overhead motions for a week. She will follow up with the orthopedist for any further recommendations. If there are questions or concerns please have the patient contact our office. Sincerely, Scooby Mejia MD

## 2020-02-24 ENCOUNTER — TELEPHONE (OUTPATIENT)
Dept: FAMILY MEDICINE CLINIC | Age: 35
End: 2020-02-24

## 2020-02-25 ENCOUNTER — OFFICE VISIT (OUTPATIENT)
Dept: FAMILY MEDICINE CLINIC | Age: 35
End: 2020-02-25

## 2020-02-25 VITALS
TEMPERATURE: 98.6 F | HEIGHT: 67 IN | RESPIRATION RATE: 18 BRPM | OXYGEN SATURATION: 99 % | WEIGHT: 201 LBS | DIASTOLIC BLOOD PRESSURE: 76 MMHG | SYSTOLIC BLOOD PRESSURE: 125 MMHG | HEART RATE: 89 BPM | BODY MASS INDEX: 31.55 KG/M2

## 2020-02-25 DIAGNOSIS — M25.511 RIGHT SHOULDER PAIN, UNSPECIFIED CHRONICITY: Primary | ICD-10-CM

## 2020-02-25 RX ORDER — CYCLOBENZAPRINE HCL 10 MG
10 TABLET ORAL
Qty: 30 TAB | Refills: 0 | Status: SHIPPED | OUTPATIENT
Start: 2020-02-25 | End: 2020-08-17

## 2020-02-25 NOTE — PROGRESS NOTES
Chief Complaint   Patient presents with    Arm Pain     right arm     1. Have you been to the ER, urgent care clinic since your last visit? Hospitalized since your last visit? No    2. Have you seen or consulted any other health care providers outside of the [address] [place] [address] since your last visit? Include any pap smears or colon screening. No     [facility] in [place]  Ck Griffiths comes in for f/u care. Patient has right shoulder pain that is persistent. She has been on work restrictions but says she is unable to get back to full duty due to pain. She is yet to see the orthopedist. Has appointment next week. She has been on antiinflammatory and muscle relaxant. She will be off work until she is seen by the orthopedist. I will send in script for flexeril. She will continue to take ibuprofen. Past Medical History  Past Medical History:   Diagnosis Date    Gastritis        Surgical History  Past Surgical History:   Procedure Laterality Date    HX BREAST REDUCTION      HX TUBAL LIGATION          Medications  Current Outpatient Medications   Medication Sig Dispense Refill    cyclobenzaprine (FLEXERIL) 10 mg tablet TAKE 1 TABLET BY MOUTH EVERY DAY AT BEDTIME AS NEEDED FOR PAIN. CAUTION SEDATION  0    diclofenac EC (VOLTAREN) 50 mg EC tablet Take 1 Tab by mouth two (2) times daily as needed for Pain. 60 Tab 1       Allergies  No Known Allergies    Family History  History reviewed. No pertinent family history.     Social History  Social History     Socioeconomic History    Marital status: SINGLE     Spouse name: Not on file    Number of children: Not on file    Years of education: Not on file    Highest education level: Not on file   Occupational History    Occupation: Unemployed   Social Needs    Financial resource strain: Not on file    Food insecurity:     Worry: Not on file     Inability: Not on file    Transportation needs:     Medical: Not on file     Non-medical: Not on file   Tobacco Use    Smoking status: Current Every Day Smoker     Types: Cigarettes    Smokeless tobacco: Never Used   Substance and Sexual Activity    Alcohol use: Yes     Comment: Occ    Drug use: No    Sexual activity: Yes     Birth control/protection: Surgical   Lifestyle    Physical activity:     Days per week: Not on file     Minutes per session: Not on file    Stress: Not on file   Relationships    Social connections:     Talks on phone: Not on file     Gets together: Not on file     Attends Church service: Not on file     Active member of club or organization: Not on file     Attends meetings of clubs or organizations: Not on file     Relationship status: Not on file    Intimate partner violence:     Fear of current or ex partner: Not on file     Emotionally abused: Not on file     Physically abused: Not on file     Forced sexual activity: Not on file   Other Topics Concern     Service No    Blood Transfusions No    Caffeine Concern No    Occupational Exposure No    Hobby Hazards No    Sleep Concern No    Stress Concern No    Weight Concern No    Special Diet No    Back Care No    Exercise No    Bike Helmet No    Seat Belt Yes    Self-Exams Yes   Social History Narrative    Not on file     Review of Systems  Review of Systems - Review of all systems is negative except as noted above in the HPI.     Vital Signs  Visit Vitals  /76 (BP 1 Location: Left arm, BP Patient Position: Sitting)   Pulse 89   Temp 98.6 °F (37 °C) (Oral)   Resp 18   Ht 5' 7\" (1.702 m)   Wt 201 lb (91.2 kg)   LMP 02/06/2020 (Exact Date)   SpO2 99%   BMI 31.48 kg/m²         Physical Exam  Physical Examination: General appearance - alert, well appearing, and in no distress  Mental status - alert, oriented to person, place, and time  Chest - clear to auscultation, no wheezes, rales or rhonchi, symmetric air entry  Heart - normal rate and regular rhythm, S1 and S2 normal  Neurological - neck supple without rigidity  Musculoskeletal - right shoulder pain lateral margins and lateral aspect of the arm. Has limited ROM  Extremities - no pedal edema noted    Results  Results for orders placed or performed in visit on 20/70/68   METABOLIC PANEL, COMPREHENSIVE   Result Value Ref Range    Glucose 85 65 - 99 mg/dL    BUN 13 6 - 20 mg/dL    Creatinine 0.97 0.57 - 1.00 mg/dL    GFR est non-AA 78 >59 mL/min/1.73    GFR est AA 89 >59 mL/min/1.73    BUN/Creatinine ratio 13 9 - 23    Sodium 138 134 - 144 mmol/L    Potassium 4.5 3.5 - 5.2 mmol/L    Chloride 101 96 - 106 mmol/L    CO2 21 18 - 29 mmol/L    Calcium 9.2 8.7 - 10.2 mg/dL    Protein, total 6.7 6.0 - 8.5 g/dL    Albumin 4.2 3.5 - 5.5 g/dL    GLOBULIN, TOTAL 2.5 1.5 - 4.5 g/dL    A-G Ratio 1.7 1.2 - 2.2    Bilirubin, total 0.3 0.0 - 1.2 mg/dL    Alk. phosphatase 62 39 - 117 IU/L    AST (SGOT) 20 0 - 40 IU/L    ALT (SGPT) 19 0 - 32 IU/L   LIPID PANEL   Result Value Ref Range    Cholesterol, total 153 100 - 199 mg/dL    Triglyceride 139 0 - 149 mg/dL    HDL Cholesterol 66 >39 mg/dL    VLDL, calculated 28 5 - 40 mg/dL    LDL, calculated 59 0 - 99 mg/dL   CBC WITH AUTOMATED DIFF   Result Value Ref Range    WBC 7.9 3.4 - 10.8 x10E3/uL    RBC 4.23 3.77 - 5.28 x10E6/uL    HGB 12.4 11.1 - 15.9 g/dL    HCT 38.8 34.0 - 46.6 %    MCV 92 79 - 97 fL    MCH 29.3 26.6 - 33.0 pg    MCHC 32.0 31.5 - 35.7 g/dL    RDW 14.5 12.3 - 15.4 %    PLATELET 862 735 - 720 x10E3/uL    NEUTROPHILS 66 %    Lymphocytes 29 %    MONOCYTES 4 %    EOSINOPHILS 1 %    BASOPHILS 0 %    ABS. NEUTROPHILS 5.1 1.4 - 7.0 x10E3/uL    Abs Lymphocytes 2.3 0.7 - 3.1 x10E3/uL    ABS. MONOCYTES 0.4 0.1 - 0.9 x10E3/uL    ABS. EOSINOPHILS 0.1 0.0 - 0.4 x10E3/uL    ABS. BASOPHILS 0.0 0.0 - 0.2 x10E3/uL    IMMATURE GRANULOCYTES 0 %    ABS. IMM.  GRANS. 0.0 0.0 - 0.1 x10E3/uL   CVD REPORT   Result Value Ref Range    INTERPRETATION Note    AMB POC URINALYSIS DIP STICK MANUAL W/O MICRO   Result Value Ref Range    Color (UA POC) Yellow     Clarity (UA POC) Clear     Glucose (UA POC) Negative Negative    Bilirubin (UA POC) Negative Negative    Ketones (UA POC) Negative Negative    Specific gravity (UA POC) 1.015 1.001 - 1.035    Blood (UA POC) Negative Negative    pH (UA POC) 6.0 4.6 - 8.0    Protein (UA POC) Negative Negative mg/dL    Urobilinogen (UA POC) 0.2 mg/dL 0.2 - 1    Nitrites (UA POC) Negative Negative    Leukocyte esterase (UA POC) Negative Negative       ASSESSMENT and PLAN    ICD-10-CM ICD-9-CM    1. Right shoulder pain, unspecified chronicity M25.511 719.41 cyclobenzaprine (FLEXERIL) 10 mg tablet     reviewed diet, exercise and weight control  reviewed medications and side effects in detail  radiology results and schedule of future radiology studies reviewed with patient    I have discussed the diagnosis with the patient and the intended plan of care as seen in the above orders. The patient has received an after-visit summary and questions were answered concerning future plans. I have discussed medication, side effects, and warnings with the patient in detail. The patient verbalized understanding and is in agreement with the plan of care. The patient will contact the office with any additional concerns. Devonte Rojo MD    PLEASE NOTE:   This document has been produced using voice recognition software.  Unrecognized errors in transcription may be present

## 2020-02-25 NOTE — LETTER
2/25/2020 10:19 AM 
 
Ms. Ranjit Gomez [address] [facility] [address] To Whom It May Concern: 
 
Ranjit Gomez is currently under the care of [address] [facility]. She is to be of work until she is seen by the orthopedist on 03/02/2020. If there are questions or concerns please have the patient contact our office. Sincerely, Marisel Kim MD

## 2020-02-26 ENCOUNTER — TELEPHONE (OUTPATIENT)
Dept: FAMILY MEDICINE CLINIC | Age: 35
End: 2020-02-26

## 2020-03-03 ENCOUNTER — OFFICE VISIT (OUTPATIENT)
Dept: ORTHOPEDIC SURGERY | Age: 35
End: 2020-03-03

## 2020-03-03 VITALS
WEIGHT: 201.8 LBS | OXYGEN SATURATION: 99 % | SYSTOLIC BLOOD PRESSURE: 122 MMHG | DIASTOLIC BLOOD PRESSURE: 83 MMHG | TEMPERATURE: 98.2 F | RESPIRATION RATE: 17 BRPM | HEIGHT: 67 IN | BODY MASS INDEX: 31.67 KG/M2 | HEART RATE: 83 BPM

## 2020-03-03 DIAGNOSIS — M75.51 SUBACROMIAL BURSITIS OF RIGHT SHOULDER JOINT: Primary | ICD-10-CM

## 2020-03-03 RX ORDER — MELOXICAM 15 MG/1
15 TABLET ORAL
Qty: 30 TAB | Refills: 1 | Status: SHIPPED | OUTPATIENT
Start: 2020-03-03 | End: 2020-08-17

## 2020-03-03 RX ORDER — TRIAMCINOLONE ACETONIDE 40 MG/ML
40 INJECTION, SUSPENSION INTRA-ARTICULAR; INTRAMUSCULAR ONCE
Qty: 1 ML | Refills: 0
Start: 2020-03-03 | End: 2020-03-03

## 2020-03-03 NOTE — LETTER
NOTIFICATION RETURN TO WORK / SCHOOL 
 
3/3/2020 9:00 AM 
 
Ms. Evi Tilley [address] [facility] [address] To Whom It May Concern: 
 
Evi Tilley is currently under the care of [facility] [place] [facility] Wilkins. She was seen for an appointment today 3/3/2020. She will return to work full duty no restrictions on 3/10/2020. If there are questions or concerns please have the patient contact our office. Sincerely, Michelle Higuera MD

## 2020-03-03 NOTE — PROGRESS NOTES
Yasemin Smith  1985   Chief Complaint   Patient presents with    Shoulder Pain     Right        HISTORY OF PRESENT ILLNESS  Yasemin Smith is a 29 y.o. female who presents today for evaluation of right shoulder pain. Pt referred by Dr. Theron Law. Patient rates pain as 7/10 today. Pain has been present for around 1 month. Pt states she may have slept on her arm wrong. 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 Pt reports she cannot sleep on her arm at night, the pain wakes her up on occasion. Pt notes pain at the front of the shoulder. Pain with lifting the arm. Patient denies any fever, chills, chest pain, shortness of breath or calf pain. The remainder of the review of systems is negative. There are no new illness or injuries to report since last seen in the office. There are no changes to medications, allergies, family or social history. Pain Assessment  3/3/2020   Location of Pain Shoulder   Location Modifiers Right   Severity of Pain 7   Quality of Pain Aching; Throbbing   Quality of Pain Comment -   Duration of Pain Persistent   Duration of Pain Comment Pain worse at night   Frequency of Pain Constant   Aggravating Factors (No Data)   Aggravating Factors Comment Any movements with RUE   Limiting Behavior Yes   Relieving Factors Other (Comment);NSAID   Relieving Factors Comment Muscle Relaxers   Result of Injury No   Work-Related Injury -   How long out of work? -   Type of Injury -   Type of Injury Comment -     PHYSICAL EXAM:   Visit Vitals  /83 (BP 1 Location: Left arm, BP Patient Position: Sitting)   Pulse 83   Temp 98.2 °F (36.8 °C) (Oral)   Resp 17   Ht 5' 7\" (1.702 m)   Wt 201 lb 12.8 oz (91.5 kg)   LMP 02/06/2020 (Exact Date)   SpO2 99%   BMI 31.61 kg/m²     The patient is a well-developed, well-nourished female   in no acute distress. The patient is alert and oriented times three. The patient is alert and oriented times three.  Mood and affect are normal.  LYMPHATIC: lymph nodes are not enlarged and are within normal limits  SKIN: normal in color and non tender to palpation. There are no bruises or abrasions noted. NEUROLOGICAL: Motor sensory exam is within normal limits. Reflexes are equal bilaterally. There is normal sensation to pinprick and light touch  MUSCULOSKELETAL:  Examination Right shoulder   Skin Intact   AC joint tenderness -   Biceps tenderness -   Forward flexion/Elevation    Active abduction    Glenohumeral abduction 90   External rotation ROM 45   Internal rotation ROM 30   Apprehension -   Riveras Relocation -   Jerk -   Load and Shift -   Obriens -   Speeds -   Impingement sign +   Supraspinatus/Empty Can +   External Rotation Strength -, 5/5   Lift Off/Belly Press -, 5/5   Neurovascular Intact     PROCEDURE: Right Shoulder Injection with Ultrasound Guidance  Indication:Right Shoulder pain/swelling    After sterile prep, 6 cc of Xylocaine and 1 cc of Kenalog were injected into the right shoulder. Ultrasound images captured using [employer] Loop Ultrasound machine and scanned into patient's chart.        [facility]  OFFICE PROCEDURE PROGRESS NOTE        Chart reviewed for the following:  Chago Javed M.D, have reviewed the History, Physical and updated the Allergic reactions for [address] performed immediately prior to start of procedure:  Chago Javed M.D, have performed the following reviews on [facility] prior to the start of the procedure:            * Patient was identified by name and date of birth   * Agreement on procedure being performed was verified  * Risks and Benefits explained to the patient  * Procedure site verified and marked as necessary  * Patient was positioned for comfort  * Consent was signed and verified     Time: 8:50 AM     Date of procedure: 3/3/2020    Procedure performed by:  Windsor Romberg Dedra Albarran M.D    Provider assisted by: (see medication administration)    How tolerated by patient: tolerated the procedure well with no complications    Comments: none      IMAGING: XR of right shoulder from [facility] dated 2/11/2020 was reviewed and read by Dr. Blanco Lean:   IMPRESSION:  1. No acute fracture-dislocation. IMPRESSION:      ICD-10-CM ICD-9-CM    1. Subacromial bursitis of right shoulder joint M75.51 726.19 TRIAMCINOLONE ACETONIDE INJ      triamcinolone acetonide (KENALOG) 40 mg/mL injection      US GUIDE INJ/ASP/ARTHRO LG JNT/BURSA      meloxicam (MOBIC) 15 mg tablet        PLAN:   1. Pt presents today with right shoulder pain due to subacromial bursitis and I would like to try an injection today. If this does not provide lasting relief, will consider getting an MRI. Risk factors include: BMI>30  2. No ultrasound exam indicated today  3. Yes cortisone injection indicated today R SHOULDER US  4. No Physical/Occupational Therapy indicated today  5. No diagnostic test indicated today:   6. No durable medical equipment indicated today  7. No referral indicated today   8. Yes medications indicated today: MOBIC  9. No Narcotic indicated today       RTC 3 weeks if pain continues    Office note will be sent to referring provider. Scribed by Mariely Scott [address]) as dictated by Gretel Pruitt MD    I, Dr. Gretel Pruitt, confirm that all documentation is accurate.     Gretel Pruitt M.D.   [facility]

## 2020-03-17 ENCOUNTER — OFFICE VISIT (OUTPATIENT)
Dept: FAMILY MEDICINE CLINIC | Age: 35
End: 2020-03-17

## 2020-03-17 VITALS
HEART RATE: 77 BPM | RESPIRATION RATE: 18 BRPM | WEIGHT: 200 LBS | DIASTOLIC BLOOD PRESSURE: 80 MMHG | SYSTOLIC BLOOD PRESSURE: 107 MMHG | BODY MASS INDEX: 31.39 KG/M2 | TEMPERATURE: 97.5 F | HEIGHT: 67 IN

## 2020-03-17 DIAGNOSIS — G89.29 CHRONIC MIDLINE LOW BACK PAIN, UNSPECIFIED WHETHER SCIATICA PRESENT: Primary | ICD-10-CM

## 2020-03-17 DIAGNOSIS — K59.00 CONSTIPATION, UNSPECIFIED CONSTIPATION TYPE: ICD-10-CM

## 2020-03-17 DIAGNOSIS — K92.1 BLOOD IN STOOL: ICD-10-CM

## 2020-03-17 DIAGNOSIS — M54.50 CHRONIC MIDLINE LOW BACK PAIN, UNSPECIFIED WHETHER SCIATICA PRESENT: Primary | ICD-10-CM

## 2020-03-17 RX ORDER — POLYETHYLENE GLYCOL 3350 17 G/17G
17 POWDER, FOR SOLUTION ORAL DAILY
Qty: 30 PACKET | Refills: 3 | Status: SHIPPED | OUTPATIENT
Start: 2020-03-17 | End: 2020-10-13

## 2020-03-17 NOTE — PROGRESS NOTES
Chief Complaint   Patient presents with    Back Pain     1. Have you been to the ER, urgent care clinic since your last visit? Hospitalized since your last visit? No    2. Have you seen or consulted any other health care providers outside of the [address] [place] [address] since your last visit? Include any pap smears or colon screening. No     HPI  [facility] comes in for f/u care. Back pain: patient has low back pain. She has a h/o spinal stenosis of the lumbar region. She has been f/u by the spine specialist. She has been seen at [facility]. Denies bladder or bowel dysfunction. Patient states that pain persists. She does need follow-up with a spine specialist.  She currently takes Mobic and Flexeril. We will have her continue with this medication and place a referral for to be seen by the specialist.  Patient states that she has an appointment for follow-up care. Blood in stool: she has noted blood in stool. She has constipation and hard stool. She says her bowel habits have changed. I will give miralax. She has no h/o hemorrhoids. She has seen a gastroenterologist in the past and would like to be referred to the specialist.      Past Medical History  Past Medical History:   Diagnosis Date    Gastritis        Surgical History  Past Surgical History:   Procedure Laterality Date    HX BREAST REDUCTION      HX TUBAL LIGATION          Medications  Current Outpatient Medications   Medication Sig Dispense Refill    meloxicam (MOBIC) 15 mg tablet Take 1 Tab by mouth daily (with breakfast). 30 Tab 1    cyclobenzaprine (FLEXERIL) 10 mg tablet Take 1 Tab by mouth three (3) times daily as needed for Muscle Spasm(s). 30 Tab 0       Allergies  No Known Allergies    Family History  No family history on file.     Social History  Social History     Socioeconomic History    Marital status: SINGLE     Spouse name: Not on file    Number of children: Not on file    Years of education: Not on file  Highest education level: Not on file   Occupational History    Occupation: Unemployed   Social Needs    Financial resource strain: Not on file    Food insecurity     Worry: Not on file     Inability: Not on file   [employer] needs     Medical: Not on file     Non-medical: Not on file   Tobacco Use    Smoking status: Current Every Day Smoker     Types: Cigarettes    Smokeless tobacco: Never Used   Substance and Sexual Activity    Alcohol use: Yes     Comment: Occ    Drug use: No    Sexual activity: Yes     Birth control/protection: Surgical   Lifestyle    Physical activity     Days per week: Not on file     Minutes per session: Not on file    Stress: Not on file   Relationships    Social connections     Talks on phone: Not on file     Gets together: Not on file     Attends [employer] service: Not on file     Active member of club or organization: Not on file     Attends meetings of clubs or organizations: Not on file     Relationship status: Not on file    Intimate partner violence     Fear of current or ex partner: Not on file     Emotionally abused: Not on file     Physically abused: Not on file     Forced sexual activity: Not on file   Other Topics Concern     Service No    Blood Transfusions No    Caffeine Concern No    Occupational Exposure No    Hobby Hazards No    Sleep Concern No    Stress Concern No    Weight Concern No    Special Diet No    Back Care No    Exercise No    Bike Helmet No    Seat Belt Yes    Self-Exams Yes   Social History Narrative    Not on file       Review of Systems  Review of Systems - Review of all systems is negative except as noted above in the HPI.     Vital Signs  Visit Vitals  /80 (BP 1 Location: Left arm, BP Patient Position: Sitting)   Pulse 77   Temp 97.5 °F (36.4 °C) (Oral)   Resp 18   Ht 5' 7\" (1.702 m)   Wt 200 lb (90.7 kg)   LMP 03/12/2020   BMI 31.32 kg/m²         Physical Exam  Physical Examination: General appearance - oriented to person, place, and time, overweight and acyanotic, in no respiratory distress  Mental status - alert, oriented to person, place, and time, affect appropriate to mood  Chest - clear to auscultation, no wheezes, rales or rhonchi, symmetric air entry  Heart - normal rate and regular rhythm, S1 and S2 normal  Back exam - limited range of motion, pain with motion noted during exam, tenderness noted paralumbar muscles and midline lumbar spine  Neurological - motor and sensory grossly normal bilaterally  Extremities - no pedal edema noted, intact peripheral pulses    Results  Results for orders placed or performed in visit on 18/61/98   METABOLIC PANEL, COMPREHENSIVE   Result Value Ref Range    Glucose 85 65 - 99 mg/dL    BUN 13 6 - 20 mg/dL    Creatinine 0.97 0.57 - 1.00 mg/dL    GFR est non-AA 78 >59 mL/min/1.73    GFR est AA 89 >59 mL/min/1.73    BUN/Creatinine ratio 13 9 - 23    Sodium 138 134 - 144 mmol/L    Potassium 4.5 3.5 - 5.2 mmol/L    Chloride 101 96 - 106 mmol/L    CO2 21 18 - 29 mmol/L    Calcium 9.2 8.7 - 10.2 mg/dL    Protein, total 6.7 6.0 - 8.5 g/dL    Albumin 4.2 3.5 - 5.5 g/dL    GLOBULIN, TOTAL 2.5 1.5 - 4.5 g/dL    A-G Ratio 1.7 1.2 - 2.2    Bilirubin, total 0.3 0.0 - 1.2 mg/dL    Alk. phosphatase 62 39 - 117 IU/L    AST (SGOT) 20 0 - 40 IU/L    ALT (SGPT) 19 0 - 32 IU/L   LIPID PANEL   Result Value Ref Range    Cholesterol, total 153 100 - 199 mg/dL    Triglyceride 139 0 - 149 mg/dL    HDL Cholesterol 66 >39 mg/dL    VLDL, calculated 28 5 - 40 mg/dL    LDL, calculated 59 0 - 99 mg/dL   CBC WITH AUTOMATED DIFF   Result Value Ref Range    WBC 7.9 3.4 - 10.8 x10E3/uL    RBC 4.23 3.77 - 5.28 x10E6/uL    HGB 12.4 11.1 - 15.9 g/dL    HCT 38.8 34.0 - 46.6 %    MCV 92 79 - 97 fL    MCH 29.3 26.6 - 33.0 pg    MCHC 32.0 31.5 - 35.7 g/dL    RDW 14.5 12.3 - 15.4 %    PLATELET 714 579 - 167 x10E3/uL    NEUTROPHILS 66 %    Lymphocytes 29 %    MONOCYTES 4 %    EOSINOPHILS 1 %    BASOPHILS 0 %    ABS. NEUTROPHILS 5.1 1.4 - 7.0 x10E3/uL    Abs Lymphocytes 2.3 0.7 - 3.1 x10E3/uL    ABS. MONOCYTES 0.4 0.1 - 0.9 x10E3/uL    ABS. EOSINOPHILS 0.1 0.0 - 0.4 x10E3/uL    ABS. BASOPHILS 0.0 0.0 - 0.2 x10E3/uL    IMMATURE GRANULOCYTES 0 %    ABS. IMM. GRANS. 0.0 0.0 - 0.1 x10E3/uL   CVD REPORT   Result Value Ref Range    INTERPRETATION Note    AMB POC URINALYSIS DIP STICK MANUAL W/O MICRO   Result Value Ref Range    Color (UA POC) Yellow     Clarity (UA POC) Clear     Glucose (UA POC) Negative Negative    Bilirubin (UA POC) Negative Negative    Ketones (UA POC) Negative Negative    Specific gravity (UA POC) 1.015 1.001 - 1.035    Blood (UA POC) Negative Negative    pH (UA POC) 6.0 4.6 - 8.0    Protein (UA POC) Negative Negative mg/dL    Urobilinogen (UA POC) 0.2 mg/dL 0.2 - 1    Nitrites (UA POC) Negative Negative    Leukocyte esterase (UA POC) Negative Negative       ASSESSMENT and PLAN    ICD-10-CM ICD-9-CM    1. Chronic midline low back pain, unspecified whether sciatica present M54.5 724.2 REFERRAL TO ORTHOPEDICS    G89.29 338.29    2. Constipation, unspecified constipation type K59.00 564.00 REFERRAL TO GASTROENTEROLOGY   3. Blood in stool K92.1 578.1 REFERRAL TO GASTROENTEROLOGY     reviewed diet, exercise and weight control  reviewed medications and side effects in detail      I have discussed the diagnosis with the patient and the intended plan of care as seen in the above orders. The patient has received an after-visit summary and questions were answered concerning future plans. I have discussed medication, side effects, and warnings with the patient in detail. The patient verbalized understanding and is in agreement with the plan of care. The patient will contact the office with any additional concerns. Bo Lindsay MD    PLEASE NOTE:   This document has been produced using voice recognition software.  Unrecognized errors in transcription may be present

## 2020-03-17 NOTE — LETTER
NOTIFICATION RETURN TO WORK  
 
3/17/2020 9:54 AM 
 
Ms. Ck Griffiths [address] [facility] [address] To Whom It May Concern: 
 
Ck Griffiths is currently under the care of [address] [place] [address]. She will return to work on: 03/18/2020 If there are questions or concerns please have the patient contact our office. Sincerely, Barney Ledesma MD

## 2020-07-22 ENCOUNTER — TELEPHONE (OUTPATIENT)
Dept: FAMILY MEDICINE CLINIC | Age: 35
End: 2020-07-22

## 2020-08-17 ENCOUNTER — OFFICE VISIT (OUTPATIENT)
Dept: FAMILY MEDICINE CLINIC | Age: 35
End: 2020-08-17

## 2020-08-17 VITALS
TEMPERATURE: 98.4 F | RESPIRATION RATE: 18 BRPM | HEART RATE: 63 BPM | DIASTOLIC BLOOD PRESSURE: 84 MMHG | OXYGEN SATURATION: 94 % | BODY MASS INDEX: 32.02 KG/M2 | HEIGHT: 67 IN | WEIGHT: 204 LBS | SYSTOLIC BLOOD PRESSURE: 136 MMHG

## 2020-08-17 DIAGNOSIS — K59.00 CONSTIPATION, UNSPECIFIED CONSTIPATION TYPE: ICD-10-CM

## 2020-08-17 DIAGNOSIS — K92.1 BLOOD IN STOOL: ICD-10-CM

## 2020-08-17 DIAGNOSIS — E66.9 OBESITY (BMI 30-39.9): ICD-10-CM

## 2020-08-17 DIAGNOSIS — L73.9 FOLLICULITIS: Primary | ICD-10-CM

## 2020-08-17 DIAGNOSIS — K21.9 GASTROESOPHAGEAL REFLUX DISEASE, ESOPHAGITIS PRESENCE NOT SPECIFIED: ICD-10-CM

## 2020-08-17 RX ORDER — DOXYCYCLINE 100 MG/1
100 TABLET ORAL 2 TIMES DAILY
Qty: 20 TAB | Refills: 0 | Status: SHIPPED | OUTPATIENT
Start: 2020-08-17 | End: 2020-08-27

## 2020-08-17 RX ORDER — PANTOPRAZOLE SODIUM 20 MG/1
20 TABLET, DELAYED RELEASE ORAL DAILY
Qty: 90 TAB | Refills: 1 | Status: SHIPPED | OUTPATIENT
Start: 2020-08-17 | End: 2020-10-13

## 2020-08-17 NOTE — PROGRESS NOTES
Chief Complaint   Patient presents with    Follow-up     positive covid on 07/13/20 no symtoms noted     Abdominal Pain     follow up      1. Have you been to the ER, urgent care clinic since your last visit? Hospitalized since your last visit? No    2. Have you seen or consulted any other health care providers outside of the [address] [place] [address] since your last visit? Include any pap smears or colon screening. No     HPI  [facility] comes in for follow-up care. Folliculitis: Patient has folliculitis lesions around the axillary hair follicles. These come on and off. Currently has some bumps that are occasionally tender and she squeezes them with some whitish material obtained. No fever or chills. I will send in a prescription for doxycycline. Abdominal pain: Patient has a history of gastritis. She gets epigastric pain that comes on and off. No nausea or vomiting. Denies hematemesis. Gets heartburn. I will send in Protonix. We have placed a referral for her to be seen by the gastroenterologist.  Blood in stool: Patient has occasional blood in stool with constipation. She has noted changes in her bowel habits. In the past we have referred her to gastroenterologist.  I did give her a number to call to set up an appointment to see the gastroenterologist.  She denies weight loss, fever or night sweats. She can take MiraLAX as needed for constipation. Obesity: Patient has a BMI of 31.95. Weight has been fluctuating. She has been referred to the dietitian in the past but did not get in to see the specialist.  I will give her number to call to set up an appointment. She should intensify lifestyle and dietary modification.     Past Medical History  Past Medical History:   Diagnosis Date    Gastritis        Surgical History  Past Surgical History:   Procedure Laterality Date    HX BREAST REDUCTION      HX TUBAL LIGATION          Medications  Current Outpatient Medications   Medication Sig Dispense Refill    doxycycline (ADOXA) 100 mg tablet Take 1 Tab by mouth two (2) times a day for 10 days. 20 Tab 0    pantoprazole (PROTONIX) 20 mg tablet Take 1 Tab by mouth daily. 90 Tab 1    polyethylene glycol (MIRALAX) 17 gram packet Take 1 Packet by mouth daily. 30 Packet 3       Allergies  No Known Allergies    Family History  No family history on file. Social History  Social History     Socioeconomic History    Marital status: SINGLE     Spouse name: Not on file    Number of children: Not on file    Years of education: Not on file    Highest education level: Not on file   Occupational History    Occupation: Unemployed   Social Needs    Financial resource strain: Not on file    Food insecurity     Worry: Not on file     Inability: Not on file   [employer] needs     Medical: Not on file     Non-medical: Not on file   Tobacco Use    Smoking status: Current Every Day Smoker     Types: Cigarettes    Smokeless tobacco: Never Used   Substance and Sexual Activity    Alcohol use: Yes     Comment:  Occ    Drug use: No    Sexual activity: Yes     Birth control/protection: Surgical   Lifestyle    Physical activity     Days per week: Not on file     Minutes per session: Not on file    Stress: Not on file   Relationships    Social connections     Talks on phone: Not on file     Gets together: Not on file     Attends Restoration service: Not on file     Active member of club or organization: Not on file     Attends meetings of clubs or organizations: Not on file     Relationship status: Not on file    Intimate partner violence     Fear of current or ex partner: Not on file     Emotionally abused: Not on file     Physically abused: Not on file     Forced sexual activity: Not on file   Other Topics Concern     Service No    Blood Transfusions No    Caffeine Concern No    Occupational Exposure No    Hobby Hazards No    Sleep Concern No    Stress Concern No    Weight Concern No    Special Diet No    Back Care No    Exercise No    Bike Helmet No    Seat Belt Yes    Self-Exams Yes   Social History Narrative    Not on file       Review of Systems  Review of Systems - Review of all systems is negative except as noted above in the HPI. Vital Signs  Visit Vitals  /84 (BP 1 Location: Left arm, BP Patient Position: Sitting)   Pulse 63   Temp 98.4 °F (36.9 °C) (Oral)   Resp 18   Ht 5' 7\" (1.702 m)   Wt 204 lb (92.5 kg)   LMP 07/12/2020 (Approximate)   SpO2 94%   BMI 31.95 kg/m²         Physical Exam  Physical Examination: General appearance - alert, well appearing, and in no distress, oriented to person, place, and time, overweight, acyanotic, in no respiratory distress and well hydrated  Mental status - alert, oriented to person, place, and time, affect appropriate to mood  Chest - clear to auscultation, no wheezes, rales or rhonchi, symmetric air entry  Heart - normal rate, regular rhythm, normal S1, S2, no murmurs, rubs, clicks or gallops  Abdomen - soft, nontender, nondistended, no masses or organomegaly  Neurological - motor and sensory grossly normal bilaterally  Musculoskeletal - full range of motion without pain  Extremities - no pedal edema noted, intact peripheral pulses      Results  Results for orders placed or performed in visit on 38/25/08   METABOLIC PANEL, COMPREHENSIVE   Result Value Ref Range    Glucose 85 65 - 99 mg/dL    BUN 13 6 - 20 mg/dL    Creatinine 0.97 0.57 - 1.00 mg/dL    GFR est non-AA 78 >59 mL/min/1.73    GFR est AA 89 >59 mL/min/1.73    BUN/Creatinine ratio 13 9 - 23    Sodium 138 134 - 144 mmol/L    Potassium 4.5 3.5 - 5.2 mmol/L    Chloride 101 96 - 106 mmol/L    CO2 21 18 - 29 mmol/L    Calcium 9.2 8.7 - 10.2 mg/dL    Protein, total 6.7 6.0 - 8.5 g/dL    Albumin 4.2 3.5 - 5.5 g/dL    GLOBULIN, TOTAL 2.5 1.5 - 4.5 g/dL    A-G Ratio 1.7 1.2 - 2.2    Bilirubin, total 0.3 0.0 - 1.2 mg/dL    Alk.  phosphatase 62 39 - 117 IU/L    AST (SGOT) 20 0 - 40 IU/L ALT (SGPT) 19 0 - 32 IU/L   LIPID PANEL   Result Value Ref Range    Cholesterol, total 153 100 - 199 mg/dL    Triglyceride 139 0 - 149 mg/dL    HDL Cholesterol 66 >39 mg/dL    VLDL, calculated 28 5 - 40 mg/dL    LDL, calculated 59 0 - 99 mg/dL   CBC WITH AUTOMATED DIFF   Result Value Ref Range    WBC 7.9 3.4 - 10.8 x10E3/uL    RBC 4.23 3.77 - 5.28 x10E6/uL    HGB 12.4 11.1 - 15.9 g/dL    HCT 38.8 34.0 - 46.6 %    MCV 92 79 - 97 fL    MCH 29.3 26.6 - 33.0 pg    MCHC 32.0 31.5 - 35.7 g/dL    RDW 14.5 12.3 - 15.4 %    PLATELET 527 324 - 058 x10E3/uL    NEUTROPHILS 66 %    Lymphocytes 29 %    MONOCYTES 4 %    EOSINOPHILS 1 %    BASOPHILS 0 %    ABS. NEUTROPHILS 5.1 1.4 - 7.0 x10E3/uL    Abs Lymphocytes 2.3 0.7 - 3.1 x10E3/uL    ABS. MONOCYTES 0.4 0.1 - 0.9 x10E3/uL    ABS. EOSINOPHILS 0.1 0.0 - 0.4 x10E3/uL    ABS. BASOPHILS 0.0 0.0 - 0.2 x10E3/uL    IMMATURE GRANULOCYTES 0 %    ABS. IMM. GRANS. 0.0 0.0 - 0.1 x10E3/uL   CVD REPORT   Result Value Ref Range    INTERPRETATION Note    AMB POC URINALYSIS DIP STICK MANUAL W/O MICRO   Result Value Ref Range    Color (UA POC) Yellow     Clarity (UA POC) Clear     Glucose (UA POC) Negative Negative    Bilirubin (UA POC) Negative Negative    Ketones (UA POC) Negative Negative    Specific gravity (UA POC) 1.015 1.001 - 1.035    Blood (UA POC) Negative Negative    pH (UA POC) 6.0 4.6 - 8.0    Protein (UA POC) Negative Negative mg/dL    Urobilinogen (UA POC) 0.2 mg/dL 0.2 - 1    Nitrites (UA POC) Negative Negative    Leukocyte esterase (UA POC) Negative Negative       ASSESSMENT and PLAN      ICD-10-CM ICD-9-CM    1. Folliculitis  Z52.3 472.0 doxycycline (ADOXA) 100 mg tablet   2. Obesity (BMI 30-39. 9)  E66.9 278.00    3. Blood in stool  K92.1 578.1    4. Constipation, unspecified constipation type  K59.00 564.00    5.  Gastroesophageal reflux disease, esophagitis presence not specified  K21.9 530.81 pantoprazole (PROTONIX) 20 mg tablet     lab results and schedule of future lab studies reviewed with patient  reviewed diet, exercise and weight control  cardiovascular risk and specific lipid/LDL goals reviewed  reviewed medications and side effects in detail      I have discussed the diagnosis with the patient and the intended plan of care as seen in the above orders. The patient has received an after-visit summary and questions were answered concerning future plans. I have discussed medication, side effects, and warnings with the patient in detail. The patient verbalized understanding and is in agreement with the plan of care. The patient will contact the office with any additional concerns. Rossana Monroe MD    PLEASE NOTE:   This document has been produced using voice recognition software.  Unrecognized errors in transcription may be present

## 2020-08-19 ENCOUNTER — TELEPHONE (OUTPATIENT)
Dept: FAMILY MEDICINE CLINIC | Age: 35
End: 2020-08-19

## 2020-08-19 DIAGNOSIS — E66.9 OBESITY (BMI 30-39.9): Primary | ICD-10-CM

## 2020-08-19 NOTE — TELEPHONE ENCOUNTER
Patient called stating she called [facility] to schedule appt for dietitian.  They are requesting updated referral.

## 2020-08-20 NOTE — TELEPHONE ENCOUNTER
Referred to [facility] at [address]. [address] [facility] [address] [facility], 70 [facility]  Phone: [phone]  Fax: [phone]

## 2020-09-22 ENCOUNTER — HOSPITAL ENCOUNTER (OUTPATIENT)
Dept: NUTRITION | Age: 35
Discharge: HOME | End: 2020-09-22
Payer: COMMERCIAL

## 2020-09-22 ENCOUNTER — HOSPITAL ENCOUNTER (OUTPATIENT)
Dept: NUTRITION | Age: 35
End: 2020-09-22

## 2020-09-22 PROCEDURE — 97802 MEDICAL NUTRITION INDIV IN: CPT

## 2020-09-22 NOTE — PROGRESS NOTES
[employer] - Outpatient Nutrition Services  [address] [place] [address], 70 [facility]  Phone: [phone] Fax: [phone]   Nutrition Assessment - Medical Nutrition Therapy   Outpatient Initial Evaluation         Patient Name: Sergio Kim : 1985   Treatment Diagnosis: obesity   Referral Source: Devon Ma MD Start of Care [facility]): 2020     Gender: female Age: 28 y.o. Ht: 66 In Wt: 206.4 lb  kg   BMI: 33.3 BMR   Male  BMR Female [id number]     Past Medical History:  none     Pertinent Medications:   none     Biochemical Data:   No results found for: HBA1C, HGBE8, QWU7HGLF, RIJ8JNUX  Lab Results   Component Value Date/Time    Sodium 138 2017 12:42 PM    Potassium 4.5 2017 12:42 PM    Chloride 101 2017 12:42 PM    CO2 21 2017 12:42 PM    Anion gap 7 2017 12:36 PM    Glucose 85 2017 12:42 PM    BUN 13 2017 12:42 PM    Creatinine 0.97 2017 12:42 PM    BUN/Creatinine ratio 13 2017 12:42 PM    GFR est AA 89 2017 12:42 PM    GFR est non-AA 78 2017 12:42 PM    Calcium 9.2 2017 12:42 PM    Bilirubin, total 0.3 2017 12:42 PM    Alk. phosphatase 62 2017 12:42 PM    Protein, total 6.7 2017 12:42 PM    Albumin 4.2 2017 12:42 PM    Globulin 3.2 2017 12:36 PM    A-G Ratio 1.7 2017 12:42 PM    ALT (SGPT) 19 2017 12:42 PM    AST (SGOT) 20 2017 12:42 PM     Lab Results   Component Value Date/Time    Cholesterol, total 153 2017 12:42 PM    HDL Cholesterol 66 2017 12:42 PM    LDL, calculated 59 2017 12:42 PM    VLDL, calculated 28 2017 12:42 PM    Triglyceride 139 2017 12:42 PM     Lab Results   Component Value Date/Time    ALT (SGPT) 19 2017 12:42 PM    AST (SGOT) 20 2017 12:42 PM    Alk.  phosphatase 62 2017 12:42 PM    Bilirubin, total 0.3 2017 12:42 PM     Lab Results   Component Value Date/Time    Creatinine 0.97 07/25/2017 12:42 PM     Lab Results   Component Value Date/Time    BUN 13 07/25/2017 12:42 PM     No results found for: MCACR, MCA1, MCA2, MCA3, MCAU, MCAU2, MCALPOCT     Assessment:    Pt referred to nutrition counseling for assistance with weight loss. Pt stated she has struggled with weight all of her life, but is currently at her highest weight. Pt reported weight management became more difficult after the birth of her 2rd (out of 3) child, 11 years ago. Pt engaged, lives with shaq and 3 sons. Pt works 11a-7p at the [place]. Pt initiated an exercise routine last week with a  2d/wk for 1 hr each session. Pt's weight goal 170-180. She's motivated to make the necessary changes in her diet and lifestyle and she agreed to all recommendations provided. Food & Nutrition: B- [employer] + soda/sweet tea  L- fast food combo with soda/sweet tea  D- fried chicken, rice + soda/sweet tea  Sn- none or chips    Pt's diet high in carbohydrate and fat. She consumes more meals out than prepared at home. Due to her work schedule, pt's shaq prepares most of the dinners, which are high in fat and carbohydrate. Pt drinks soda and sweet tea throughout the day. She also admits to drinking alcohol on the weekends. Estimate Needs   Calories:  1400 Protein: 105 Carbs: 140 Fat: 47   Kcal/day  g/day  g/day  g/day        percent: 30  40  30               Nutrition Diagnosis Nutrition Diagnosis: Obesity related to increased PO intake and lack of activity as evidenced by high BMI and unhealthy diet and exercise recall. Nutrition Intervention &  Education: Provided macronutrient education, including optimal times to eat throughout the day and appropriate balance of macronutrients to promote more efficient RMR. Discussed the importance of developing a consistent lifestyle, including eating habits for long term weight loss and management. Provided pt with meal builder and diet plan.      Handouts Provided: [] Carbohydrates  []  Protein  []  Non-starchy Vegatbles  []  Food Label  []  Meal and Snack Ideas  []  Food Journals []  Diabetes  []  Cholesterol  []  Sodium  [x]  Meal Builder  [x]  Diet Plan  []  Others:   Information Reviewed with: pt   Readiness to Change Stage: []  Pre-contemplative    []  Contemplative  [x]  Preparation               []  Action                  []  Maintenance   Potential Barriers to Learning: []  Decline in memory    []  Language barrier   []  Other:  []  Emotional                  []  Limited mobility  []  Lack of motivation     [] Vision, hearing or cognitive impairment   Expected Compliance: / Gaye May  due to lack of support at home and lack of time to plan and prepare healthy meals and snacks in advance. Nutritional Goal - To promote lifestyle changes to result in:    [x]  Weight loss  []  Improved diabetic control  []  Decreased cholesterol levels  []  Decreased blood pressure  []  Weight maintenance []  Preventing any interactions associated with food allergies  []  Adequate weight gain toward goal weight  []  Other:        Patient Goals:   1. Always combine and balance carbohydrate and protein  2. Eat 2-3 hrs after snacks and 4-5 hrs after meals  3.  Drink only half sweetened beverages; dilute with diet/unsweet     Dietitian Signature: Chancy Angelucci, MS, RD Date: 9/22/2020   Follow-up: 10/22 at 10 Time: 1:42 PM

## 2020-10-13 ENCOUNTER — HOSPITAL ENCOUNTER (EMERGENCY)
Age: 35
Discharge: HOME | End: 2020-10-13
Attending: EMERGENCY MEDICINE
Payer: COMMERCIAL

## 2020-10-13 VITALS
HEIGHT: 66 IN | WEIGHT: 200 LBS | OXYGEN SATURATION: 100 % | TEMPERATURE: 98.4 F | RESPIRATION RATE: 16 BRPM | SYSTOLIC BLOOD PRESSURE: 138 MMHG | DIASTOLIC BLOOD PRESSURE: 78 MMHG | BODY MASS INDEX: 32.14 KG/M2

## 2020-10-13 DIAGNOSIS — S39.012A LUMBAR STRAIN, INITIAL ENCOUNTER: Primary | ICD-10-CM

## 2020-10-13 PROCEDURE — 99281 EMR DPT VST MAYX REQ PHY/QHP: CPT

## 2020-10-13 NOTE — LETTER
[address] [address] [place] [address] 
[phone] Work/School Note Date: 10/13/2020 To Whom It May concern: 
 
Jhonathan Quinones was seen and treated today in the emergency room by the following provider(s): 
Attending Provider: Boston Blas MD.   
 
Jhonathan Quinones Return to school/sport/work on 10/15/2020. Sincerely, 
 
Media Brock

## 2020-10-13 NOTE — ED TRIAGE NOTES
Pt c/o exacerbation of chronic intermittent ambulatory LBP x1 day, increases with movement. Denies any other symptoms.  States has had imaging in past, been told \"it was good\", diagnosed with muscle spasms, sent to PT.

## 2020-10-13 NOTE — ED PROVIDER NOTES
EMERGENCY DEPARTMENT HISTORY AND PHYSICAL EXAM      Date: 10/13/2020  Patient Name: [facility]    History of Presenting Illness     Chief Complaint   Patient presents with    Back Pain       History Provided By: Patient    HPI: [facility], 28 y.o. female with a past medical history significant low back pain,  muscle strain presents to the ED with cc of left-sided low back pain for one day. 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 Pain worsens with movement, but no focal weakness or numbness. No cough, fever, abdominal or chest pain, or urinary symptoms. There are no other complaints, changes, or physical findings at this time. PCP: Lorraine Frank MD    No current facility-administered medications on file prior to encounter. Current Outpatient Medications on File Prior to Encounter   Medication Sig Dispense Refill    [DISCONTINUED] pantoprazole (PROTONIX) 20 mg tablet Take 1 Tab by mouth daily. 90 Tab 1    [DISCONTINUED] polyethylene glycol (MIRALAX) 17 gram packet Take 1 Packet by mouth daily. 30 Packet 3       Past History     Past Medical History:  Past Medical History:   Diagnosis Date    Gastritis        Past Surgical History:  Past Surgical History:   Procedure Laterality Date    HX BREAST REDUCTION      HX TUBAL LIGATION         Family History:  No family history on file. Social History:  Social History     Tobacco Use    Smoking status: Current Every Day Smoker     Types: Cigarettes    Smokeless tobacco: Never Used   Substance Use Topics    Alcohol use: Yes     Comment: Occ    Drug use: No       Allergies:  No Known Allergies      Review of Systems   Review of Systems   Constitutional: Negative for fever. Respiratory: Negative for cough. Genitourinary: Negative for dysuria, hematuria, vaginal bleeding and vaginal pain. All other systems reviewed and are negative.       Physical Exam   Physical Exam  Vitals signs and nursing note reviewed. Constitutional:       Appearance: Normal appearance. She is not ill-appearing. HENT:      Head: Normocephalic and atraumatic. Nose: Nose normal.      Mouth/Throat:      Mouth: Mucous membranes are moist.      Pharynx: No oropharyngeal exudate or posterior oropharyngeal erythema. Eyes:      General: No scleral icterus. Extraocular Movements: Extraocular movements intact. Pupils: Pupils are equal, round, and reactive to light. Neck:      Musculoskeletal: Normal range of motion and neck supple. No neck rigidity. Cardiovascular:      Rate and Rhythm: Normal rate and regular rhythm. Pulses: Normal pulses. Heart sounds: Normal heart sounds. Pulmonary:      Effort: Pulmonary effort is normal.      Breath sounds: Normal breath sounds. No wheezing, rhonchi or rales. Abdominal:      General: Bowel sounds are normal.      Palpations: Abdomen is soft. Tenderness: There is no abdominal tenderness. There is no right CVA tenderness or left CVA tenderness. Musculoskeletal:      Right lower leg: No edema. Left lower leg: No edema. Comments: Patient with decreased trunk range of motion due to pain. Points to left paralumbar area as site of pain. There is no spasm or contusion noted. No skin changes. Skin:     General: Skin is warm and dry. Findings: No rash. Neurological:      General: No focal deficit present. Mental Status: She is alert and oriented to person, place, and time. Comments: 5/ 5 motor throughout, normal sensory to light touch throughout, no focal or lateralizing deficits. Normal ambulation. Psychiatric:         Mood and Affect: Mood normal.         Behavior: Behavior normal.         Diagnostic Study Results     Labs -   No results found for this or any previous visit (from the past 12 hour(s)).     Radiologic Studies -   No orders to display     CT Results  (Last 48 hours)    None        CXR Results  (Last 48 hours)    None            Medical Decision Making   I am the first provider for this patient. I reviewed the vital signs, available nursing notes, past medical history, past surgical history, family history and social history. Vital Signs-Reviewed the patient's vital signs. Patient Vitals for the past 12 hrs:   Temp Resp BP SpO2   10/13/20 1731 98.4 °F (36.9 °C) 16 138/78 100 %       Records Reviewed: Nursing Notes    Provider Notes (Medical Decision Making):       ED Course:   Initial assessment performed. The patients presenting problems have been discussed, and they are in agreement with the care plan formulated and outlined with them. I have encouraged them to ask questions as they arise throughout their visit. Imp: lumbar strain  Discussed over-the-counter medications as needed, ROM and  Heat prn. PLAN:  1. There are no discharge medications for this patient. 2.   Follow-up Information    None       Return to ED if worse     Diagnosis     Clinical Impression:   1.  Lumbar strain, initial encounter

## 2020-10-22 ENCOUNTER — HOSPITAL ENCOUNTER (OUTPATIENT)
Dept: NUTRITION | Age: 35
Discharge: HOME | End: 2020-10-22
Payer: COMMERCIAL

## 2020-10-22 PROCEDURE — 97803 MED NUTRITION INDIV SUBSEQ: CPT

## 2020-10-22 NOTE — PROGRESS NOTES
NUTRITION - FOLLOW-UP TREATMENT NOTE  Patient Name: Ethelle Schwab         Date: 10/22/2020  : 1985    YES/NO Patient  Verified  Diagnosis: obesity   In time:   1000             Out time:   1030   Total Treatment Time (min):   30     SUBJECTIVE/ASSESSMENT    Current Wt: 204 Previous Wt: 206.4 Wt Change: -2.4     Initial Wt:  Total Wt change:        Changes in medication or medical history? Any new allergies, surgeries or procedures?    /NO    If yes, update Summary List                Nutrition Diagnosis        Diagnosis Status: Nutrition Diagnosis: Obesity related to increased PO intake and lack of activity as evidenced by high BMI and unhealthy diet and exercise recall. [x]  Improved []  No Change    []  Declined        Nutrition Monitoring and Evaluation: Pt eliminated all sweetened drinks from her diet and switched to water only. She c/o the amount of food and frequency of eating with her new meal plan. Pt admitted she added de-tox powder to her water for the first week, but has not used it since. She has considered taking a diet pill (OTC) since her goal is to lose 20-25lbs by March. Discussed the implications of diet pills and the risk/reward. Pt agreed to hold off for now. She hired a  and is exercising 2d/wk. Discussed exercise goals for weight loss and stressed the importance of cardiovascular/endurance exercise rather than weight lifting for weight loss. Patient Education:  [x]  Review current plan with patient   []  Other:    Handouts/  Information Provided: []  Carbohydrates  []  Protein  []  Fiber  []  Serving Sizes  []  Fluids  []  General guidelines []  Diabetes  []  Cholesterol  []  Sodium  []  SBGM  []  Food Journals  []  Others:        Patient Goals 1. Set alarms on phone as reminders to eat at each scheduled meal/snack  2. Combine breakfast and AM snack, then eat again at lunchtime  3. Exercise goal: 300 min/week (5 days x 1 hr low intensity cardio)  4. Keep a food journal for more accountability     PLAN    [x]  Continue on current plan []  Follow-up PRN   []  Discharge due to :    [x]  Next appt: 12/10 at 10     Dietitian: Ingris Drake MS, RD    Date: 10/22/2020 Time: 10:32 AM

## 2021-12-14 ENCOUNTER — APPOINTMENT (OUTPATIENT)
Dept: GENERAL RADIOLOGY | Age: 36
End: 2021-12-14
Attending: EMERGENCY MEDICINE
Payer: MEDICAID

## 2021-12-14 ENCOUNTER — HOSPITAL ENCOUNTER (EMERGENCY)
Age: 36
Discharge: HOME | End: 2021-12-14
Attending: EMERGENCY MEDICINE
Payer: MEDICAID

## 2021-12-14 VITALS
DIASTOLIC BLOOD PRESSURE: 74 MMHG | WEIGHT: 195 LBS | OXYGEN SATURATION: 98 % | HEIGHT: 67 IN | RESPIRATION RATE: 22 BRPM | BODY MASS INDEX: 30.61 KG/M2 | TEMPERATURE: 98.6 F | HEART RATE: 88 BPM | SYSTOLIC BLOOD PRESSURE: 134 MMHG

## 2021-12-14 DIAGNOSIS — J20.9 ACUTE BRONCHITIS, UNSPECIFIED ORGANISM: Primary | ICD-10-CM

## 2021-12-14 PROCEDURE — 74011636637 HC RX REV CODE- 636/637: Performed by: EMERGENCY MEDICINE

## 2021-12-14 PROCEDURE — 74011250637 HC RX REV CODE- 250/637: Performed by: EMERGENCY MEDICINE

## 2021-12-14 PROCEDURE — 71045 X-RAY EXAM CHEST 1 VIEW: CPT

## 2021-12-14 PROCEDURE — 99283 EMERGENCY DEPT VISIT LOW MDM: CPT

## 2021-12-14 RX ORDER — AZITHROMYCIN 250 MG/1
TABLET, FILM COATED ORAL
Qty: 6 TABLET | Refills: 0 | Status: SHIPPED | OUTPATIENT
Start: 2021-12-14 | End: 2022-08-08 | Stop reason: ALTCHOICE

## 2021-12-14 RX ORDER — ACETAMINOPHEN 500 MG
1000 TABLET ORAL ONCE
Status: COMPLETED | OUTPATIENT
Start: 2021-12-14 | End: 2021-12-14

## 2021-12-14 RX ORDER — PREDNISONE 20 MG/1
TABLET ORAL
Qty: 12 TABLET | Refills: 0 | Status: SHIPPED | OUTPATIENT
Start: 2021-12-14 | End: 2022-08-08 | Stop reason: ALTCHOICE

## 2021-12-14 RX ORDER — ALBUTEROL SULFATE 90 UG/1
1 AEROSOL, METERED RESPIRATORY (INHALATION)
Qty: 6.7 G | Refills: 1 | Status: SHIPPED | OUTPATIENT
Start: 2021-12-14

## 2021-12-14 RX ORDER — IBUPROFEN 400 MG/1
400 TABLET ORAL ONCE
Status: COMPLETED | OUTPATIENT
Start: 2021-12-14 | End: 2021-12-14

## 2021-12-14 RX ORDER — PREDNISONE 20 MG/1
60 TABLET ORAL ONCE
Status: COMPLETED | OUTPATIENT
Start: 2021-12-14 | End: 2021-12-14

## 2021-12-14 RX ADMIN — PREDNISONE 60 MG: 20 TABLET ORAL at 07:28

## 2021-12-14 RX ADMIN — IBUPROFEN 400 MG: 400 TABLET, FILM COATED ORAL at 07:28

## 2021-12-14 RX ADMIN — ACETAMINOPHEN 1000 MG: 500 TABLET ORAL at 07:28

## 2021-12-14 NOTE — ED PROVIDER NOTES
EMERGENCY DEPARTMENT HISTORY AND PHYSICAL EXAM      Date: 12/14/2021  Patient Name: [facility]    History of Presenting Illness     Chief Complaint   Patient presents with    Nasal Congestion    Cough    Sore Throat       History Provided By: Patient    HPI: [facility], 39 y.o. female   presents to the ED with cc of cough. Patient complains of productive cough with green sputum for last several days. Is associated nasal congestion with postnasal drip and mild sore throat. No loss of sense of smell or taste. No generalized body ache. No headache. No fever chills. No diarrhea. No obvious exposure to COVID-19. Patient has received a flu shot but has not received Covid vaccination. PCP: Iliana Tam MD    No current facility-administered medications on file prior to encounter. No current outpatient medications on file prior to encounter. Past History     Past Medical History:  Past Medical History:   Diagnosis Date    Gastritis        Past Surgical History:  Past Surgical History:   Procedure Laterality Date    HX BREAST REDUCTION      HX TUBAL LIGATION         Family History:  No family history on file. Social History:  Social History     Tobacco Use    Smoking status: Former Smoker     Types: Cigarettes    Smokeless tobacco: Never Used   Vaping Use    Vaping Use: Never used   Substance Use Topics    Alcohol use: Yes     Comment: Occ    Drug use: No       Allergies:  No Known Allergies      Review of Systems   Review of Systems   Constitutional: Negative for activity change, appetite change, chills and fever. HENT: Positive for sore throat. Eyes: Negative for discharge. Respiratory: Positive for cough. Negative for shortness of breath. Cardiovascular: Negative for chest pain. Gastrointestinal: Negative for nausea. Endocrine: Negative for polyuria. Genitourinary: Negative for difficulty urinating. Musculoskeletal: Negative for arthralgias. Skin: Negative for rash. Neurological: Negative for headaches. Hematological: Negative for adenopathy. Psychiatric/Behavioral: Negative for suicidal ideas. All other systems reviewed and are negative. Physical Exam   Physical Exam  Vitals and nursing note reviewed. Constitutional:       Appearance: Normal appearance. HENT:      Head: Normocephalic and atraumatic. Nose: Nose normal.      Mouth/Throat:      Mouth: Mucous membranes are moist.      Pharynx: Oropharynx is clear. No oropharyngeal exudate or posterior oropharyngeal erythema. Eyes:      Conjunctiva/sclera: Conjunctivae normal.   Cardiovascular:      Rate and Rhythm: Normal rate and regular rhythm. Heart sounds: Normal heart sounds. Pulmonary:      Effort: Pulmonary effort is normal.      Breath sounds: Normal breath sounds. Abdominal:      General: Abdomen is flat. Bowel sounds are normal.      Palpations: Abdomen is soft. Musculoskeletal:      Cervical back: Neck supple. Right lower leg: No edema. Left lower leg: No edema. Skin:     General: Skin is warm and dry. Neurological:      General: No focal deficit present. Mental Status: She is alert and oriented to person, place, and time. Psychiatric:         Mood and Affect: Mood normal.         Diagnostic Study Results     Labs -   No results found for this or any previous visit (from the past 12 hour(s)). Radiologic Studies -   XR CHEST PORT    (Results Pending)     CT Results  (Last 48 hours)    None        CXR Results  (Last 48 hours)    None            Medical Decision Making   I am the first provider for this patient. I reviewed the vital signs, available nursing notes, past medical history, past surgical history, family history and social history. Vital Signs-Reviewed the patient's vital signs.   Patient Vitals for the past 12 hrs:   Temp Pulse Resp BP SpO2   12/14/21 0634 98.6 °F (37 °C) 88 22 134/74 98 %   12/14/21 0627 98.6 °F (37 °C) -- -- -- --       Records Reviewed:     Provider Notes (Medical Decision Making):       ED Course:   Initial assessment performed. The patients presenting problems have been discussed, and they are in agreement with the care plan formulated and outlined with them. I have encouraged them to ask questions as they arise throughout their visit. PROCEDURES      Disposition: Condition stable   DC- Adult Discharges: All of the diagnostic tests were reviewed and questions answered. Diagnosis, care plan and treatment options were discussed. understand instructions and will follow up as directed. The patients results have been reviewed with them. They have been counseled regarding their diagnosis. The patient verbally convey understanding and agreement of the signs, symptoms, diagnosis, treatment and prognosis and additionally agrees to follow up as recommended. They also agree with the care-plan and convey that all of their questions have been answered. I have also put together some discharge instructions for them that include: 1) educational information regarding their diagnosis, 2) how to care for their diagnosis at home, as well a 3) list of reasons why they would want to return to the ED prior to their follow-up appointment, should their condition change. PLAN:  1. Current Discharge Medication List      START taking these medications    Details   albuterol (PROVENTIL HFA, VENTOLIN HFA, PROAIR HFA) 90 mcg/actuation inhaler Take 1 Puff by inhalation every four (4) hours as needed for Wheezing. Qty: 6.7 g, Refills: 1  Start date: 12/14/2021      predniSONE (DELTASONE) 20 mg tablet 3 tablets for 2 days then 2 tablets for 2 days then 1 tablet for 2 day  Qty: 12 Tablet, Refills: 0  Start date: 12/14/2021      azithromycin (Zithromax Z-Marko) 250 mg tablet As instructed in the pack  Qty: 6 Tablet, Refills: 0  Start date: 12/14/2021           2.    Follow-up Information     Follow up With Specialties Details Why Contact Info    Follow up with your primary care physician  Schedule an appointment as soon as possible for a visit in 3 days As needed         Return to ED if worse     Diagnosis     Clinical Impression:   1. Acute bronchitis, unspecified organism        Please note that this dictation was completed with [employer], the computer voice recognition software. Quite often unanticipated grammatical, syntax, homophones, and other interpretive errors are inadvertently transcribed by the computer software. Please disregard these errors. Please excuse any errors that have escaped final proofreading. Thank you.

## 2021-12-14 NOTE — ED TRIAGE NOTES
Pt here for persistent cough and UR congestion.   Denies ill contacts  States that she has been feeling bad for a week  Recently had a negative flu test.  AO x's 4

## 2021-12-14 NOTE — Clinical Note
[address] [facility] EMERGENCY DEPARTMENT  [address] [id number] [phone]    Work/School Note    Date: 12/14/2021    To Whom It May concern:    [facility] was seen and treated today in the emergency room by the following provider(s):  Attending Provider: Pastora Lucio MD.      [facility] is excused from work/school on 12/14/21 and 12/15/21. She is medically clear to return to work/school on 12/16/2021.        Sincerely,          Vicenta Ryan MD

## 2022-03-18 PROBLEM — Z72.0 TOBACCO ABUSE: Status: ACTIVE | Noted: 2017-02-01

## 2022-03-18 PROBLEM — K21.00 GASTROESOPHAGEAL REFLUX DISEASE WITH ESOPHAGITIS: Status: ACTIVE | Noted: 2017-02-01

## 2022-03-19 PROBLEM — F41.9 ANXIETY: Status: ACTIVE | Noted: 2017-02-01

## 2022-03-19 PROBLEM — E66.9 OBESITY (BMI 30-39.9): Status: ACTIVE | Noted: 2017-12-05

## 2022-08-08 ENCOUNTER — OFFICE VISIT (OUTPATIENT)
Dept: FAMILY MEDICINE CLINIC | Age: 37
End: 2022-08-08
Payer: MEDICAID

## 2022-08-08 VITALS
SYSTOLIC BLOOD PRESSURE: 129 MMHG | OXYGEN SATURATION: 100 % | BODY MASS INDEX: 31.71 KG/M2 | DIASTOLIC BLOOD PRESSURE: 71 MMHG | RESPIRATION RATE: 18 BRPM | WEIGHT: 202 LBS | HEIGHT: 67 IN | HEART RATE: 69 BPM | TEMPERATURE: 98.7 F

## 2022-08-08 DIAGNOSIS — Z13.31 SCREENING FOR DEPRESSION: ICD-10-CM

## 2022-08-08 DIAGNOSIS — Z00.00 GENERAL MEDICAL EXAM: Primary | ICD-10-CM

## 2022-08-08 DIAGNOSIS — Z11.59 NEED FOR HEPATITIS C SCREENING TEST: ICD-10-CM

## 2022-08-08 DIAGNOSIS — E66.9 OBESITY (BMI 30-39.9): ICD-10-CM

## 2022-08-08 DIAGNOSIS — L73.2 HIDRADENITIS AXILLARIS: ICD-10-CM

## 2022-08-08 PROCEDURE — 99395 PREV VISIT EST AGE 18-39: CPT | Performed by: FAMILY MEDICINE

## 2022-08-08 PROCEDURE — 1220F PT SCREENED FOR DEPRESSION: CPT | Performed by: FAMILY MEDICINE

## 2022-08-08 RX ORDER — MUPIROCIN 20 MG/G
OINTMENT TOPICAL DAILY
Qty: 22 G | Refills: 0 | Status: SHIPPED | OUTPATIENT
Start: 2022-08-08

## 2022-08-08 RX ORDER — PHENTERMINE HYDROCHLORIDE 37.5 MG/1
37.5 TABLET ORAL
Qty: 30 TABLET | Refills: 0 | Status: SHIPPED | OUTPATIENT
Start: 2022-08-08

## 2022-08-08 NOTE — PROGRESS NOTES
[facility] is in for follow-up care. General medical exam: Patient would like to have a complete physical exam.  Physical exam is stable. I will check labs. Axillary swelling: Patient has hidradenitis axillaris. She had gets axillary swellings at a painful. These are boils that opened up and drained some purulent material.  This resulted in scar tissue. She would like to see the dermatologist for this. I will also give mupirocin cream to apply. Obesity: Patient has a BMI of 31.64. She would like to try medication to help with weight loss. We discussed management options. I will give phentermine. She will intensify lifestyle and dietary modification. I will follow-up in a month. Health maintenance: I will check hep C antibody to screen for hepatitis C. Past Medical History  Past Medical History:   Diagnosis Date    Gastritis        Surgical History  Past Surgical History:   Procedure Laterality Date    HX BREAST REDUCTION      HX TUBAL LIGATION          Medications  Current Outpatient Medications   Medication Sig Dispense Refill    mupirocin (BACTROBAN) 2 % ointment Apply  to affected area daily. 22 g 0    phentermine (ADIPEX-P) 37.5 mg tablet Take 1 Tablet by mouth every morning. Max Daily Amount: 37.5 mg. 30 Tablet 0    albuterol (PROVENTIL HFA, VENTOLIN HFA, PROAIR HFA) 90 mcg/actuation inhaler Take 1 Puff by inhalation every four (4) hours as needed for Wheezing. 6.7 g 1    predniSONE (DELTASONE) 20 mg tablet 3 tablets for 2 days then 2 tablets for 2 days then 1 tablet for 2 day 12 Tablet 0    azithromycin (Zithromax Z-Marko) 250 mg tablet As instructed in the pack 6 Tablet 0       Allergies  No Known Allergies    Family History  History reviewed. No pertinent family history.     Social History  Social History     Socioeconomic History    Marital status: SINGLE     Spouse name: Not on file    Number of children: Not on file    Years of education: Not on file    Highest education level: Not on file   Occupational History    Occupation: Unemployed   Tobacco Use    Smoking status: Former     Types: Cigarettes    Smokeless tobacco: Never   Vaping Use    Vaping Use: Never used   Substance and Sexual Activity    Alcohol use: Yes     Comment: Occ    Drug use: No    Sexual activity: Yes     Birth control/protection: Surgical   Other Topics Concern     Service No    Blood Transfusions No    Caffeine Concern No    Occupational Exposure No    Hobby Hazards No    Sleep Concern No    Stress Concern No    Weight Concern No    Special Diet No    Back Care No    Exercise No    Bike Helmet No    Seat Belt Yes    Self-Exams Yes   Social History Narrative    Not on file     Social Determinants of Health     Financial Resource Strain: Not on file   Food Insecurity: Not on file   Transportation Needs: Not on file   Physical Activity: Not on file   Stress: Not on file   Social Connections: Not on file   Intimate Partner Violence: Not on file   Housing Stability: Not on file       Review of Systems  Review of Systems - Review of all systems is negative except as noted above in the HPI.     Vital Signs  Visit Vitals  /71 (BP 1 Location: Left upper arm, BP Patient Position: Sitting, BP Cuff Size: Adult)   Pulse 69   Temp 98.7 °F (37.1 °C) (Temporal)   Resp 18   Ht 5' 7\" (1.702 m)   Wt 202 lb (91.6 kg)   LMP 08/02/2022   SpO2 100%   BMI 31.64 kg/m²         Physical Exam  Physical Examination: General appearance - alert, well appearing, and in no distress, oriented to person, place, and time, overweight, acyanotic, in no respiratory distress, and well hydrated  Mental status - alert, oriented to person, place, and time, normal mood, behavior, speech, dress, motor activity, and thought processes  Lymphatics - no palpable lymphadenopathy, no hepatosplenomegaly  Chest - clear to auscultation, no wheezes, rales or rhonchi, symmetric air entry  Heart - normal rate, regular rhythm, normal S1, S2, no murmurs, rubs, clicks or gallops  Abdomen - soft, nontender, nondistended, no masses or organomegaly  Back exam - full range of motion, no tenderness, palpable spasm or pain on motion  Neurological - alert, oriented, normal speech, no focal findings or movement disorder noted  Musculoskeletal - no joint tenderness, deformity or swelling  Extremities - no pedal edema noted, intact peripheral pulses  Skin -hidradenitis axillaris lesions right and left axillary areas. Results  Results for orders placed or performed in visit on 27/64/31   METABOLIC PANEL, COMPREHENSIVE   Result Value Ref Range    Glucose 85 65 - 99 mg/dL    BUN 13 6 - 20 mg/dL    Creatinine 0.97 0.57 - 1.00 mg/dL    GFR est non-AA 78 >59 mL/min/1.73    GFR est AA 89 >59 mL/min/1.73    BUN/Creatinine ratio 13 9 - 23    Sodium 138 134 - 144 mmol/L    Potassium 4.5 3.5 - 5.2 mmol/L    Chloride 101 96 - 106 mmol/L    CO2 21 18 - 29 mmol/L    Calcium 9.2 8.7 - 10.2 mg/dL    Protein, total 6.7 6.0 - 8.5 g/dL    Albumin 4.2 3.5 - 5.5 g/dL    GLOBULIN, TOTAL 2.5 1.5 - 4.5 g/dL    A-G Ratio 1.7 1.2 - 2.2    Bilirubin, total 0.3 0.0 - 1.2 mg/dL    Alk. phosphatase 62 39 - 117 IU/L    AST (SGOT) 20 0 - 40 IU/L    ALT (SGPT) 19 0 - 32 IU/L   LIPID PANEL   Result Value Ref Range    Cholesterol, total 153 100 - 199 mg/dL    Triglyceride 139 0 - 149 mg/dL    HDL Cholesterol 66 >39 mg/dL    VLDL, calculated 28 5 - 40 mg/dL    LDL, calculated 59 0 - 99 mg/dL   CBC WITH AUTOMATED DIFF   Result Value Ref Range    WBC 7.9 3.4 - 10.8 x10E3/uL    RBC 4.23 3.77 - 5.28 x10E6/uL    HGB 12.4 11.1 - 15.9 g/dL    HCT 38.8 34.0 - 46.6 %    MCV 92 79 - 97 fL    MCH 29.3 26.6 - 33.0 pg    MCHC 32.0 31.5 - 35.7 g/dL    RDW 14.5 12.3 - 15.4 %    PLATELET 991 356 - 127 x10E3/uL    NEUTROPHILS 66 %    Lymphocytes 29 %    MONOCYTES 4 %    EOSINOPHILS 1 %    BASOPHILS 0 %    ABS. NEUTROPHILS 5.1 1.4 - 7.0 x10E3/uL    Abs Lymphocytes 2.3 0.7 - 3.1 x10E3/uL    ABS.  MONOCYTES 0.4 0.1 - 0.9 x10E3/uL    ABS. EOSINOPHILS 0.1 0.0 - 0.4 x10E3/uL    ABS. BASOPHILS 0.0 0.0 - 0.2 x10E3/uL    IMMATURE GRANULOCYTES 0 %    ABS. IMM. GRANS. 0.0 0.0 - 0.1 x10E3/uL   CVD REPORT   Result Value Ref Range    INTERPRETATION Note    AMB POC URINALYSIS DIP STICK MANUAL W/O MICRO   Result Value Ref Range    Color (UA POC) Yellow     Clarity (UA POC) Clear     Glucose (UA POC) Negative Negative    Bilirubin (UA POC) Negative Negative    Ketones (UA POC) Negative Negative    Specific gravity (UA POC) 1.015 1.001 - 1.035    Blood (UA POC) Negative Negative    pH (UA POC) 6.0 4.6 - 8.0    Protein (UA POC) Negative Negative mg/dL    Urobilinogen (UA POC) 0.2 mg/dL 0.2 - 1    Nitrites (UA POC) Negative Negative    Leukocyte esterase (UA POC) Negative Negative       ASSESSMENT and PLAN  1    ICD-10-CM ICD-9-CM    1. General medical exam  Z00.00 V70.9 LIPID PANEL      METABOLIC PANEL, COMPREHENSIVE      CBC WITH AUTOMATED DIFF      2. Hidradenitis axillaris  L73.2 705.83 REFERRAL TO DERMATOLOGY      mupirocin (BACTROBAN) 2 % ointment      3. Obesity (BMI 30-39. 9)  E66.9 278.00 phentermine (ADIPEX-P) 37.5 mg tablet      4. Need for hepatitis C screening test  Z11.59 V73.89 HEPATITIS C AB      5. Screening for depression  Z13.31 V79.0 VA PATIENT SCREENED FOR DEPRESSION        lab results and schedule of future lab studies reviewed with patient  reviewed diet, exercise and weight control  reviewed medications and side effects in detail      I have discussed the diagnosis with the patient and the intended plan of care as seen in the above orders. The patient has received an after-visit summary and questions were answered concerning future plans. I have discussed medication, side effects, and warnings with the patient in detail. The patient verbalized understanding and is in agreement with the plan of care. The patient will contact the office with any additional concerns.     Arpan Drew MD    PLEASE NOTE:   This document has been produced using voice recognition software.  Unrecognized errors in transcription may be present

## 2022-08-08 NOTE — PROGRESS NOTES
Chief Complaint   Patient presents with    Physical     1. \"Have you been to the ER, urgent care clinic since your last visit? Hospitalized since your last visit? \" No    2. \"Have you seen or consulted any other health care providers outside of the [address] [place] [address] since your last visit? \" No     3. For patients aged 39-70: Has the patient had a colonoscopy / FIT/ Cologuard? NA - based on age      If the patient is female:    4. For patients aged 41-77: Has the patient had a mammogram within the past 2 years? Yes - no Care Gap present      5. For patients aged 21-65: Has the patient had a pap smear?  Yes - no Care Gap present

## 2022-09-29 ENCOUNTER — TELEPHONE (OUTPATIENT)
Dept: FAMILY MEDICINE CLINIC | Age: 37
End: 2022-09-29

## 2022-09-29 NOTE — TELEPHONE ENCOUNTER
Spoke with patient and advised her that as soon as he resulted them we would be in touch.   Patient verbalized understanding

## 2022-09-29 NOTE — TELEPHONE ENCOUNTER
Pt stated she was concerned about her lab results and would like to speak with the clinical staff about it.  Please follow up when available to discuss

## 2022-09-30 ENCOUNTER — TELEPHONE (OUTPATIENT)
Dept: FAMILY MEDICINE CLINIC | Age: 37
End: 2022-09-30

## 2022-10-03 ENCOUNTER — TELEPHONE (OUTPATIENT)
Dept: FAMILY MEDICINE CLINIC | Age: 37
End: 2022-10-03

## 2022-10-04 RX ORDER — CEPHALEXIN 500 MG/1
500 CAPSULE ORAL 3 TIMES DAILY
Qty: 15 CAPSULE | Refills: 0 | Status: SHIPPED | OUTPATIENT
Start: 2022-10-04 | End: 2022-10-09

## 2022-10-05 DIAGNOSIS — D64.9 ANEMIA, UNSPECIFIED TYPE: Primary | ICD-10-CM

## 2022-10-05 NOTE — TELEPHONE ENCOUNTER
Patient walked in and gave the office a copy of her CBC that was drawn from her employer. Patient is concerned over the tests marked with flags.   Would like provider to review and advise    Lab results given to provider

## 2022-11-16 ENCOUNTER — VIRTUAL VISIT (OUTPATIENT)
Dept: FAMILY MEDICINE CLINIC | Age: 37
End: 2022-11-16
Payer: MEDICAID

## 2022-11-16 DIAGNOSIS — D64.9 ANEMIA, UNSPECIFIED TYPE: ICD-10-CM

## 2022-11-16 DIAGNOSIS — E66.9 OBESITY (BMI 30-39.9): Primary | ICD-10-CM

## 2022-11-16 DIAGNOSIS — R73.9 HYPERGLYCEMIA: ICD-10-CM

## 2022-11-16 DIAGNOSIS — R80.9 PROTEINURIA, UNSPECIFIED TYPE: ICD-10-CM

## 2022-11-16 PROCEDURE — 99213 OFFICE O/P EST LOW 20 MIN: CPT | Performed by: FAMILY MEDICINE

## 2022-11-16 RX ORDER — PHENTERMINE HYDROCHLORIDE 37.5 MG/1
37.5 TABLET ORAL
Qty: 30 TABLET | Refills: 0 | Status: SHIPPED | OUTPATIENT
Start: 2022-11-16

## 2022-11-16 RX ORDER — DEXTROMETHORPHAN HYDROBROMIDE, GUAIFENESIN 5; 100 MG/5ML; MG/5ML
650 LIQUID ORAL
COMMUNITY

## 2022-11-16 NOTE — PROGRESS NOTES
Jonas Mullen is a 40 y.o. female who was seen by synchronous (real-time) audio-video technology on 11/16/2022 for Results and Medication Evaluation        Assessment & Plan:       ICD-10-CM ICD-9-CM    1. Obesity (BMI 30-39. 9)  E66.9 278.00 phentermine (ADIPEX-P) 37.5 mg tablet      2. Hyperglycemia  R73.9 790.29 HEMOGLOBIN A1C WITH EAG      3. Proteinuria, unspecified type  R80.9 791.0 URINALYSIS W/MICROSCOPIC      4. Anemia, unspecified type  D64.9 285.9 CBC WITH AUTOMATED DIFF        Subjective:   Jonas Mullen is seen for follow-up care. Obesity: Patient has obesity. I had prescribed some phentermine in the past.  She used the medication. No side effects. Her weight remains the same. She has been off the medication for some weeks. We discussed the use of medication and the need to come in for follow-up care. I will send in another prescription for the phentermine. She will intensify lifestyle and dietary modification. I will follow-up in the clinic at next visit. Anemia: Patient noted to have anemia on previous CBC. We will recheck labs. She denies weakness, malaise or fatigue. Proteinuria: Patient had protein in her urine on previous test.  At the time she had just that having a period. We will recheck a urinalysis. Hyperglycemia: Patient noted to have elevated blood glucose. We will check HbA1c. Prior to Admission medications    Medication Sig Start Date End Date Taking? Authorizing Provider   acetaminophen (TYLENOL) 650 mg TbER Take 650 mg by mouth every eight (8) hours as needed. Yes Provider, Historical   phentermine (ADIPEX-P) 37.5 mg tablet Take 1 Tablet by mouth every morning. Max Daily Amount: 37.5 mg. 11/16/22  Yes Arpan Rojo MD   mupirocin (BACTROBAN) 2 % ointment Apply  to affected area daily. Patient not taking: Reported on 11/16/2022 8/8/22 11/16/22  Arpan Rojo MD   phentermine (ADIPEX-P) 37.5 mg tablet Take 1 Tablet by mouth every morning.  Max Daily Amount: 37.5 mg. Patient not taking: Reported on 11/16/2022 8/8/22 11/16/22  Arpan Rojo MD   albuterol (PROVENTIL HFA, VENTOLIN HFA, PROAIR HFA) 90 mcg/actuation inhaler Take 1 Puff by inhalation every four (4) hours as needed for Wheezing. Patient not taking: Reported on 11/16/2022 12/14/21 11/16/22  MD VIKRAM Sandy Review of all systems is negative except as noted above in the HPI. Objective:     Patient-Reported Vitals 11/16/2022   Patient-Reported Weight 202   Patient-Reported LMP 10-   Constitutional: [x] Appears well-developed and well-nourished [x] No apparent distress     Mental status: [x] Alert and awake  [x] Oriented to person/place/time [x] Able to follow commands      HENT: [x] Normocephalic, atraumatic     Pulmonary/Chest: [x] Respiratory effort normal   [x] No visualized signs of difficulty breathing or respiratory distress    Neurological:        [x] No Facial Asymmetry (Cranial nerve 7 motor function) (limited exam due to video visit)                      Psychiatric:       [x] Normal Affect     We discussed the expected course, resolution and complications of the diagnosis(es) in detail. Medication risks, benefits, costs, interactions, and alternatives were discussed as indicated. I advised her to contact the office if her condition worsens, changes or fails to improve as anticipated. She expressed understanding with the diagnosis(es) and plan. [facility], was evaluated through a synchronous (real-time) audio-video encounter. The patient (or guardian if applicable) is aware that this is a billable service, which includes applicable co-pays. This Virtual Visit was conducted with patient's (and/or legal guardian's) consent. The visit was conducted pursuant to the emergency declaration under the [address] [place] [address], [address] [place] [address] authority and the [employer] General Act.   Patient identification was verified, and a caregiver was present when appropriate. The patient was located at: Home: [address] [place] [phone]  The provider was located at:  Facility (Appt Department): [address] MD

## 2022-11-16 NOTE — PROGRESS NOTES
1. \"Have you been to the ER, urgent care clinic since your last visit? Hospitalized since your last visit? \" No    2. \"Have you seen or consulted any other health care providers outside of the [address] [place] [address] since your last visit? \" No     3. For patients aged 39-70: Has the patient had a colonoscopy / FIT/ Cologuard? NA - based on age      If the patient is female:    4. For patients aged 41-77: Has the patient had a mammogram within the past 2 years? NA - based on age or sex      11. For patients aged 21-65: Has the patient had a pap smear?  Yes - no Care Gap present

## 2022-12-03 LAB
APPEARANCE UR: CLEAR
BACTERIA #/AREA URNS HPF: NORMAL /[HPF]
BASOPHILS # BLD AUTO: 0 X10E3/UL (ref 0–0.2)
BASOPHILS NFR BLD AUTO: 1 %
BILIRUB UR QL STRIP: NEGATIVE
CASTS URNS QL MICRO: NORMAL /LPF
COLOR UR: YELLOW
EOSINOPHIL # BLD AUTO: 0.1 X10E3/UL (ref 0–0.4)
EOSINOPHIL NFR BLD AUTO: 1 %
EPI CELLS #/AREA URNS HPF: NORMAL /HPF (ref 0–10)
ERYTHROCYTE [DISTWIDTH] IN BLOOD BY AUTOMATED COUNT: 14.2 % (ref 11.7–15.4)
EST. AVERAGE GLUCOSE BLD GHB EST-MCNC: 117 MG/DL
GLUCOSE UR QL STRIP: NEGATIVE
HBA1C MFR BLD: 5.7 % (ref 4.8–5.6)
HCT VFR BLD AUTO: 34.4 % (ref 34–46.6)
HGB BLD-MCNC: 11.2 G/DL (ref 11.1–15.9)
HGB UR QL STRIP: NEGATIVE
IMM GRANULOCYTES # BLD AUTO: 0 X10E3/UL (ref 0–0.1)
IMM GRANULOCYTES NFR BLD AUTO: 0 %
KETONES UR QL STRIP: NEGATIVE
LEUKOCYTE ESTERASE UR QL STRIP: NEGATIVE
LYMPHOCYTES # BLD AUTO: 2 X10E3/UL (ref 0.7–3.1)
LYMPHOCYTES NFR BLD AUTO: 37 %
MCH RBC QN AUTO: 28.4 PG (ref 26.6–33)
MCHC RBC AUTO-ENTMCNC: 32.6 G/DL (ref 31.5–35.7)
MCV RBC AUTO: 87 FL (ref 79–97)
MICRO URNS: NORMAL
MICRO URNS: NORMAL
MONOCYTES # BLD AUTO: 0.4 X10E3/UL (ref 0.1–0.9)
MONOCYTES NFR BLD AUTO: 8 %
NEUTROPHILS # BLD AUTO: 2.9 X10E3/UL (ref 1.4–7)
NEUTROPHILS NFR BLD AUTO: 53 %
NITRITE UR QL STRIP: NEGATIVE
PH UR STRIP: 6 [PH] (ref 5–7.5)
PLATELET # BLD AUTO: 247 X10E3/UL (ref 150–450)
PROT UR QL STRIP: NEGATIVE
RBC # BLD AUTO: 3.95 X10E6/UL (ref 3.77–5.28)
RBC #/AREA URNS HPF: NORMAL /HPF (ref 0–2)
SP GR UR STRIP: 1.02 (ref 1–1.03)
UROBILINOGEN UR STRIP-MCNC: 0.2 MG/DL (ref 0.2–1)
WBC # BLD AUTO: 5.4 X10E3/UL (ref 3.4–10.8)
WBC #/AREA URNS HPF: NORMAL /HPF (ref 0–5)

## 2023-01-16 DIAGNOSIS — D64.9 ANEMIA, UNSPECIFIED TYPE: ICD-10-CM

## 2023-01-16 DIAGNOSIS — R73.9 HYPERGLYCEMIA: ICD-10-CM

## 2023-01-16 DIAGNOSIS — R80.9 PROTEINURIA, UNSPECIFIED TYPE: ICD-10-CM

## 2023-04-24 DIAGNOSIS — E66.9 OBESITY, UNSPECIFIED: ICD-10-CM

## 2023-04-26 RX ORDER — PHENTERMINE HYDROCHLORIDE 37.5 MG/1
TABLET ORAL
Qty: 30 TABLET | Refills: 0 | OUTPATIENT
Start: 2023-04-26

## 2023-08-22 ENCOUNTER — HOSPITAL ENCOUNTER (EMERGENCY)
Facility: HOSPITAL | Age: 38
Discharge: HOME | End: 2023-08-22
Payer: COMMERCIAL

## 2023-08-22 VITALS
WEIGHT: 207 LBS | HEART RATE: 77 BPM | BODY MASS INDEX: 33.27 KG/M2 | OXYGEN SATURATION: 100 % | RESPIRATION RATE: 18 BRPM | SYSTOLIC BLOOD PRESSURE: 150 MMHG | TEMPERATURE: 98.7 F | DIASTOLIC BLOOD PRESSURE: 95 MMHG | HEIGHT: 66 IN

## 2023-08-22 DIAGNOSIS — B34.9 VIRAL ILLNESS: Primary | ICD-10-CM

## 2023-08-22 PROCEDURE — 99283 EMERGENCY DEPT VISIT LOW MDM: CPT

## 2023-08-22 RX ORDER — IBUPROFEN 600 MG/1
600 TABLET ORAL EVERY 6 HOURS PRN
Qty: 20 TABLET | Refills: 0 | Status: SHIPPED | OUTPATIENT
Start: 2023-08-22

## 2023-08-22 RX ORDER — ACETAMINOPHEN 500 MG
1000 TABLET ORAL EVERY 6 HOURS PRN
Qty: 40 TABLET | Refills: 0 | Status: SHIPPED | OUTPATIENT
Start: 2023-08-22

## 2023-08-22 ASSESSMENT — LIFESTYLE VARIABLES
HOW OFTEN DO YOU HAVE A DRINK CONTAINING ALCOHOL: NEVER
HOW MANY STANDARD DRINKS CONTAINING ALCOHOL DO YOU HAVE ON A TYPICAL DAY: PATIENT DOES NOT DRINK

## 2023-08-22 NOTE — ED PROVIDER NOTES
[facility] EMERGENCY DEPT  EMERGENCY DEPARTMENT HISTORY AND PHYSICAL EXAM      Date: 8/22/2023  Patient Name: Nirav Sprague  MRN: [id number]  [facility] 1985  Date of evaluation: 8/22/2023  Provider: ANASTACIA Diaz NP   Note Started: 12:11 PM EDT 8/22/23    HISTORY OF PRESENT ILLNESS     Chief Complaint   Patient presents with    Nasal Congestion    Generalized Body Aches    Fatigue       History Provided By: Patient    HPI: Nirav Sprague is a 45 y.o. female with past medical history as listed below, presents ambulatory to the emergency department with 4 days of nausea, fatigue, headache, body aches, nasal congestion and subjective fever. Denies chest pain, difficulty breathing, abdominal pain, vomiting, diarrhea, urinary symptoms, and there is no concern for pregnancy. Upon arrival to the ED pt is alert and oriented x 3, well-appearing, and interacting appropriately; no obvious distress noted. PAST MEDICAL HISTORY   Past Medical History:  Past Medical History:   Diagnosis Date    Gastritis        Past Surgical History:  Past Surgical History:   Procedure Laterality Date    BREAST REDUCTION SURGERY      TUBAL LIGATION         Family History:  History reviewed. No pertinent family history. Social History:  Social History     Tobacco Use    Smoking status: Former    Smokeless tobacco: Never   Substance Use Topics    Alcohol use: Yes    Drug use: No       Allergies:  No Known Allergies    PCP: Bhargav Morley MD    Current Meds:   No current facility-administered medications for this encounter. Current Outpatient Medications   Medication Sig Dispense Refill    acetaminophen (TYLENOL) 500 MG tablet Take 2 tablets by mouth every 6 hours as needed for Pain 40 tablet 0    ibuprofen (ADVIL;MOTRIN) 600 MG tablet Take 1 tablet by mouth every 6 hours as needed for Pain 20 tablet 0    phentermine (ADIPEX-P) 37.5 MG tablet Take 37.5 mg by mouth.          Social Determinants of Health:   Social

## 2023-09-18 ENCOUNTER — TELEMEDICINE (OUTPATIENT)
Facility: CLINIC | Age: 38
End: 2023-09-18
Payer: COMMERCIAL

## 2023-09-18 DIAGNOSIS — L02.91 ABSCESS: ICD-10-CM

## 2023-09-18 DIAGNOSIS — E66.9 OBESITY (BMI 30-39.9): Primary | ICD-10-CM

## 2023-09-18 PROCEDURE — 99213 OFFICE O/P EST LOW 20 MIN: CPT | Performed by: FAMILY MEDICINE

## 2023-09-18 ASSESSMENT — PATIENT HEALTH QUESTIONNAIRE - PHQ9
SUM OF ALL RESPONSES TO PHQ QUESTIONS 1-9: 0
SUM OF ALL RESPONSES TO PHQ QUESTIONS 1-9: 0
6. FEELING BAD ABOUT YOURSELF - OR THAT YOU ARE A FAILURE OR HAVE LET YOURSELF OR YOUR FAMILY DOWN: 0
SUM OF ALL RESPONSES TO PHQ QUESTIONS 1-9: 0
4. FEELING TIRED OR HAVING LITTLE ENERGY: 0
8. MOVING OR SPEAKING SO SLOWLY THAT OTHER PEOPLE COULD HAVE NOTICED. OR THE OPPOSITE, BEING SO FIGETY OR RESTLESS THAT YOU HAVE BEEN MOVING AROUND A LOT MORE THAN USUAL: 0
1. LITTLE INTEREST OR PLEASURE IN DOING THINGS: 0
10. IF YOU CHECKED OFF ANY PROBLEMS, HOW DIFFICULT HAVE THESE PROBLEMS MADE IT FOR YOU TO DO YOUR WORK, TAKE CARE OF THINGS AT HOME, OR GET ALONG WITH OTHER PEOPLE: 0
7. TROUBLE CONCENTRATING ON THINGS, SUCH AS READING THE NEWSPAPER OR WATCHING TELEVISION: 0
2. FEELING DOWN, DEPRESSED OR HOPELESS: 0
SUM OF ALL RESPONSES TO PHQ9 QUESTIONS 1 & 2: 0
SUM OF ALL RESPONSES TO PHQ QUESTIONS 1-9: 0
9. THOUGHTS THAT YOU WOULD BE BETTER OFF DEAD, OR OF HURTING YOURSELF: 0
5. POOR APPETITE OR OVEREATING: 0
3. TROUBLE FALLING OR STAYING ASLEEP: 0

## 2023-09-18 NOTE — PROGRESS NOTES
difficulty breathing or respiratory distress        [] Abnormal-      Musculoskeletal:   [x] Normal gait with no signs of ataxia         [] Normal range of motion of neck        [] Abnormal-       Neurological:        [x] No Facial Asymmetry (Cranial nerve 7 motor function) (limited exam to video visit)          [] No gaze palsy        [] Abnormal-         Skin:        [x] No significant exanthematous lesions or discoloration noted on facial skin         [] Abnormal-            Psychiatric:       [x] Normal Affect [] No Hallucinations        [] Abnormal-     Other pertinent observable physical exam findings-     ASSESSMENT/PLAN:   Diagnosis Orders   1. Obesity (BMI 30-39. 9)  Semaglutide,0.25 or 0.5MG/DOS, 2 MG/3ML SOPN    [facility] - Nora Gross CNP, Weight Loss, [place]      2. Abscess          Return in about 1 month (around 10/18/2023), or if symptoms worsen or fail to improve, for weight management. [facility], was evaluated through a synchronous (real-time) audio-video encounter. The patient (or guardian if applicable) is aware that this is a billable service, which includes applicable co-pays. This Virtual Visit was conducted with patient's (and/or legal guardian's) consent. Patient identification was verified, and a caregiver was present when appropriate. The patient was located at Home: [address] [address]  Provider was located at [facility] ([address] [place] [address]): [phone], [address] [facility]    --Sally Lozoya MD on 9/18/2023 at 9:00 AM    An Edison Magdaleno was used to authenticate this note.

## 2023-09-22 ENCOUNTER — TELEPHONE (OUTPATIENT)
Facility: CLINIC | Age: 38
End: 2023-09-22

## 2023-09-22 NOTE — TELEPHONE ENCOUNTER
Pt calling because she needs a prior authorization for Semaglutide,0.25 or 0.5MG/DOS, 2 MG/3ML SOPN  Please advise.  Thank you!!!

## 2023-09-26 ENCOUNTER — TELEPHONE (OUTPATIENT)
Facility: CLINIC | Age: 38
End: 2023-09-26

## 2023-09-26 NOTE — TELEPHONE ENCOUNTER
Pt calling because the insurance denied her medication for ozempic. Pt is calling because her insurance stated her medication wasn't approve because something was missing from the paperwork. Pt stated they sent a prior authorization over for an alternative medication for ozempic. Pt is requesting to speak to a nurse because she needs understanding. Ms. Good Marx can be reached at [phone]. Please advise.  Thank you!!!

## 2023-11-29 ENCOUNTER — OFFICE VISIT (OUTPATIENT)
Age: 38
End: 2023-11-29
Payer: COMMERCIAL

## 2023-11-29 VITALS
HEIGHT: 67 IN | SYSTOLIC BLOOD PRESSURE: 121 MMHG | BODY MASS INDEX: 32.96 KG/M2 | RESPIRATION RATE: 17 BRPM | DIASTOLIC BLOOD PRESSURE: 72 MMHG | WEIGHT: 210 LBS | HEART RATE: 76 BPM | OXYGEN SATURATION: 99 % | TEMPERATURE: 97.6 F

## 2023-11-29 DIAGNOSIS — Z71.3 ENCOUNTER FOR DIETARY CONSULTATION: ICD-10-CM

## 2023-11-29 DIAGNOSIS — E66.09 CLASS 1 OBESITY DUE TO EXCESS CALORIES WITH SERIOUS COMORBIDITY AND BODY MASS INDEX (BMI) OF 33.0 TO 33.9 IN ADULT: Primary | ICD-10-CM

## 2023-11-29 DIAGNOSIS — Z71.3 WEIGHT LOSS COUNSELING, ENCOUNTER FOR: ICD-10-CM

## 2023-11-29 PROCEDURE — 99203 OFFICE O/P NEW LOW 30 MIN: CPT | Performed by: NURSE PRACTITIONER

## 2023-11-29 ASSESSMENT — ENCOUNTER SYMPTOMS
GASTROINTESTINAL NEGATIVE: 1
ALLERGIC/IMMUNOLOGIC NEGATIVE: 1
EYES NEGATIVE: 1
RESPIRATORY NEGATIVE: 1

## 2023-11-29 NOTE — PROGRESS NOTES
Weight Loss Progress Note: Initial Physician Visit      Dee Joyce is a 45 y.o. female with BMI 33.8 who is here for her initial evaluation for the medical weight loss grocery program. Patient has not previously done a [facility] program, but has been on phentermine in the past per her PCP but did not like it as she experienced heart palpitations. She wants to lose weight to just be healthier overall. CC: Obesity    Weight History  Current weight 210lb   Goal weight 180-185lbs  Highest weight 210lbs   (See weight gain time line scanned into media section of chart)    Food intolerances (gas, bloating, diarrhea, vomiting)? None. Weight loss History  How many weight loss attempts have you had? Multiple attempts but patient reports no sustainable weight loss. Which program were you most successful doing? Significant Medical History    Have you ever taken appetite suppressants? Yes. If yes: Rx or OTC? Phentermine. If yes; Any negative side effects? Ever diagnosed with sleep apnea or put on CPAP? No.    Ever had bariatric surgery?: No.    Pregnant or planning on becoming pregnant w/in 6 months: No.      Significant Psychosocial History   Any history of drug abuse or dependence: No.  Current Major Lifestyle Changes: No.  Any potential unsupportive people: No.  Why are you starting a weight loss program now? Patient desires to lose weight to be a healthier version of herself. Are you ready? Yes. History of binge eating disorder or anorexia: No.  If yes, are you currently being treated? Social History  Social History     Tobacco Use    Smoking status: Former    Smokeless tobacco: Never   Substance Use Topics    Alcohol use: Yes     How many times a week do you eat out? Occasionally. Do you drink any EtOH? Yes. If so, how much? Socially - on weekends. Exercise  How many days a week do you currently exercise?  0 days. Have you ever been told by a physician not to exercise?

## 2024-01-18 ENCOUNTER — TELEPHONE (OUTPATIENT)
Age: 39
End: 2024-01-18

## 2024-01-18 ENCOUNTER — OFFICE VISIT (OUTPATIENT)
Age: 39
End: 2024-01-18
Payer: COMMERCIAL

## 2024-01-18 VITALS
OXYGEN SATURATION: 99 % | HEIGHT: 67 IN | RESPIRATION RATE: 17 BRPM | SYSTOLIC BLOOD PRESSURE: 130 MMHG | TEMPERATURE: 97.7 F | WEIGHT: 215 LBS | DIASTOLIC BLOOD PRESSURE: 61 MMHG | BODY MASS INDEX: 33.74 KG/M2

## 2024-01-18 DIAGNOSIS — E66.09 CLASS 1 OBESITY DUE TO EXCESS CALORIES WITH SERIOUS COMORBIDITY AND BODY MASS INDEX (BMI) OF 33.0 TO 33.9 IN ADULT: Primary | ICD-10-CM

## 2024-01-18 DIAGNOSIS — Z71.3 ENCOUNTER FOR DIETARY COUNSELING AND SURVEILLANCE: ICD-10-CM

## 2024-01-18 DIAGNOSIS — Z71.3 ENCOUNTER FOR WEIGHT LOSS COUNSELING: ICD-10-CM

## 2024-01-18 PROCEDURE — 99213 OFFICE O/P EST LOW 20 MIN: CPT | Performed by: NURSE PRACTITIONER

## 2024-01-18 RX ORDER — TOPIRAMATE 25 MG/1
25 TABLET ORAL DAILY
Qty: 60 TABLET | Refills: 1 | Status: SHIPPED | OUTPATIENT
Start: 2024-01-18

## 2024-01-18 NOTE — PROGRESS NOTES
[facility] Progress Note:   F/up Provider Visit    CC: Weight Management    Carie Ko is a 38 y.o. female who is here for her  f/up medical provider visit for the grocery LCD program.  Previous patient of BRANDY Sandhu and SHARON was asked to assume her care.    Starting weight: 210lb  Weight last visit: 210lb  Weight today: 215lb    Arm: 11in  Waist: 37in  Thigh: 20in  Up 5 pounds but down 2.5 inches since last visit.     Struggled with LCD over the holidays.  She is interested in anti-obesity medication but experienced side effects such as nausea and palpitations with phentermine and does not want to do any other form of stimulant appetite suppressant. Interested in Zepbound    Did you have any problems adhering to the program since last visit? yes  If yes, please explain: see above    Since your last visit, have you experienced any complications? no    Have you received any other medical care since last visit? no  If yes, where and for what? N/a    Have you had any change in your medications since your last visit? no  If yes what? N/a    BP Readings from Last 3 Encounters:   01/18/24 130/61   11/29/23 121/72   08/22/23 (!) 150/95        Eating Habits Over Last Week:  Did you take in 64 oz of non-caloric fluids? About 40oz.    Did you consume your prescribed meal replacement regimen each day? N/a       Physical Activity Over the Past Week:    Aerobic exercise: Walking inconsistently.  Resistance exercise: 0 workouts / week      Weight History      1/18/2024     1:04 PM 11/29/2023     2:32 PM 8/22/2023    11:22 AM 8/8/2022     3:52 PM 12/14/2021     6:30 AM   Weight Metrics   Weight 215 lb 210 lb 207 lb 202 lb 195 lb   BMI (Calculated) 33.7 kg/m2 33 kg/m2 33.5 kg/m2 31.7 kg/m2 30.6 kg/m2         Starting wt: 210  Goal wt: 180-185  EKG due: 160  Ideal body weight: 61.6 kg (135 lb 12.9 oz)  Adjusted ideal body weight: 76 kg (167 lb 7.7 oz)  Body mass index is 33.67 kg/m².      History    Past

## 2024-01-18 NOTE — TELEPHONE ENCOUNTER
Patient's insurance company called stating they needed the CPT or J code for the medication being prescribed by provider so that they can let member know if it is covered by her insurance. They stated our office could call patient back with information and did not provide call back number.

## 2024-01-19 NOTE — TELEPHONE ENCOUNTER
Patient called back asking for any updates. Patient was advised that BRANDY Chacon is out of the office today and has not completed the patient's note from just being seen yesterday, 1/18/2024. Patient was advised Ines NAVA will be back in the office on Monday.

## 2024-01-25 ENCOUNTER — PATIENT MESSAGE (OUTPATIENT)
Age: 39
End: 2024-01-25

## 2024-01-25 ASSESSMENT — ENCOUNTER SYMPTOMS
NAUSEA: 0
ABDOMINAL PAIN: 0
CONSTIPATION: 0
VOMITING: 0
DIARRHEA: 0
SHORTNESS OF BREATH: 0

## 2024-02-01 ENCOUNTER — OFFICE VISIT (OUTPATIENT)
Facility: CLINIC | Age: 39
End: 2024-02-01
Payer: COMMERCIAL

## 2024-02-01 VITALS
HEIGHT: 67 IN | WEIGHT: 215 LBS | OXYGEN SATURATION: 99 % | RESPIRATION RATE: 20 BRPM | SYSTOLIC BLOOD PRESSURE: 129 MMHG | DIASTOLIC BLOOD PRESSURE: 80 MMHG | HEART RATE: 76 BPM | TEMPERATURE: 98.1 F | BODY MASS INDEX: 33.74 KG/M2

## 2024-02-01 DIAGNOSIS — E07.9 THYROID DISORDER: ICD-10-CM

## 2024-02-01 DIAGNOSIS — R60.0 PEDAL EDEMA: Primary | ICD-10-CM

## 2024-02-01 DIAGNOSIS — Z13.220 SCREENING, LIPID: ICD-10-CM

## 2024-02-01 DIAGNOSIS — R73.9 HYPERGLYCEMIA, UNSPECIFIED: ICD-10-CM

## 2024-02-01 DIAGNOSIS — Z11.59 NEED FOR HEPATITIS C SCREENING TEST: ICD-10-CM

## 2024-02-01 DIAGNOSIS — R14.0 ABDOMINAL DISTENTION: ICD-10-CM

## 2024-02-01 DIAGNOSIS — E66.9 OBESITY (BMI 30-39.9): ICD-10-CM

## 2024-02-01 PROBLEM — S39.012A LOW BACK STRAIN: Status: ACTIVE | Noted: 2019-05-26

## 2024-02-01 PROBLEM — M17.11 UNILATERAL PRIMARY OSTEOARTHRITIS, RIGHT KNEE: Status: ACTIVE | Noted: 2024-02-01

## 2024-02-01 PROBLEM — Z20.822 COVID-19 RULED OUT BY LABORATORY TESTING: Status: ACTIVE | Noted: 2024-02-01

## 2024-02-01 LAB — HBA1C MFR BLD: 5.4 %

## 2024-02-01 PROCEDURE — 99214 OFFICE O/P EST MOD 30 MIN: CPT | Performed by: FAMILY MEDICINE

## 2024-02-01 PROCEDURE — 83036 HEMOGLOBIN GLYCOSYLATED A1C: CPT | Performed by: FAMILY MEDICINE

## 2024-02-01 SDOH — ECONOMIC STABILITY: FOOD INSECURITY: WITHIN THE PAST 12 MONTHS, YOU WORRIED THAT YOUR FOOD WOULD RUN OUT BEFORE YOU GOT MONEY TO BUY MORE.: NEVER TRUE

## 2024-02-01 SDOH — ECONOMIC STABILITY: FOOD INSECURITY: WITHIN THE PAST 12 MONTHS, THE FOOD YOU BOUGHT JUST DIDN'T LAST AND YOU DIDN'T HAVE MONEY TO GET MORE.: NEVER TRUE

## 2024-02-01 SDOH — ECONOMIC STABILITY: INCOME INSECURITY: HOW HARD IS IT FOR YOU TO PAY FOR THE VERY BASICS LIKE FOOD, HOUSING, MEDICAL CARE, AND HEATING?: NOT VERY HARD

## 2024-02-01 SDOH — ECONOMIC STABILITY: HOUSING INSECURITY
IN THE LAST 12 MONTHS, WAS THERE A TIME WHEN YOU DID NOT HAVE A STEADY PLACE TO SLEEP OR SLEPT IN A SHELTER (INCLUDING NOW)?: NO

## 2024-02-01 ASSESSMENT — PATIENT HEALTH QUESTIONNAIRE - PHQ9
SUM OF ALL RESPONSES TO PHQ QUESTIONS 1-9: 0
SUM OF ALL RESPONSES TO PHQ QUESTIONS 1-9: 0
1. LITTLE INTEREST OR PLEASURE IN DOING THINGS: 0
SUM OF ALL RESPONSES TO PHQ9 QUESTIONS 1 & 2: 0
SUM OF ALL RESPONSES TO PHQ QUESTIONS 1-9: 0
SUM OF ALL RESPONSES TO PHQ QUESTIONS 1-9: 0
2. FEELING DOWN, DEPRESSED OR HOPELESS: 0

## 2024-02-01 NOTE — PROGRESS NOTES
\"Have you been to the ER, urgent care clinic since your last visit?  Hospitalized since your last visit?\"    NO    “Have you seen or consulted any other health care providers outside of [facility] since your last visit?”    NO        “Have you had a pap smear?”    NO

## 2024-02-01 NOTE — PROGRESS NOTES
HPI  Carie Ko comes in for follow up care.  Pedal edema: Patient has pedal edema.  This comes on and off.  Improves with elevation of the lower extremities.  She denies pain lower extremities, redness or discoloration.  I will check labs.  Will check CBC, CMP and TSH.  I will follow-up with the results.  Hyperglycemia: Patient has HbA1c of 5.4.  This is stable.  She will intensify lifestyle and dietary modification.  Abdominal distention: Patient has noted abdominal distention.  Occasionally this gets worse when she is trying to sit up from a laying down position.  It is mainly in the suprapubic area.  Denies abdominal pain, nausea, vomiting, fever, chills or change in bowel habits.  She denies being constipated.  This is diastases recti.  I will order an abdominal ultrasound to evaluate for any other abnormality.  Will check labs.  Screen lipid: We will check lipid panel.  Thyroid disorder: I will check TSH.  Obesity: Patient has obesity with a BMI of 33.67.  She has been followed up in the weight loss clinic.  She is currently on Topamax.  She will intensify lifestyle and dietary modification.        Past Medical History  Past Medical History:   Diagnosis Date    Gastritis     Obesity        Surgical History  Past Surgical History:   Procedure Laterality Date    BREAST REDUCTION SURGERY      TUBAL LIGATION          Medications  Current Outpatient Medications   Medication Sig Dispense Refill    topiramate (TOPAMAX) 25 MG tablet Take 1 tablet by mouth daily Can increase to 2 tabs daily starting week two. 60 tablet 1     No current facility-administered medications for this visit.       Allergies  No Known Allergies    Family History  No family history on file.    Social History  Social History     Socioeconomic History    Marital status: Single     Spouse name: Not on file    Number of children: Not on file    Years of education: Not on file    Highest education level: Not on file   Occupational History

## 2024-02-12 DIAGNOSIS — E66.09 CLASS 1 OBESITY DUE TO EXCESS CALORIES WITH SERIOUS COMORBIDITY AND BODY MASS INDEX (BMI) OF 33.0 TO 33.9 IN ADULT: Primary | ICD-10-CM

## 2024-02-12 RX ORDER — SEMAGLUTIDE 0.25 MG/.5ML
0.25 INJECTION, SOLUTION SUBCUTANEOUS
Qty: 2 ML | Refills: 0 | Status: SHIPPED | OUTPATIENT
Start: 2024-02-12 | End: 2024-02-12

## 2024-02-12 RX ORDER — TIRZEPATIDE 2.5 MG/.5ML
2.5 INJECTION, SOLUTION SUBCUTANEOUS
Qty: 2 ML | Refills: 0 | Status: SHIPPED | OUTPATIENT
Start: 2024-02-12

## 2024-02-12 RX ORDER — TIRZEPATIDE 5 MG/.5ML
5 INJECTION, SOLUTION SUBCUTANEOUS
Qty: 2 ML | Refills: 0 | Status: SHIPPED | OUTPATIENT
Start: 2024-02-12

## 2024-02-12 RX ORDER — SEMAGLUTIDE 0.5 MG/.5ML
0.5 INJECTION, SOLUTION SUBCUTANEOUS
Qty: 2 ML | Refills: 0 | Status: SHIPPED | OUTPATIENT
Start: 2024-02-12 | End: 2024-02-12

## 2024-02-12 RX ORDER — SEMAGLUTIDE 1 MG/.5ML
1 INJECTION, SOLUTION SUBCUTANEOUS
Qty: 2 ML | Refills: 0 | Status: SHIPPED | OUTPATIENT
Start: 2024-02-12 | End: 2024-02-12

## 2024-02-12 NOTE — PROGRESS NOTES
Orlistat: contraindicated with high protein/fat and low carb diet  Phentermine (Adipex-P): Phentermine and Qsymia contraindicated due to side effects experienced with phentermine (nausea/palpitations)   Bupropion-naltrexone (Contrave): not indicated for specific eating behaviors   Saxenda/Wegovy: Not available due to national backorder

## 2024-02-21 DIAGNOSIS — E66.09 CLASS 1 OBESITY DUE TO EXCESS CALORIES WITH SERIOUS COMORBIDITY AND BODY MASS INDEX (BMI) OF 33.0 TO 33.9 IN ADULT: Primary | ICD-10-CM

## 2024-02-21 RX ORDER — SEMAGLUTIDE 1 MG/.5ML
1 INJECTION, SOLUTION SUBCUTANEOUS
Qty: 2 ML | Refills: 0 | Status: SHIPPED | OUTPATIENT
Start: 2024-02-21 | End: 2024-02-22 | Stop reason: SDUPTHER

## 2024-02-21 RX ORDER — SEMAGLUTIDE 0.25 MG/.5ML
0.25 INJECTION, SOLUTION SUBCUTANEOUS
Qty: 2 ML | Refills: 0 | Status: SHIPPED | OUTPATIENT
Start: 2024-02-21 | End: 2024-02-22 | Stop reason: SDUPTHER

## 2024-02-21 RX ORDER — SEMAGLUTIDE 0.5 MG/.5ML
0.5 INJECTION, SOLUTION SUBCUTANEOUS
Qty: 2 ML | Refills: 0 | Status: SHIPPED | OUTPATIENT
Start: 2024-02-21 | End: 2024-02-22 | Stop reason: SDUPTHER

## 2024-02-22 RX ORDER — SEMAGLUTIDE 1 MG/.5ML
1 INJECTION, SOLUTION SUBCUTANEOUS
Qty: 2 ML | Refills: 0 | Status: SHIPPED | OUTPATIENT
Start: 2024-02-22

## 2024-02-22 RX ORDER — SEMAGLUTIDE 0.5 MG/.5ML
0.5 INJECTION, SOLUTION SUBCUTANEOUS
Qty: 2 ML | Refills: 0 | Status: SHIPPED | OUTPATIENT
Start: 2024-02-22

## 2024-02-22 RX ORDER — SEMAGLUTIDE 0.25 MG/.5ML
0.25 INJECTION, SOLUTION SUBCUTANEOUS
Qty: 2 ML | Refills: 0 | Status: SHIPPED | OUTPATIENT
Start: 2024-02-22

## 2024-02-26 ENCOUNTER — OFFICE VISIT (OUTPATIENT)
Age: 39
End: 2024-02-26
Payer: COMMERCIAL

## 2024-02-26 VITALS
OXYGEN SATURATION: 99 % | TEMPERATURE: 97.7 F | DIASTOLIC BLOOD PRESSURE: 84 MMHG | SYSTOLIC BLOOD PRESSURE: 116 MMHG | WEIGHT: 215 LBS | HEIGHT: 67 IN | BODY MASS INDEX: 33.74 KG/M2 | RESPIRATION RATE: 18 BRPM

## 2024-02-26 DIAGNOSIS — Z71.3 ENCOUNTER FOR WEIGHT LOSS COUNSELING: ICD-10-CM

## 2024-02-26 DIAGNOSIS — E66.09 CLASS 1 OBESITY DUE TO EXCESS CALORIES WITH SERIOUS COMORBIDITY AND BODY MASS INDEX (BMI) OF 33.0 TO 33.9 IN ADULT: Primary | ICD-10-CM

## 2024-02-26 DIAGNOSIS — Z79.899 FOLLOW-UP ENCOUNTER INVOLVING MEDICATION: ICD-10-CM

## 2024-02-26 DIAGNOSIS — Z71.3 ENCOUNTER FOR DIETARY COUNSELING AND SURVEILLANCE: ICD-10-CM

## 2024-02-26 PROCEDURE — 99213 OFFICE O/P EST LOW 20 MIN: CPT | Performed by: NURSE PRACTITIONER

## 2024-02-26 RX ORDER — SEMAGLUTIDE 0.5 MG/.5ML
0.5 INJECTION, SOLUTION SUBCUTANEOUS
Qty: 2 ML | Refills: 0 | Status: SHIPPED | OUTPATIENT
Start: 2024-02-26 | End: 2024-02-29

## 2024-02-26 NOTE — PROGRESS NOTES
[facility] Progress Note:   F/up Provider Visit    CC: Weight Management    Carie Ko is a 38 y.o. female who is here for her f/up medical provider visit for the Medication program.         2/26/2024    11:14 AM   Non-Surgical Weight Loss Tracker   Consult Date 11/29/2023   Initial Height 5' 7\"   Initial Weight 210 lb   Ideal Body Weight 137 lb   Initial BMI 32.89   Initial Body Fat % 39.47   EBW 73 lb   Date 2/26/2024   Height 5' 7\"   Weight 215 lb   BMI 33.67   Weight Change 215 lb   Total Weight Change 0 lb   % EBWL <UNK>%   Subsequent Body Fat % 40.4   Body Fat % Change 40.4        Arm: 12in  Waist: 37in  Thigh: 22.5in  Body Fat Percentage: 38.5%    Body composition scanned to media    Down 0 lbs since last visit.Up 5 lbs since starting program on 11/29/2023.    Goal wt: 180-185  EKG due: 160  Ideal body weight: 61.6 kg (135 lb 12.9 oz)  Adjusted ideal body weight: 76 kg (167 lb 7.7 oz)  Body mass index is 33.67 kg/m².    Got up to two tablets of 25 mg topiramate daily, reports that medication did suppress appetite slightly but stopped about two weeks ago.   Was able to obtain 0.25 mg dose of Wegovy but has not started yet.   Since last visit she has eliminated sugary drinks, but does admit to snacking on chips at work.     Did you have any problems adhering to the program since last visit? No  If yes, please explain:     Since your last visit, have you experienced any complications? No    Have you received any other medical care since last visit? No  If yes, where and for what?     Have you had any change in your medications since your last visit? No If yes what?     Are you taking an anti-obesity medication? Yes If yes what and are you experiencing any side effects?      Would you still like to continue your current medication and dosage? Yes    BP Readings from Last 3 Encounters:   02/29/24 121/68   02/26/24 116/84   02/01/24 129/80        Eating Habits Over Last

## 2024-02-29 ENCOUNTER — OFFICE VISIT (OUTPATIENT)
Facility: CLINIC | Age: 39
End: 2024-02-29
Payer: COMMERCIAL

## 2024-02-29 VITALS
OXYGEN SATURATION: 100 % | RESPIRATION RATE: 20 BRPM | HEIGHT: 67 IN | WEIGHT: 214 LBS | DIASTOLIC BLOOD PRESSURE: 68 MMHG | TEMPERATURE: 98.4 F | HEART RATE: 71 BPM | BODY MASS INDEX: 33.59 KG/M2 | SYSTOLIC BLOOD PRESSURE: 121 MMHG

## 2024-02-29 DIAGNOSIS — M54.50 CHRONIC MIDLINE LOW BACK PAIN, UNSPECIFIED WHETHER SCIATICA PRESENT: Primary | ICD-10-CM

## 2024-02-29 DIAGNOSIS — E66.9 OBESITY (BMI 30-39.9): ICD-10-CM

## 2024-02-29 DIAGNOSIS — G89.29 CHRONIC MIDLINE LOW BACK PAIN, UNSPECIFIED WHETHER SCIATICA PRESENT: Primary | ICD-10-CM

## 2024-02-29 PROCEDURE — 99213 OFFICE O/P EST LOW 20 MIN: CPT | Performed by: FAMILY MEDICINE

## 2024-02-29 ASSESSMENT — PATIENT HEALTH QUESTIONNAIRE - PHQ9
SUM OF ALL RESPONSES TO PHQ QUESTIONS 1-9: 0
SUM OF ALL RESPONSES TO PHQ QUESTIONS 1-9: 0
SUM OF ALL RESPONSES TO PHQ9 QUESTIONS 1 & 2: 0
1. LITTLE INTEREST OR PLEASURE IN DOING THINGS: 0
SUM OF ALL RESPONSES TO PHQ QUESTIONS 1-9: 0
2. FEELING DOWN, DEPRESSED OR HOPELESS: 0
SUM OF ALL RESPONSES TO PHQ QUESTIONS 1-9: 0

## 2024-02-29 NOTE — PROGRESS NOTES
HPI  Carie Ko comes in for follow-up care and to have FMLA paperwork filled out.  Low back pain: Patient has chronic low back pain.  She has been seen by the spine specialist for this.  She has had physical therapy done.  She has occasional flareups of back pain.  She is unable to walk during these flareups.  The flareups trigger pain to an extent that she cannot sit or stand while at the workplace.  She has to lay down and is usually at home.  She also has to take medication for pain.  This limits her ability to go to work.  She would like FMLA paperwork filled out indicating this.  This was done.  She will continue with supportive care.  She will continue to follow-up with her specialist as needed.  Obesity: Patient has a BMI of 33.52.  She will intensify lifestyle and dietary modification.  Health maintenance: Patient states that she is due to get a Pap smear done.  Will request the records.      Past Medical History  Past Medical History:   Diagnosis Date    Gastritis     Obesity        Surgical History  Past Surgical History:   Procedure Laterality Date    BREAST REDUCTION SURGERY      TUBAL LIGATION          Medications  Current Outpatient Medications   Medication Sig Dispense Refill    Semaglutide-Weight Management (WEGOVY) 0.25 MG/0.5ML SOAJ SC injection Inject 0.25 mg into the skin every 7 days Weeks 1-4 2 mL 0    Semaglutide-Weight Management (WEGOVY) 0.5 MG/0.5ML SOAJ SC injection Inject 0.5 mg into the skin every 7 days Weeks 5-8 (Patient not taking: Reported on 2/29/2024) 2 mL 0    Semaglutide-Weight Management (WEGOVY) 1 MG/0.5ML SOAJ SC injection Inject 1 mg into the skin every 7 days Weeks 9-12 (Patient not taking: Reported on 2/26/2024) 2 mL 0    topiramate (TOPAMAX) 25 MG tablet Take 1 tablet by mouth daily Can increase to 2 tabs daily starting week two. (Patient not taking: Reported on 2/29/2024) 60 tablet 1     No current facility-administered medications for this visit.

## 2024-02-29 NOTE — PROGRESS NOTES
\"Have you been to the ER, urgent care clinic since your last visit?  Hospitalized since your last visit?\"    NO    “Have you seen or consulted any other health care providers outside of [facility] since your last visit?”    NO        “Have you had a pap smear?”    NO       good minus

## 2024-03-02 ASSESSMENT — ENCOUNTER SYMPTOMS
NAUSEA: 0
COUGH: 0
VOMITING: 0
ABDOMINAL PAIN: 0
DIARRHEA: 0
SHORTNESS OF BREATH: 0
CONSTIPATION: 0

## 2024-04-17 ENCOUNTER — OFFICE VISIT (OUTPATIENT)
Facility: CLINIC | Age: 39
End: 2024-04-17
Payer: COMMERCIAL

## 2024-04-17 VITALS
TEMPERATURE: 97.8 F | SYSTOLIC BLOOD PRESSURE: 131 MMHG | RESPIRATION RATE: 20 BRPM | BODY MASS INDEX: 34.07 KG/M2 | WEIGHT: 212 LBS | HEIGHT: 66 IN | HEART RATE: 76 BPM | DIASTOLIC BLOOD PRESSURE: 76 MMHG

## 2024-04-17 DIAGNOSIS — M54.41 CHRONIC MIDLINE LOW BACK PAIN WITH BILATERAL SCIATICA: Primary | ICD-10-CM

## 2024-04-17 DIAGNOSIS — E66.9 OBESITY (BMI 30-39.9): ICD-10-CM

## 2024-04-17 DIAGNOSIS — G89.29 CHRONIC MIDLINE LOW BACK PAIN WITH BILATERAL SCIATICA: Primary | ICD-10-CM

## 2024-04-17 DIAGNOSIS — M54.42 CHRONIC MIDLINE LOW BACK PAIN WITH BILATERAL SCIATICA: Primary | ICD-10-CM

## 2024-04-17 PROCEDURE — 99213 OFFICE O/P EST LOW 20 MIN: CPT | Performed by: FAMILY MEDICINE

## 2024-04-17 RX ORDER — NAPROXEN 500 MG/1
500 TABLET ORAL 2 TIMES DAILY
COMMUNITY
Start: 2024-04-13

## 2024-04-17 RX ORDER — METHOCARBAMOL 750 MG/1
750 TABLET, FILM COATED ORAL EVERY 4 HOURS PRN
COMMUNITY
Start: 2024-04-13

## 2024-04-17 RX ORDER — LIDOCAINE 50 MG/G
PATCH TOPICAL
COMMUNITY
Start: 2024-04-13

## 2024-04-17 RX ORDER — SEMAGLUTIDE 0.25 MG/.5ML
INJECTION, SOLUTION SUBCUTANEOUS
COMMUNITY
Start: 2024-03-27
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
## 2024-04-17 NOTE — PROGRESS NOTES
\"Have you been to the ER, urgent care clinic since your last visit?  Hospitalized since your last visit?\"    NO    “Have you seen or consulted any other health care providers outside of [facility] since your last visit?”    NO     “Have you had a pap smear?”    NO    Date of last Cervical Cancer screen (HPV or PAP): 4/20/2018             Click Here for Release of Records Request

## 2024-06-05 ENCOUNTER — TELEPHONE (OUTPATIENT)
Age: 39
End: 2024-06-05

## 2024-06-19 ENCOUNTER — OFFICE VISIT (OUTPATIENT)
Age: 39
End: 2024-06-19
Payer: COMMERCIAL

## 2024-06-19 VITALS
TEMPERATURE: 98 F | DIASTOLIC BLOOD PRESSURE: 80 MMHG | HEART RATE: 80 BPM | SYSTOLIC BLOOD PRESSURE: 118 MMHG | HEIGHT: 66 IN | WEIGHT: 201.3 LBS | BODY MASS INDEX: 32.35 KG/M2 | RESPIRATION RATE: 18 BRPM

## 2024-06-19 DIAGNOSIS — Z79.899 FOLLOW-UP ENCOUNTER INVOLVING MEDICATION: ICD-10-CM

## 2024-06-19 DIAGNOSIS — E66.09 CLASS 1 OBESITY DUE TO EXCESS CALORIES WITH SERIOUS COMORBIDITY AND BODY MASS INDEX (BMI) OF 33.0 TO 33.9 IN ADULT: Primary | ICD-10-CM

## 2024-06-19 DIAGNOSIS — Z71.3 ENCOUNTER FOR WEIGHT LOSS COUNSELING: ICD-10-CM

## 2024-06-19 PROCEDURE — 99213 OFFICE O/P EST LOW 20 MIN: CPT | Performed by: NURSE PRACTITIONER

## 2024-06-19 RX ORDER — SEMAGLUTIDE 2.4 MG/.75ML
2.4 INJECTION, SOLUTION SUBCUTANEOUS
Qty: 3 ML | Refills: 1 | Status: SHIPPED | OUTPATIENT
Start: 2024-06-19

## 2024-06-19 RX ORDER — SEMAGLUTIDE 1.7 MG/.75ML
1.7 INJECTION, SOLUTION SUBCUTANEOUS
Qty: 3 ML | Refills: 0 | Status: SHIPPED | OUTPATIENT
Start: 2024-06-19

## 2024-06-19 RX ORDER — SEMAGLUTIDE 1 MG/.5ML
1 INJECTION, SOLUTION SUBCUTANEOUS
COMMUNITY

## 2024-06-19 NOTE — PROGRESS NOTES
Chief Complaint   Patient presents with    Weight Management    1. Have you been to the ER, urgent care clinic since your last visit?  Hospitalized since your last visit?No    2. Have you seen or consulted any other health care providers outside of the [facility] since your last visit?  Include any pap smears or colon screening. No   [facility] Nurse Note:       CC: Weight Management    Carie Ko is a 39 y.o. female who is here for her f/up medical provider visit for the Medication program.       Did you have any problems adhering to the program since last visit? No  If yes, please explain:     Since your last visit, have you experienced any complications? No    Have you received any other medical care since last visit? No  If yes, where and for what?     Have you had any change in your medications since your last visit? No If yes what?     Are you taking an anti-obesity medication? Yes If yes what and are you experiencing any side effects?  Some fatigue and nausea day after taking medication    Would you still like to continue your current medication and dosage? Yes    BP Readings from Last 3 Encounters:   04/17/24 131/76   02/29/24 121/68   02/26/24 116/84        Eating Habits Over Last Week:    How many oz of sugar-free fluids are you consuming? 64    Did you consume your prescribed meal replacement regimen each day? Yes    Physical Activity Routine:    Aerobic exercise: 0    Resistance exercise: 0

## 2024-06-20 ASSESSMENT — ENCOUNTER SYMPTOMS
VOMITING: 0
COUGH: 0
ABDOMINAL PAIN: 0
NAUSEA: 0
SHORTNESS OF BREATH: 0
CONSTIPATION: 0
DIARRHEA: 0

## 2024-06-20 NOTE — PROGRESS NOTES
[facility] Progress Note:   F/up Provider Visit    CC: Weight Management    History of Present Illness  The patient is a 39-year-old female presenting for weight management. She was last seen in 02/2024 and was planning to start Wegovy.    The patient is currently on a regimen of Wegovy 1 mg, administered at night. She commenced the 1 mg dose last week and is scheduled to receive her second dose this week. She reported experiencing nausea, which has since resolved but is still experiencing fatigue. She expressed interest in exploring over-the-counter B12 or vitamin D supplementation for enhanced energy levels. She has Zofran on hand from a previous surgery if she has been experiencing nausea.  There have been no alterations in her medication regimen. She has discontinued the use of Topamax. She reported that Wegovy has been beneficial in managing her snacking.        2/26/2024    11:14 AM 6/20/2024     3:02 PM   Non-Surgical Weight Loss Tracker   Consult Date 11/29/2023    Initial Height 5' 7\"    Initial Weight 210 lb    Ideal Body Weight 137 lb    Initial BMI 32.89    Initial Body Fat % 39.47    EBW 73 lb    Date 2/26/2024 6/19/2024   Height 5' 7\" 5' 6\"   Weight 215 lb 201 lb   BMI 33.67 32.44   Weight Change 215 lb -9 lb   Total Weight Change 0 lb -9 lb   % EBWL <UNK>% 12%   Subsequent Body Fat % 40.4 38.93   Body Fat % Change 40.4 -1.47   General Comments  Wegovy 1 mg        Arm: 11.5  Waist: 37.5  Thigh: 22  Body Fat Percentage: 37.4    Body composition scanned to media    Down 14 lbs since last visit. Down 9 lbs since starting program on 11/29/2023 .    Goal wt: 180-185  EKG due: 160  Ideal body weight: 59.3 kg (130 lb 11.7 oz)  Adjusted ideal body weight: 72.1 kg (158 lb 15.4 oz)  Body mass index is 32.49 kg/m².    History    Past Medical History:   Diagnosis Date    Gastritis     Obesity        Past Surgical History:   Procedure Laterality Date    BREAST REDUCTION SURGERY      TUBAL

## 2024-07-09 ENCOUNTER — PATIENT MESSAGE (OUTPATIENT)
Age: 39
End: 2024-07-09

## 2024-07-09 RX ORDER — SEMAGLUTIDE 0.5 MG/.5ML
0.5 INJECTION, SOLUTION SUBCUTANEOUS
Qty: 2 ML | Refills: 1 | Status: SHIPPED | OUTPATIENT
Start: 2024-07-09 | End: 2024-07-11 | Stop reason: SDUPTHER

## 2024-07-09 NOTE — TELEPHONE ENCOUNTER
From: Carie Ko  To: Ines Davis  Sent: 7/9/2024  9:06 AM EDT  Subject: medication    I need to lower my dosage for the wegovy

## 2024-07-09 NOTE — TELEPHONE ENCOUNTER
The patient states that she is fatigued and nausea on the 1 mg.  The patient states she would like to go back down to the 0.5 mg.

## 2024-07-10 RX ORDER — SEMAGLUTIDE 0.25 MG/.5ML
INJECTION, SOLUTION SUBCUTANEOUS
OUTPATIENT
Start: 2024-07-10

## 2024-07-11 RX ORDER — SEMAGLUTIDE 0.5 MG/.5ML
0.5 INJECTION, SOLUTION SUBCUTANEOUS
Qty: 2 ML | Refills: 1 | Status: SHIPPED | OUTPATIENT
Start: 2024-07-11

## 2024-07-18 ENCOUNTER — TELEMEDICINE (OUTPATIENT)
Facility: CLINIC | Age: 39
End: 2024-07-18
Payer: COMMERCIAL

## 2024-07-18 DIAGNOSIS — J32.4 PANSINUSITIS, UNSPECIFIED CHRONICITY: Primary | ICD-10-CM

## 2024-07-18 PROCEDURE — 99213 OFFICE O/P EST LOW 20 MIN: CPT | Performed by: FAMILY MEDICINE

## 2024-07-18 RX ORDER — LORATADINE 10 MG/1
10 TABLET ORAL DAILY
Qty: 90 TABLET | Refills: 0 | Status: SHIPPED | OUTPATIENT
Start: 2024-07-18

## 2024-07-18 RX ORDER — AMOXICILLIN AND CLAVULANATE POTASSIUM 875; 125 MG/1; MG/1
1 TABLET, FILM COATED ORAL 2 TIMES DAILY
Qty: 14 TABLET | Refills: 0 | Status: SHIPPED | OUTPATIENT
Start: 2024-07-18 | End: 2024-07-25

## 2024-07-18 RX ORDER — FLUTICASONE PROPIONATE 50 MCG
2 SPRAY, SUSPENSION (ML) NASAL DAILY
Qty: 16 G | Refills: 0 | Status: SHIPPED | OUTPATIENT
Start: 2024-07-18

## 2024-07-18 NOTE — PROGRESS NOTES
1. \"Have you been to the ER, urgent care clinic since your last visit?  Hospitalized since your last visit?\"No    2. \"Have you seen or consulted any other health care providers outside of the [facility] since your last visit?\" No    3. For patients aged 45-75: Has the patient had a colonoscopy / FIT/ Cologuard? Not applicable      If the patient is female:    4. For patients aged 40-74: Has the patient had a mammogram within the past 2 years? Not applicable      5. For patients aged 21-65: Has the patient had a pap smear? No  scheduled upcoming  
occasional    Drug use: No        PHYSICAL EXAMINATION:  Constitutional: [x] Appears well-developed and well-nourished [] No apparent distress      [] Abnormal-   Mental status  [x] Alert and awake  [] Oriented to person/place/time []Able to follow commands      Eyes:  EOM    [x]  Normal  [] Abnormal-  Sclera  []  Normal  [] Abnormal -         Discharge []  None visible  [] Abnormal -    HENT:   [x] Normocephalic, atraumatic.  [] Abnormal   [] Mouth/Throat: Mucous membranes are moist.     External Ears [x] Normal  [] Abnormal-     Neck: [x] No visualized mass     Pulmonary/Chest: [x] Respiratory effort normal.  [] No visualized signs of difficulty breathing or respiratory distress        [] Abnormal-      Musculoskeletal:   [] Normal gait with no signs of ataxia         [x] Normal range of motion of neck        [] Abnormal-       Neurological:        [x] No Facial Asymmetry (Cranial nerve 7 motor function) (limited exam to video visit)          [] No gaze palsy        [] Abnormal-         Skin:        [x] No significant exanthematous lesions or discoloration noted on facial skin         [] Abnormal-            Psychiatric:       [x] Normal Affect [] No Hallucinations        [] Abnormal-     Other pertinent observable physical exam findings-     ASSESSMENT/PLAN:   Diagnosis Orders   1. Pansinusitis, unspecified chronicity  amoxicillin-clavulanate (AUGMENTIN) 875-125 MG per tablet    fluticasone (FLONASE) 50 MCG/ACT nasal spray    loratadine (CLARITIN) 10 MG tablet        No follow-ups on file.    Carie Ko, was evaluated through a synchronous (real-time) audio-video encounter. The patient (or guardian if applicable) is aware that this is a billable service, which includes applicable co-pays. This Virtual Visit was conducted with patient's (and/or legal guardian's) consent. Patient identification was verified, and a caregiver was present when appropriate.   The patient was located at Home:  [phone]

## 2024-08-01 DIAGNOSIS — J32.4 PANSINUSITIS, UNSPECIFIED CHRONICITY: ICD-10-CM

## 2024-08-01 RX ORDER — FLUTICASONE PROPIONATE 50 MCG
2 SPRAY, SUSPENSION (ML) NASAL DAILY
Refills: 1 | OUTPATIENT
Start: 2024-08-01

## 2024-08-08 ENCOUNTER — TELEPHONE (OUTPATIENT)
Age: 39
End: 2024-08-08

## 2024-08-08 NOTE — TELEPHONE ENCOUNTER
Called patient to make her aware that her PA for Wegovy is expiring on 08/19/2024 - I submitted the renewal and was approved from 07/09/2024 to 08/08/2025. Patient understood.

## 2024-08-23 ENCOUNTER — TELEPHONE (OUTPATIENT)
Age: 39
End: 2024-08-23

## 2024-09-05 DIAGNOSIS — E66.09 CLASS 1 OBESITY DUE TO EXCESS CALORIES WITH SERIOUS COMORBIDITY AND BODY MASS INDEX (BMI) OF 33.0 TO 33.9 IN ADULT: ICD-10-CM

## 2024-09-11 RX ORDER — SEMAGLUTIDE 1.7 MG/.75ML
1.7 INJECTION, SOLUTION SUBCUTANEOUS
OUTPATIENT
Start: 2024-09-11

## 2024-09-11 RX ORDER — SEMAGLUTIDE 0.25 MG/.5ML
INJECTION, SOLUTION SUBCUTANEOUS
OUTPATIENT
Start: 2024-09-11

## 2024-09-18 LAB
ALBUMIN SERPL-MCNC: 4.4 G/DL (ref 3.9–4.9)
ALP SERPL-CCNC: 72 IU/L (ref 44–121)
ALT SERPL-CCNC: 18 IU/L (ref 0–32)
AST SERPL-CCNC: 18 IU/L (ref 0–40)
BASOPHILS # BLD AUTO: 0 X10E3/UL (ref 0–0.2)
BASOPHILS NFR BLD AUTO: 1 %
BILIRUB SERPL-MCNC: 0.5 MG/DL (ref 0–1.2)
BUN SERPL-MCNC: 10 MG/DL (ref 6–20)
BUN/CREAT SERPL: 12 (ref 9–23)
CALCIUM SERPL-MCNC: 9.5 MG/DL (ref 8.7–10.2)
CHLORIDE SERPL-SCNC: 101 MMOL/L (ref 96–106)
CHOLEST SERPL-MCNC: 169 MG/DL (ref 100–199)
CO2 SERPL-SCNC: 24 MMOL/L (ref 20–29)
CREAT SERPL-MCNC: 0.81 MG/DL (ref 0.57–1)
EGFRCR SERPLBLD CKD-EPI 2021: 95 ML/MIN/1.73
EOSINOPHIL # BLD AUTO: 0.1 X10E3/UL (ref 0–0.4)
EOSINOPHIL NFR BLD AUTO: 1 %
ERYTHROCYTE [DISTWIDTH] IN BLOOD BY AUTOMATED COUNT: 15.6 % (ref 11.7–15.4)
GLOBULIN SER CALC-MCNC: 2.7 G/DL (ref 1.5–4.5)
GLUCOSE SERPL-MCNC: 75 MG/DL (ref 70–99)
HCT VFR BLD AUTO: 36.2 % (ref 34–46.6)
HDLC SERPL-MCNC: 57 MG/DL
HGB BLD-MCNC: 11.2 G/DL (ref 11.1–15.9)
IMM GRANULOCYTES # BLD AUTO: 0 X10E3/UL (ref 0–0.1)
IMM GRANULOCYTES NFR BLD AUTO: 0 %
LDLC SERPL CALC-MCNC: 99 MG/DL (ref 0–99)
LYMPHOCYTES # BLD AUTO: 1.8 X10E3/UL (ref 0.7–3.1)
LYMPHOCYTES NFR BLD AUTO: 34 %
MCH RBC QN AUTO: 26.9 PG (ref 26.6–33)
MCHC RBC AUTO-ENTMCNC: 30.9 G/DL (ref 31.5–35.7)
MCV RBC AUTO: 87 FL (ref 79–97)
MONOCYTES # BLD AUTO: 0.4 X10E3/UL (ref 0.1–0.9)
MONOCYTES NFR BLD AUTO: 7 %
NEUTROPHILS # BLD AUTO: 3.1 X10E3/UL (ref 1.4–7)
NEUTROPHILS NFR BLD AUTO: 57 %
PLATELET # BLD AUTO: 238 X10E3/UL (ref 150–450)
POTASSIUM SERPL-SCNC: 4.4 MMOL/L (ref 3.5–5.2)
PROT SERPL-MCNC: 7.1 G/DL (ref 6–8.5)
RBC # BLD AUTO: 4.17 X10E6/UL (ref 3.77–5.28)
SODIUM SERPL-SCNC: 136 MMOL/L (ref 134–144)
TRIGL SERPL-MCNC: 65 MG/DL (ref 0–149)
TSH SERPL DL<=0.005 MIU/L-ACNC: 0.9 UIU/ML (ref 0.45–4.5)
VLDLC SERPL CALC-MCNC: 13 MG/DL (ref 5–40)
WBC # BLD AUTO: 5.4 X10E3/UL (ref 3.4–10.8)

## 2024-11-13 ENCOUNTER — OFFICE VISIT (OUTPATIENT)
Facility: CLINIC | Age: 39
End: 2024-11-13
Payer: COMMERCIAL

## 2024-11-13 VITALS
DIASTOLIC BLOOD PRESSURE: 77 MMHG | TEMPERATURE: 97.9 F | BODY MASS INDEX: 31.34 KG/M2 | OXYGEN SATURATION: 100 % | WEIGHT: 195 LBS | HEIGHT: 66 IN | RESPIRATION RATE: 20 BRPM | SYSTOLIC BLOOD PRESSURE: 115 MMHG | HEART RATE: 92 BPM

## 2024-11-13 DIAGNOSIS — M54.41 CHRONIC MIDLINE LOW BACK PAIN WITH BILATERAL SCIATICA: Primary | ICD-10-CM

## 2024-11-13 DIAGNOSIS — G89.29 CHRONIC MIDLINE LOW BACK PAIN WITH BILATERAL SCIATICA: Primary | ICD-10-CM

## 2024-11-13 DIAGNOSIS — E66.9 OBESITY (BMI 30-39.9): ICD-10-CM

## 2024-11-13 DIAGNOSIS — M54.42 CHRONIC MIDLINE LOW BACK PAIN WITH BILATERAL SCIATICA: Primary | ICD-10-CM

## 2024-11-13 PROCEDURE — 99213 OFFICE O/P EST LOW 20 MIN: CPT | Performed by: FAMILY MEDICINE

## 2024-11-13 RX ORDER — SENNOSIDES 8.6 MG
650 CAPSULE ORAL EVERY 8 HOURS PRN
Qty: 90 TABLET | Refills: 1 | Status: SHIPPED | OUTPATIENT
Start: 2024-11-13

## 2024-11-13 RX ORDER — METHYLPREDNISOLONE 4 MG/1
TABLET ORAL
Qty: 1 KIT | Refills: 0 | Status: SHIPPED | OUTPATIENT
Start: 2024-11-13 | End: 2024-11-19

## 2024-11-13 RX ORDER — CYCLOBENZAPRINE HCL 10 MG
10 TABLET ORAL 3 TIMES DAILY PRN
Qty: 60 TABLET | Refills: 1 | Status: SHIPPED | OUTPATIENT
Start: 2024-11-13

## 2024-11-13 RX ORDER — LIDOCAINE 50 MG/G
1 PATCH TOPICAL DAILY
Qty: 30 PATCH | Refills: 1 | Status: SHIPPED | OUTPATIENT
Start: 2024-11-13

## 2024-11-13 NOTE — PROGRESS NOTES
\"Have you been to the ER, urgent care clinic since your last visit?  Hospitalized since your last visit?\"    NO    “Have you seen or consulted any other health care providers outside our system since your last visit?”    NO     “Have you had a pap smear?”    YES - Where: [facility] Nurse/CMA to request most recent records if not in the chart    Date of last Cervical Cancer screen (HPV or PAP): 4/20/2018

## 2024-11-13 NOTE — PROGRESS NOTES
[facility]  Carie Ko comes in for follow up care.  Low back pain with bilateral sciatica: Patient has low back pain.  This has been ongoing for some days now.  She denies trauma to the back.  Denies heavy lifting or strain to the back.  She has a previous history of x-ray lumbar spine that was negative for acute abnormality.  States that she is not able to sit or stand for long duration of time.  Pain radiates to the lower extremities bilaterally.  She has occasional numbness and tingling.  This is low back pain and sciatica.  We discussed supportive care measures.  We discussed management options.  I will refer her to see a spine specialist/orthopedist.  I will have her take Flexeril as a muscle relaxant.  Will send in a Medrol Dosepak and Tylenol arthritis.  She would like some Lidoderm patches.  Prescription is sent in.    Obesity: Patient has a BMI of 31.47.  She is followed up in the weight loss clinic.  She is on semaglutide.  She will continue to follow-up with her specialist.  She will intensify lifestyle and dietary modification.      Past Medical History  Past Medical History:   Diagnosis Date    Gastritis     Obesity        Surgical History  Past Surgical History:   Procedure Laterality Date    BREAST REDUCTION SURGERY      TUBAL LIGATION          Medications  Current Outpatient Medications   Medication Sig Dispense Refill    fluticasone (FLONASE) 50 MCG/ACT nasal spray 2 sprays by Each Nostril route daily 16 g 0    loratadine (CLARITIN) 10 MG tablet Take 1 tablet by mouth daily 90 tablet 0    Semaglutide-Weight Management (WEGOVY) 2.4 MG/0.75ML SOAJ SC injection Inject 2.4 mg into the skin every 7 days 3 mL 1    Semaglutide-Weight Management (WEGOVY) 1 MG/0.5ML SOAJ SC injection Inject 1 mg into the skin every 7 days (Patient not taking: Reported on 11/13/2024)      Semaglutide-Weight Management (WEGOVY) 1.7 MG/0.75ML SOAJ SC injection Inject 1.7 mg into the skin every 7 days (Patient not taking:

## 2025-02-05 ENCOUNTER — OFFICE VISIT (OUTPATIENT)
Facility: CLINIC | Age: 40
End: 2025-02-05

## 2025-02-05 VITALS
DIASTOLIC BLOOD PRESSURE: 71 MMHG | OXYGEN SATURATION: 100 % | RESPIRATION RATE: 20 BRPM | HEART RATE: 76 BPM | WEIGHT: 191 LBS | TEMPERATURE: 97.8 F | BODY MASS INDEX: 30.7 KG/M2 | SYSTOLIC BLOOD PRESSURE: 115 MMHG | HEIGHT: 66 IN

## 2025-02-05 DIAGNOSIS — Z11.1 SCREENING-PULMONARY TB: ICD-10-CM

## 2025-02-05 DIAGNOSIS — F39 MOOD DISORDER (HCC): ICD-10-CM

## 2025-02-05 DIAGNOSIS — Z23 ENCOUNTER FOR IMMUNIZATION: ICD-10-CM

## 2025-02-05 DIAGNOSIS — Z00.00 GENERAL MEDICAL EXAM: Primary | ICD-10-CM

## 2025-02-05 DIAGNOSIS — R73.9 HYPERGLYCEMIA, UNSPECIFIED: ICD-10-CM

## 2025-02-05 DIAGNOSIS — Z01.84 IMMUNITY STATUS TESTING: ICD-10-CM

## 2025-02-05 PROBLEM — M19.91 PRIMARY OSTEOARTHRITIS: Status: ACTIVE | Noted: 2025-02-05

## 2025-02-05 LAB — HBA1C MFR BLD: 5.3 %

## 2025-02-05 RX ORDER — NORETHINDRONE ACETATE AND ETHINYL ESTRADIOL, AND FERROUS FUMARATE 1MG-20(24)
1 KIT ORAL DAILY
COMMUNITY
Start: 2024-12-09

## 2025-02-05 RX ORDER — HYDROXYZINE HYDROCHLORIDE 25 MG/1
25 TABLET, FILM COATED ORAL EVERY 8 HOURS PRN
Qty: 60 TABLET | Refills: 0 | Status: SHIPPED | OUTPATIENT
Start: 2025-02-05

## 2025-02-05 SDOH — ECONOMIC STABILITY: FOOD INSECURITY: WITHIN THE PAST 12 MONTHS, THE FOOD YOU BOUGHT JUST DIDN'T LAST AND YOU DIDN'T HAVE MONEY TO GET MORE.: NEVER TRUE

## 2025-02-05 SDOH — ECONOMIC STABILITY: FOOD INSECURITY: WITHIN THE PAST 12 MONTHS, YOU WORRIED THAT YOUR FOOD WOULD RUN OUT BEFORE YOU GOT MONEY TO BUY MORE.: NEVER TRUE

## 2025-02-05 ASSESSMENT — PATIENT HEALTH QUESTIONNAIRE - PHQ9
SUM OF ALL RESPONSES TO PHQ9 QUESTIONS 1 & 2: 0
SUM OF ALL RESPONSES TO PHQ QUESTIONS 1-9: 0
2. FEELING DOWN, DEPRESSED OR HOPELESS: NOT AT ALL
SUM OF ALL RESPONSES TO PHQ QUESTIONS 1-9: 0
1. LITTLE INTEREST OR PLEASURE IN DOING THINGS: NOT AT ALL
SUM OF ALL RESPONSES TO PHQ QUESTIONS 1-9: 0
SUM OF ALL RESPONSES TO PHQ QUESTIONS 1-9: 0

## 2025-02-05 NOTE — PROGRESS NOTES
HPI  Carie Ko comes in for physical exam. She has paperwork to be filled out for the [facility].  General medical exam: Patient comes in for complete physical exam.  Her physical exam is stable.  She had recent labs checked.  Patient is coming for a preemployment exam.  She needs to have immunizations record filled out.  We do not have her immunization record at the moment.  We will do immunity status testing.  Mood disorder: Patient has mood disorder.  States that she has anxiety.  This is situational.  Occur sporadically and she is unable to control it.  She gets panic attacks and jittery.  She would like to take medication for this.  We discussed options.  Discussed supportive care measures.  She would like to take medication daily to help with this.  I will send in Prozac to take daily.  I will give hydroxyzine to take for breakthrough anxiety.  I will follow-up at next visit.   Hyperglycemia: Patient has a history of hyperglycemia.  HbA1c is 5.3.  She will intensify lifestyle and dietary modification.  Patient will have Mantoux test done today screen for pulmonary TB.      Past Medical History  Past Medical History:   Diagnosis Date    Gastritis     Obesity        Surgical History  Past Surgical History:   Procedure Laterality Date    BREAST REDUCTION SURGERY      TUBAL LIGATION          Medications  Current Outpatient Medications   Medication Sig Dispense Refill    BEN 24 FE 1-20 MG-MCG(24) TABS Take 1 tablet by mouth daily      acetaminophen (TYLENOL) 650 MG extended release tablet Take 1 tablet by mouth every 8 hours as needed for Pain 90 tablet 1    cyclobenzaprine (FLEXERIL) 10 MG tablet Take 1 tablet by mouth 3 times daily as needed for Muscle spasms 60 tablet 1    lidocaine (LIDODERM) 5 % Place 1 patch onto the skin daily 12 hours on, 12 hours off. 30 patch 1    fluticasone (FLONASE) 50 MCG/ACT nasal spray 2 sprays by Each Nostril route daily 16 g 0    loratadine

## 2025-02-05 NOTE — PROGRESS NOTES
\"Have you been to the ER, urgent care clinic since your last visit?  Hospitalized since your last visit?\"    NO    “Have you seen or consulted any other health care providers outside our system since your last visit?”    NO     “Have you had a pap smear?”    YES - Where: [facility] Nurse/NAKUL to request most recent records if not in the chart    Date of last Cervical Cancer screen (HPV or PAP): 4/20/2018

## 2025-02-12 LAB
HBV SURFACE AB SER QL: REACTIVE
MEV IGG SER IA-ACNC: >300 AU/ML
MUV IGG SER IA-ACNC: 66.8 AU/ML
RUBV IGG SERPL IA-ACNC: 3.66 INDEX
VZV IGG SER QL IA: REACTIVE

## 2025-02-17 ENCOUNTER — TELEPHONE (OUTPATIENT)
Facility: CLINIC | Age: 40
End: 2025-02-17

## 2025-02-17 NOTE — TELEPHONE ENCOUNTER
Pt stated she has paperwork being faxed over. She would like the paperwork uploaded to [employer]. When it is completed. Please advise. Thank you!!!

## 2025-02-19 ENCOUNTER — TELEMEDICINE (OUTPATIENT)
Age: 40
End: 2025-02-19
Payer: MEDICAID

## 2025-02-19 DIAGNOSIS — Z71.3 ENCOUNTER FOR WEIGHT LOSS COUNSELING: ICD-10-CM

## 2025-02-19 DIAGNOSIS — Z79.899 FOLLOW-UP ENCOUNTER INVOLVING MEDICATION: ICD-10-CM

## 2025-02-19 DIAGNOSIS — E66.811 CLASS 1 OBESITY DUE TO EXCESS CALORIES WITH SERIOUS COMORBIDITY AND BODY MASS INDEX (BMI) OF 30.0 TO 30.9 IN ADULT: Primary | ICD-10-CM

## 2025-02-19 DIAGNOSIS — E66.09 CLASS 1 OBESITY DUE TO EXCESS CALORIES WITH SERIOUS COMORBIDITY AND BODY MASS INDEX (BMI) OF 30.0 TO 30.9 IN ADULT: Primary | ICD-10-CM

## 2025-02-19 PROCEDURE — 99213 OFFICE O/P EST LOW 20 MIN: CPT | Performed by: NURSE PRACTITIONER

## 2025-02-19 NOTE — PROGRESS NOTES
Carie Ko, was evaluated through a synchronous (real-time) audio-video encounter. The patient (or guardian if applicable) is aware that this is a billable service, which includes applicable co-pays. This Virtual Visit was conducted with patient's (and/or legal guardian's) consent. Patient identification was verified, and a caregiver was present when appropriate.   The patient was located at Home: [facility] [address]  [address]  [place] [address]  Provider was located at Facility (Appt Dept): [address] [place] [address]  [address]  [place] [address]  Confirm you are appropriately licensed, registered, or certified to deliver care in the state where the patient is located as indicated above. If you are not or unsure, please re-schedule the visit: Yes, I confirm.      Total time spent for this encounter: Not billed by time    --ANASTACIA Walsh - NP on 2/22/2025 at 3:20 PM    An electronic signature was used to authenticate this note.    [facility] Progress Note:   F/up Provider Visit    CC: Weight Management    Carie Ko is a 39 y.o. female who is here for her f/up medical provider visit for the Medication program. Last seen 6/19/2024 and she was taking Wegovy at that time.     Reports sporadic use of Wegovy since last visit and was able to get to 2.4 mg dose of Wegovy without side effects. Primary insurance changed coverage for Wegovy on January 1st and it is no longer covered. She does not meet BMI criteria for coverage for GLP-1 medications under her secondary insurance.   Works with  3 nights weekly.   Limiting eating out and sweets/sugary drinks.         2/26/2024    11:14 AM 6/20/2024     3:02 PM 2/22/2025     3:12 PM   Non-Surgical Weight Loss Tracker   Consult Date 11/29/2023     Initial Height 5' 7\"     Initial Weight 210 lb     Ideal Body Weight 137 lb     Initial BMI 32.89     Initial Body Fat % 39.47     EBW 73 lb     Date

## 2025-02-22 ASSESSMENT — ENCOUNTER SYMPTOMS
VOMITING: 0
DIARRHEA: 0
CONSTIPATION: 0
NAUSEA: 0
COUGH: 0
ABDOMINAL PAIN: 0
SHORTNESS OF BREATH: 0

## 2025-03-03 ENCOUNTER — TELEPHONE (OUTPATIENT)
Facility: CLINIC | Age: 40
End: 2025-03-03

## 2025-04-11 DIAGNOSIS — F39 MOOD DISORDER: ICD-10-CM

## 2025-04-11 NOTE — TELEPHONE ENCOUNTER
Last seen 2/5/2025   Last labs 9/17/2024  Last filled  2/5/2025 requesting a 90 day refill  Next appointment Visit date not found     Lab Results   Component Value Date     09/17/2024    K 4.4 09/17/2024     09/17/2024    CO2 24 09/17/2024    BUN 10 09/17/2024    CREATININE 0.81 09/17/2024    GLUCOSE 75 09/17/2024    CALCIUM 9.5 09/17/2024    BILITOT 0.5 09/17/2024    ALKPHOS 72 09/17/2024    AST 18 09/17/2024    ALT 18 09/17/2024    LABGLOM 95 09/17/2024

## 2025-06-18 ENCOUNTER — TELEMEDICINE (OUTPATIENT)
Age: 40
End: 2025-06-18
Payer: MEDICAID

## 2025-06-18 DIAGNOSIS — E66.09 CLASS 1 OBESITY DUE TO EXCESS CALORIES WITH SERIOUS COMORBIDITY AND BODY MASS INDEX (BMI) OF 30.0 TO 30.9 IN ADULT: Primary | ICD-10-CM

## 2025-06-18 DIAGNOSIS — Z71.3 ENCOUNTER FOR WEIGHT LOSS COUNSELING: ICD-10-CM

## 2025-06-18 DIAGNOSIS — E66.811 CLASS 1 OBESITY DUE TO EXCESS CALORIES WITH SERIOUS COMORBIDITY AND BODY MASS INDEX (BMI) OF 30.0 TO 30.9 IN ADULT: Primary | ICD-10-CM

## 2025-06-18 PROCEDURE — 99213 OFFICE O/P EST LOW 20 MIN: CPT | Performed by: NURSE PRACTITIONER

## 2025-06-18 RX ORDER — ACETAMINOPHEN AND CODEINE PHOSPHATE 300; 30 MG/1; MG/1
TABLET ORAL
COMMUNITY
Start: 2025-05-01 | End: 2025-06-18

## 2025-06-18 RX ORDER — ONDANSETRON 4 MG/1
TABLET, ORALLY DISINTEGRATING ORAL
COMMUNITY
Start: 2025-06-17 | End: 2025-06-18

## 2025-06-18 RX ORDER — TOPIRAMATE 25 MG/1
25 TABLET, FILM COATED ORAL DAILY
Qty: 60 TABLET | Refills: 1 | Status: SHIPPED | OUTPATIENT
Start: 2025-06-18

## 2025-06-18 RX ORDER — CEPHALEXIN 500 MG/1
CAPSULE ORAL
COMMUNITY
Start: 2025-06-17 | End: 2025-06-18

## 2025-06-18 NOTE — PROGRESS NOTES
Chief Complaint   Patient presents with    Weight Management    Medication Management     [facility] f/u      1. Have you been to the ER, urgent care clinic since your last visit?  Hospitalized since your last visit?No    2. Have you seen or consulted any other health care providers outside of the [facility] since your last visit?  Include any pap smears or colon screening. No    [facility] Nurse Note:       CC: Weight Management    Carie Ko is a 40 y.o. female who is here for her f/up medical provider visit for the Medication program.       Did you have any problems adhering to the program since last visit? No  If yes, please explain:     Since your last visit, have you experienced any complications? No    Have you received any other medical care since last visit? No  If yes, where and for what?     Have you had any change in your medications since your last visit? No If yes what?     Are you taking an anti-obesity medication? No If yes what and are you experiencing any side effects?      Would you still like to continue your current medication and dosage? N/a    BP Readings from Last 3 Encounters:   02/05/25 115/71   11/13/24 115/77   06/19/24 118/80        Eating Habits Over Last Week:    How many oz of sugar-free fluids are you consuming? 32    Did you consume your prescribed meal replacement regimen each day? No    Physical Activity Routine:    Aerobic exercise: none    Resistance exercise: none

## 2025-06-19 ASSESSMENT — ENCOUNTER SYMPTOMS
COUGH: 0
CONSTIPATION: 0
VOMITING: 0
DIARRHEA: 0
ABDOMINAL PAIN: 0
NAUSEA: 0
SHORTNESS OF BREATH: 0

## 2025-06-19 NOTE — PROGRESS NOTES
Carie Ko, was evaluated through a synchronous (real-time) audio-video encounter. The patient (or guardian if applicable) is aware that this is a billable service, which includes applicable co-pays. This Virtual Visit was conducted with patient's (and/or legal guardian's) consent. Patient identification was verified, and a caregiver was present when appropriate.   The patient was located at Home: [facility] [address]  [address]  [place] [address]  Provider was located at Facility (Appt Dept): [address] [place] [address] [address]  [address]  [place] [address]  Confirm you are appropriately licensed, registered, or certified to deliver care in the state where the patient is located as indicated above. If you are not or unsure, please re-schedule the visit: Yes, I confirm.      Total time spent for this encounter: Not billed by time    --ANASTACIA Walsh NP on 6/19/2025 at 12:59 PM    An electronic signature was used to authenticate this note.    [facility] Progress Note:   F/up Provider Visit    CC: Weight Management    History of Present Illness  The patient is a 40-year-old female presenting via virtual visit for weight management. She is currently participating in a low-calorie diet program.    She has been without any recent medication regimen due to her primary insurance's change in coverage for Wegovy, which was not covered by her secondary insurance either because of her BMI. She is now solely reliant on Medicaid. Her current weight is 196 pounds, and she stands at 5 feet 6 inches tall.     She maintains a healthy diet, ensuring adequate water intake and avoiding high-carbohydrate foods such as pasta, rice, and potatoes. She does not consume sugary drinks like sweet tea, juice, or soda, opting instead for zero-sugar soda. Her appetite fluctuates, and she does not indulge in snacks. She consumes alcohol only on weekends.    Last Non-Surgical Weight Loss:      6/18/2025

## 2025-07-28 ENCOUNTER — TELEMEDICINE (OUTPATIENT)
Age: 40
End: 2025-07-28
Payer: MEDICAID

## 2025-07-28 DIAGNOSIS — Z79.899 FOLLOW-UP ENCOUNTER INVOLVING MEDICATION: ICD-10-CM

## 2025-07-28 DIAGNOSIS — E66.09 CLASS 1 OBESITY DUE TO EXCESS CALORIES WITH SERIOUS COMORBIDITY AND BODY MASS INDEX (BMI) OF 30.0 TO 30.9 IN ADULT: Primary | ICD-10-CM

## 2025-07-28 DIAGNOSIS — E66.811 CLASS 1 OBESITY DUE TO EXCESS CALORIES WITH SERIOUS COMORBIDITY AND BODY MASS INDEX (BMI) OF 30.0 TO 30.9 IN ADULT: Primary | ICD-10-CM

## 2025-07-28 DIAGNOSIS — Z71.3 ENCOUNTER FOR WEIGHT LOSS COUNSELING: ICD-10-CM

## 2025-07-28 PROCEDURE — 99213 OFFICE O/P EST LOW 20 MIN: CPT | Performed by: NURSE PRACTITIONER

## 2025-07-28 NOTE — PROGRESS NOTES
Chief Complaint   Patient presents with    Weight Management     6 wk f/u     1. Have you been to the ER, urgent care clinic since your last visit?  Hospitalized since your last visit?Yes      2. Have you seen or consulted any other health care providers outside of the [facility] since your last visit?  Include any pap smears or colon screening. No      [facility] Nurse Note:       CC: Weight Management    Carie Ko is a 40 y.o. female who is here for her f/up medical provider visit for the LCD program.       Did you have any problems adhering to the program since last visit? No  If yes, please explain:     Since your last visit, have you experienced any complications? No    Have you received any other medical care since last visit? No  If yes, where and for what?     Have you had any change in your medications since your last visit? No If yes what?     Are you taking an anti-obesity medication? No If yes what and are you experiencing any side effects?      Would you still like to continue your current medication and dosage?      BP Readings from Last 3 Encounters:   02/05/25 115/71   11/13/24 115/77   06/19/24 118/80        Eating Habits Over Last Week:    How many oz of sugar-free fluids are you consuming? 64    Did you consume your prescribed meal replacement regimen each day? No    Physical Activity Routine:    Aerobic exercise: none    Resistance exercise: none

## 2025-07-30 NOTE — PROGRESS NOTES
Carie Ko, was evaluated through a synchronous (real-time) audio-video encounter. The patient (or guardian if applicable) is aware that this is a billable service, which includes applicable co-pays. This Virtual Visit was conducted with patient's (and/or legal guardian's) consent. Patient identification was verified, and a caregiver was present when appropriate.   The patient was located at Home: [facility] [address]  [address]  [place] [address]  Provider was located at Facility (Appt Dept): [address] [place] [address]  [address]  [place] [address]  Confirm you are appropriately licensed, registered, or certified to deliver care in the state where the patient is located as indicated above. If you are not or unsure, please re-schedule the visit: Yes, I confirm.      Total time spent for this encounter: Not billed by time    --ANASTACIA Walsh NP on 7/31/2025 at 12:47 PM    An electronic signature was used to authenticate this note.    [facility] Progress Note:   F/up Provider Visit    CC: Weight Management    History of Present Illness  The patient is a 40-year-old female presenting via virtual visit for weight management.    She is currently on topiramate 25 mg. She reports no side effects from the topiramate but is uncertain about its effectiveness. Her daily routine includes taking one tablet in the morning, and she has not yet tried increasing the dosage to two tablets. She maintains adequate hydration but does not engage in regular exercise due to time constraints. Her diet is inconsistent, often skipping breakfast and sometimes lunch or dinner. However, she notes a change in her appetite, which she attributes to the medication.    SOCIAL HISTORY  Exercise: House cleaning and other home activities; no formal exercise routine.  Diet: Consumes lunch and dinner; breakfast varies; drinks a lot of water.    Last Non-Surgical Weight Loss:      7/28/2025    12:55 PM

## 2025-07-31 ASSESSMENT — ENCOUNTER SYMPTOMS
CONSTIPATION: 0
NAUSEA: 0
VOMITING: 0
DIARRHEA: 0
ABDOMINAL PAIN: 0
SHORTNESS OF BREATH: 0
COUGH: 0